# Patient Record
Sex: FEMALE | Race: WHITE | Employment: FULL TIME | URBAN - METROPOLITAN AREA
[De-identification: names, ages, dates, MRNs, and addresses within clinical notes are randomized per-mention and may not be internally consistent; named-entity substitution may affect disease eponyms.]

---

## 2021-08-03 ENCOUNTER — APPOINTMENT (OUTPATIENT)
Dept: GENERAL RADIOLOGY | Age: 66
End: 2021-08-03
Payer: COMMERCIAL

## 2021-08-03 ENCOUNTER — HOSPITAL ENCOUNTER (OUTPATIENT)
Age: 66
Setting detail: OBSERVATION
Discharge: HOME | End: 2021-08-04
Attending: INTERNAL MEDICINE | Admitting: INTERNAL MEDICINE
Payer: COMMERCIAL

## 2021-08-03 DIAGNOSIS — R07.89 ATYPICAL CHEST PAIN: Primary | ICD-10-CM

## 2021-08-03 DIAGNOSIS — R06.00 DYSPNEA, UNSPECIFIED TYPE: ICD-10-CM

## 2021-08-03 DIAGNOSIS — R94.31 ABNORMAL ECG: ICD-10-CM

## 2021-08-03 DIAGNOSIS — R06.02 SHORTNESS OF BREATH: ICD-10-CM

## 2021-08-03 LAB
ALBUMIN SERPL-MCNC: 4.6 G/DL (ref 3.5–4.6)
ALP BLD-CCNC: 88 U/L (ref 40–130)
ALT SERPL-CCNC: 9 U/L (ref 0–33)
ANION GAP SERPL CALCULATED.3IONS-SCNC: 11 MEQ/L (ref 9–15)
APTT: 25.3 SEC (ref 24.4–36.8)
AST SERPL-CCNC: 18 U/L (ref 0–35)
BASOPHILS ABSOLUTE: 0 K/UL (ref 0–0.2)
BASOPHILS RELATIVE PERCENT: 0.5 %
BILIRUB SERPL-MCNC: 0.4 MG/DL (ref 0.2–0.7)
BUN BLDV-MCNC: 12 MG/DL (ref 8–23)
CALCIUM SERPL-MCNC: 9.9 MG/DL (ref 8.5–9.9)
CHLORIDE BLD-SCNC: 98 MEQ/L (ref 95–107)
CO2: 28 MEQ/L (ref 20–31)
CREAT SERPL-MCNC: 0.96 MG/DL (ref 0.5–0.9)
D DIMER: 0.28 MG/L FEU (ref 0–0.5)
EOSINOPHILS ABSOLUTE: 0.1 K/UL (ref 0–0.7)
EOSINOPHILS RELATIVE PERCENT: 1.6 %
GFR AFRICAN AMERICAN: >60
GFR NON-AFRICAN AMERICAN: 58.2
GLOBULIN: 2.8 G/DL (ref 2.3–3.5)
GLUCOSE BLD-MCNC: 96 MG/DL (ref 70–99)
HCT VFR BLD CALC: 44.8 % (ref 37–47)
HEMOGLOBIN: 14.9 G/DL (ref 12–16)
INR BLD: 0.9
LYMPHOCYTES ABSOLUTE: 1.4 K/UL (ref 1–4.8)
LYMPHOCYTES RELATIVE PERCENT: 19.6 %
MCH RBC QN AUTO: 31.9 PG (ref 27–31.3)
MCHC RBC AUTO-ENTMCNC: 33.3 % (ref 33–37)
MCV RBC AUTO: 95.7 FL (ref 82–100)
MONOCYTES ABSOLUTE: 0.6 K/UL (ref 0.2–0.8)
MONOCYTES RELATIVE PERCENT: 8.3 %
NEUTROPHILS ABSOLUTE: 5.1 K/UL (ref 1.4–6.5)
NEUTROPHILS RELATIVE PERCENT: 70 %
PDW BLD-RTO: 14.5 % (ref 11.5–14.5)
PLATELET # BLD: 304 K/UL (ref 130–400)
POTASSIUM SERPL-SCNC: 4.8 MEQ/L (ref 3.4–4.9)
PRO-BNP: 1039 PG/ML
PROTHROMBIN TIME: 12.5 SEC (ref 12.3–14.9)
RBC # BLD: 4.68 M/UL (ref 4.2–5.4)
SARS-COV-2, NAAT: NOT DETECTED
SODIUM BLD-SCNC: 137 MEQ/L (ref 135–144)
TOTAL CK: 58 U/L (ref 0–170)
TOTAL PROTEIN: 7.4 G/DL (ref 6.3–8)
TROPONIN: <0.01 NG/ML (ref 0–0.01)
WBC # BLD: 7.2 K/UL (ref 4.8–10.8)

## 2021-08-03 PROCEDURE — 2580000003 HC RX 258: Performed by: PHYSICIAN ASSISTANT

## 2021-08-03 PROCEDURE — 84484 ASSAY OF TROPONIN QUANT: CPT

## 2021-08-03 PROCEDURE — 85025 COMPLETE CBC W/AUTO DIFF WBC: CPT

## 2021-08-03 PROCEDURE — 93005 ELECTROCARDIOGRAM TRACING: CPT | Performed by: PHYSICIAN ASSISTANT

## 2021-08-03 PROCEDURE — 36415 COLL VENOUS BLD VENIPUNCTURE: CPT

## 2021-08-03 PROCEDURE — 96372 THER/PROPH/DIAG INJ SC/IM: CPT

## 2021-08-03 PROCEDURE — 6360000002 HC RX W HCPCS: Performed by: PHYSICIAN ASSISTANT

## 2021-08-03 PROCEDURE — 82550 ASSAY OF CK (CPK): CPT

## 2021-08-03 PROCEDURE — 85379 FIBRIN DEGRADATION QUANT: CPT

## 2021-08-03 PROCEDURE — 83880 ASSAY OF NATRIURETIC PEPTIDE: CPT

## 2021-08-03 PROCEDURE — G0378 HOSPITAL OBSERVATION PER HR: HCPCS

## 2021-08-03 PROCEDURE — 71045 X-RAY EXAM CHEST 1 VIEW: CPT

## 2021-08-03 PROCEDURE — 6370000000 HC RX 637 (ALT 250 FOR IP): Performed by: PHYSICIAN ASSISTANT

## 2021-08-03 PROCEDURE — 80053 COMPREHEN METABOLIC PANEL: CPT

## 2021-08-03 PROCEDURE — 99284 EMERGENCY DEPT VISIT MOD MDM: CPT

## 2021-08-03 PROCEDURE — 85730 THROMBOPLASTIN TIME PARTIAL: CPT

## 2021-08-03 PROCEDURE — 87635 SARS-COV-2 COVID-19 AMP PRB: CPT

## 2021-08-03 PROCEDURE — 85610 PROTHROMBIN TIME: CPT

## 2021-08-03 PROCEDURE — 96374 THER/PROPH/DIAG INJ IV PUSH: CPT

## 2021-08-03 RX ORDER — ONDANSETRON 2 MG/ML
4 INJECTION INTRAMUSCULAR; INTRAVENOUS EVERY 6 HOURS PRN
Status: DISCONTINUED | OUTPATIENT
Start: 2021-08-03 | End: 2021-08-04 | Stop reason: HOSPADM

## 2021-08-03 RX ORDER — ACETAMINOPHEN 325 MG/1
650 TABLET ORAL EVERY 6 HOURS PRN
Status: DISCONTINUED | OUTPATIENT
Start: 2021-08-03 | End: 2021-08-04 | Stop reason: HOSPADM

## 2021-08-03 RX ORDER — OMEPRAZOLE 20 MG/1
20 CAPSULE, DELAYED RELEASE ORAL DAILY
COMMUNITY

## 2021-08-03 RX ORDER — AMLODIPINE BESYLATE 5 MG/1
5 TABLET ORAL DAILY
COMMUNITY
End: 2021-09-22 | Stop reason: SDUPTHER

## 2021-08-03 RX ORDER — NITROGLYCERIN 0.4 MG/1
0.4 TABLET SUBLINGUAL EVERY 5 MIN PRN
Status: DISCONTINUED | OUTPATIENT
Start: 2021-08-03 | End: 2021-08-04 | Stop reason: HOSPADM

## 2021-08-03 RX ORDER — POLYETHYLENE GLYCOL 3350 17 G/17G
17 POWDER, FOR SOLUTION ORAL DAILY PRN
Status: DISCONTINUED | OUTPATIENT
Start: 2021-08-03 | End: 2021-08-04 | Stop reason: HOSPADM

## 2021-08-03 RX ORDER — SODIUM CHLORIDE 0.9 % (FLUSH) 0.9 %
5-40 SYRINGE (ML) INJECTION EVERY 12 HOURS SCHEDULED
Status: DISCONTINUED | OUTPATIENT
Start: 2021-08-03 | End: 2021-08-04 | Stop reason: HOSPADM

## 2021-08-03 RX ORDER — SODIUM CHLORIDE 9 MG/ML
25 INJECTION, SOLUTION INTRAVENOUS PRN
Status: DISCONTINUED | OUTPATIENT
Start: 2021-08-03 | End: 2021-08-04 | Stop reason: HOSPADM

## 2021-08-03 RX ORDER — ACETAMINOPHEN 650 MG/1
650 SUPPOSITORY RECTAL EVERY 6 HOURS PRN
Status: DISCONTINUED | OUTPATIENT
Start: 2021-08-03 | End: 2021-08-04 | Stop reason: HOSPADM

## 2021-08-03 RX ORDER — METHYLPREDNISOLONE SODIUM SUCCINATE 125 MG/2ML
125 INJECTION, POWDER, LYOPHILIZED, FOR SOLUTION INTRAMUSCULAR; INTRAVENOUS ONCE
Status: COMPLETED | OUTPATIENT
Start: 2021-08-03 | End: 2021-08-03

## 2021-08-03 RX ORDER — ONDANSETRON 4 MG/1
4 TABLET, ORALLY DISINTEGRATING ORAL EVERY 8 HOURS PRN
Status: DISCONTINUED | OUTPATIENT
Start: 2021-08-03 | End: 2021-08-04 | Stop reason: HOSPADM

## 2021-08-03 RX ORDER — ATORVASTATIN CALCIUM 80 MG/1
80 TABLET, FILM COATED ORAL NIGHTLY
Status: DISCONTINUED | OUTPATIENT
Start: 2021-08-03 | End: 2021-08-04 | Stop reason: HOSPADM

## 2021-08-03 RX ORDER — ATENOLOL 50 MG/1
TABLET ORAL DAILY
COMMUNITY
End: 2021-09-22 | Stop reason: SDUPTHER

## 2021-08-03 RX ORDER — SODIUM CHLORIDE 0.9 % (FLUSH) 0.9 %
5-40 SYRINGE (ML) INJECTION PRN
Status: DISCONTINUED | OUTPATIENT
Start: 2021-08-03 | End: 2021-08-04 | Stop reason: HOSPADM

## 2021-08-03 RX ORDER — ASPIRIN 81 MG/1
81 TABLET, CHEWABLE ORAL DAILY
Status: DISCONTINUED | OUTPATIENT
Start: 2021-08-04 | End: 2021-08-04 | Stop reason: HOSPADM

## 2021-08-03 RX ADMIN — ENOXAPARIN SODIUM 40 MG: 40 INJECTION SUBCUTANEOUS at 21:23

## 2021-08-03 RX ADMIN — ATORVASTATIN CALCIUM 80 MG: 80 TABLET, FILM COATED ORAL at 21:22

## 2021-08-03 RX ADMIN — SODIUM CHLORIDE, PRESERVATIVE FREE 10 ML: 5 INJECTION INTRAVENOUS at 21:23

## 2021-08-03 RX ADMIN — METHYLPREDNISOLONE SODIUM SUCCINATE 125 MG: 125 INJECTION, POWDER, FOR SOLUTION INTRAMUSCULAR; INTRAVENOUS at 14:56

## 2021-08-03 ASSESSMENT — ENCOUNTER SYMPTOMS
COLOR CHANGE: 0
SHORTNESS OF BREATH: 1
EYE DISCHARGE: 0
NAUSEA: 0
ABDOMINAL PAIN: 0
ABDOMINAL DISTENTION: 0
CONSTIPATION: 0
COUGH: 1
SORE THROAT: 0
RHINORRHEA: 0
DIARRHEA: 0
WHEEZING: 0

## 2021-08-03 ASSESSMENT — PAIN SCALES - GENERAL
PAINLEVEL_OUTOF10: 0

## 2021-08-03 NOTE — ED TRIAGE NOTES
Pt c/o sob and productive cough with yellow production since Saturday, pt a&ox4,s kin w//dpink, pulses palp, 0 distress at this time, steady gait noted, speech clear.

## 2021-08-03 NOTE — ED PROVIDER NOTES
[address] [facility] ED  eMERGENCY dEPARTMENT eNCOUnter      Pt Name: Arianna Villa  MRN: [id number]  [place] 1955  Date of evaluation: 8/3/2021  Provider: Kiel Tan PA-C    CHIEF COMPLAINT       Chief Complaint   Patient presents with    Shortness of Breath     pt c/o sob an dproductive cough since saturday         HISTORY OF PRESENT ILLNESS   (Location/Symptom, Timing/Onset,Context/Setting, Quality, Duration, Modifying Factors, Severity)  Note limiting factors. Arianna Villa is a 72 y.o. female who presents to the emergency department with a complaint of cough, which is productive for yellow sputum, shortness of breath, which started this past Saturday, 7/31/2021. Patient denies any chest pain, she states she does have past history of chronic bronchitis, hypertension. She states she does get frequent infections. Patient this time has no pain, 0 out of 10. She states shortness of breath all the time she does not wear home O2. HPI    NursingNotes were reviewed. REVIEW OF SYSTEMS    (2-9 systems for level 4, 10 or more for level 5)     Review of Systems   Constitutional: Negative for activity change and appetite change. HENT: Negative for congestion, ear discharge, ear pain, nosebleeds, rhinorrhea and sore throat. Eyes: Negative for discharge. Respiratory: Positive for cough and shortness of breath. Negative for wheezing. Cardiovascular: Negative for chest pain, palpitations and leg swelling. Gastrointestinal: Negative for abdominal distention, abdominal pain, constipation, diarrhea and nausea. Genitourinary: Negative for difficulty urinating and dysuria. Musculoskeletal: Negative for arthralgias. Skin: Negative for color change. Neurological: Negative for dizziness, syncope, weakness, numbness and headaches. Psychiatric/Behavioral: Negative for agitation and confusion. Except as noted above the remainder of the review of systems was reviewed and negative. PAST MEDICAL HISTORY     Past Medical History:   Diagnosis Date    Bronchitis     Hypertension          SURGICALHISTORY       Past Surgical History:   Procedure Laterality Date    LUNG SURGERY      lump found 1980's    TUBAL LIGATION           CURRENT MEDICATIONS       Previous Medications    AMLODIPINE (NORVASC) 2.5 MG TABLET    Take 2.5 mg by mouth daily    ATENOLOL PO    Take by mouth       ALLERGIES     Codeine    FAMILY HISTORY     History reviewed. No pertinent family history. SOCIAL HISTORY       Social History     Socioeconomic History    Marital status:      Spouse name: None    Number of children: None    Years of education: None    Highest education level: None   Occupational History    None   Tobacco Use    Smoking status: Former Smoker    Smokeless tobacco: Never Used   Substance and Sexual Activity    Alcohol use: Yes     Comment: occas.  Drug use: Never    Sexual activity: None   Other Topics Concern    None   Social History Narrative    None     Social Determinants of Health     Financial Resource Strain:     Difficulty of Paying Living Expenses:    Food Insecurity:     Worried About Running Out of Food in the Last Year:     [address] in the Last Year:    Transportation Needs:     Lack of Transportation (Medical):      Lack of Transportation (Non-Medical):    Physical Activity:     Days of Exercise per Week:     Minutes of Exercise per Session:    Stress:     Feeling of Stress :    Social Connections:     Frequency of Communication with Friends and Family:     Frequency of Social Gatherings with Friends and Family:     Attends [employer] Services:     Active Member of Clubs or Organizations:     Attends Club or Organization Meetings:     Marital Status:    Intimate Partner Violence:     Fear of Current or Ex-Partner:     Emotionally Abused:     Physically Abused:     Sexually Abused:        SCREENINGS      @FLOW([id number])@      PHYSICAL EXAM (up to 7 for level 4, 8 or more for level 5)     ED Triage Vitals [08/03/21 1416]   BP Temp Temp Source Pulse Resp SpO2 Height Weight   (!) 150/78 97.5 °F (36.4 °C) Oral 69 18 95 % 4' 11\" (1.499 m) 95 lb 6.4 oz (43.3 kg)       Physical Exam  Vitals and nursing note reviewed. Constitutional:       General: She is not in acute distress. Appearance: She is well-developed. She is not ill-appearing, toxic-appearing or diaphoretic. HENT:      Head: Normocephalic. Nose: No congestion. Mouth/Throat:      Mouth: Mucous membranes are moist.      Pharynx: No oropharyngeal exudate or posterior oropharyngeal erythema. Eyes:      Extraocular Movements: Extraocular movements intact. Conjunctiva/sclera: Conjunctivae normal.      Pupils: Pupils are equal, round, and reactive to light. Neck:      Vascular: No JVD. Trachea: No tracheal deviation. Cardiovascular:      Rate and Rhythm: Normal rate. Pulses: Normal pulses. Heart sounds: Normal heart sounds. No murmur heard. No friction rub. No gallop. Pulmonary:      Effort: Pulmonary effort is normal. No tachypnea, accessory muscle usage, respiratory distress or retractions. Breath sounds: Normal breath sounds. No stridor. No wheezing, rhonchi or rales. Comments: Lung sounds are clear in all fields, there is no wheezes rales or rhonchi, no accessory muscle use, retractions. Saturations are 95% on room air. Chest:      Chest wall: No tenderness. Abdominal:      General: Abdomen is flat. Bowel sounds are normal. There is no distension or abdominal bruit. Palpations: There is no shifting dullness, fluid wave, hepatomegaly, splenomegaly, mass or pulsatile mass. Tenderness: There is no abdominal tenderness. There is no right CVA tenderness, left CVA tenderness, guarding or rebound. Negative signs include Hardy's sign, Rovsing's sign and McBurney's sign. Musculoskeletal:         General: No deformity.       Cervical back: Normal range of motion and neck supple. No rigidity. Right lower leg: No edema. Left lower leg: No edema. Skin:     General: Skin is warm and dry. Capillary Refill: Capillary refill takes less than 2 seconds. Coloration: Skin is not jaundiced. Neurological:      General: No focal deficit present. Mental Status: She is alert and oriented to person, place, and time. Mental status is at baseline. Cranial Nerves: No cranial nerve deficit. Sensory: No sensory deficit. Motor: No weakness. Coordination: Coordination normal.   Psychiatric:         Mood and Affect: Mood normal.         DIAGNOSTIC RESULTS     EKG: All EKG's are interpreted by the Emergency Department Physician who either signs or Co-signsthis chart in the absence of a cardiologist.    EKG shows normal sinus rhythm at 65 bpm there is T wave inversions in leads V1, V2, V3, V4 V5 and V6 no ventricular ectopy QTC is 438 ms    RADIOLOGY:   Non-plain filmimages such as CT, Ultrasound and MRI are read by the radiologist. Plain radiographic images are visualized and preliminarily interpreted by the emergency physician with the below findings:        Interpretation per the Radiologist below, if available at the time ofthis note:    XR CHEST PORTABLE   Final Result   No radiographic evidence of acute intrathoracic process.                   ED BEDSIDE ULTRASOUND:   Performed by ED Physician - none    LABS:  Labs Reviewed   CBC WITH AUTO DIFFERENTIAL - Abnormal; Notable for the following components:       Result Value    MCH 31.9 (*)     All other components within normal limits   COMPREHENSIVE METABOLIC PANEL - Abnormal; Notable for the following components:    CREATININE 0.96 (*)     GFR Non- 58.2 (*)     All other components within normal limits   COVID-19, RAPID   TROPONIN   CK   PROTIME-INR   APTT   D-DIMER, QUANTITATIVE       All other labs were within normal range or not returned as of this

## 2021-08-03 NOTE — CONSULTS
Advance Care Planning     Advance Care Planning Inpatient Note  [facility] Department    Today's Date: 8/3/2021  Unit: [facility]    Received request from a healthcare provider. Upon review of chart and communication with care team, patient's decision making abilities are not in question. Patientwas/were present in the room during visit. Goals of ACP Conversation:  Discuss advance care planning documents  Facilitate a discussion related to patient's goals of care as they align with the patient's values and beliefs. Health Care Decision Makers:      Primary Decision Maker: Jessica Estephaniehenry - Brother/Sister - [phone]    Secondary Decision Maker: Roberto Smith Child - [phone]  Click here to complete [address] including selection of the Healthcare Decision Maker Relationship (ie \"Primary\")     Today we:  Updated [address] (Patient Wishes):  Healthcare Power of /Advance Directive Appointment of Health Care Agent  Living Will/Advance Directive     Assessment:  Supportive conversation with pt and her sister. Eleven siblings in their family, now [de-identified] yet living, they are the only two girls. The pt cared for one brother until his death from cancer. She was able to articulate that she does not wish to die as he did, and thus decided to complete a Living Will, with a plan to receive less aggressive treatment near end of life. Her son's father  one year ago as well she said, keenly aware of loss. Interventions:  Assisted in the completion of documents according to patient's wishes at this time. Returned original document(s) to patient, as well as copies for distribution to appointed agents. Outcomes/Plan:  New advance directive completed.     Electronically signed by Laura Narayan, [address] [employer] on 8/3/2021 at 6:51 PM

## 2021-08-03 NOTE — ACP (ADVANCE CARE PLANNING)
Advance Care Planning     Advance Care Planning Activator (Inpatient)  Conversation Note      Date of ACP Conversation: 8/3/2021     Conversation Conducted with: Patient with Decision Making Capacity    ACP Activator: [address] Decision Maker:     Current Designated Health Care Decision Maker:     Dashawn Mcduffie: sister: [phone]      Care Preferences    Ventilation: \"If you were in your present state of health and suddenly became very ill and were unable to breathe on your own, what would your preference be about the use of a ventilator (breathing machine) if it were available to you? \"      Would the patient desire the use of ventilator (breathing machine)?: yes    \"If your health worsens and it becomes clear that your chance of recovery is unlikely, what would your preference be about the use of a ventilator (breathing machine) if it were available to you? \"     Would the patient desire the use of ventilator (breathing machine)?: Yes      Resuscitation  \"CPR works best to restart the heart when there is a sudden event, like a heart attack, in someone who is otherwise healthy. Unfortunately, CPR does not typically restart the heart for people who have serious health conditions or who are very sick. \"    \"In the event your heart stopped as a result of an underlying serious health condition, would you want attempts to be made to restart your heart (answer \"yes\" for attempt to resuscitate) or would you prefer a natural death (answer \"no\" for do not attempt to resuscitate)? \" yes       [x] Yes   [] No   Educated Patient / Nury Brandt regarding differences between Advance Directives and portable DNR orders.     Length of ACP Conversation in minutes:  10  Conversation Outcomes:  [x] ACP discussion completed  [] Existing advance directive reviewed with patient; no changes to patient's previously recorded wishes  [] New Advance Directive completed  [] Portable Do Not Rescitate prepared for Provider review and signature  [] POLST/POST/MOLST/MOST prepared for Provider review and signature      Follow-up plan:    [] Schedule follow-up conversation to continue planning  [] Referred individual to Provider for additional questions/concerns   [] Advised patient/agent/surrogate to review completed ACP document and update if needed with changes in condition, patient preferences or care setting    [x] This note routed to one or more involved healthcare providers

## 2021-08-03 NOTE — ED NOTES
Patient sitting in ED cot, no distress noted, resp even and unlabored, no concerns voiced. Will continue to monitor.      Rhonda Zamora RN  08/03/21 4457

## 2021-08-03 NOTE — CARE COORDINATION
[facility] Case Management Initial Discharge Assessment    Met with Patient to discuss discharge plan. PCP: Trevor Soulier, DO                                Date of Last Visit: weeks    If no PCP, list provided? N/A    Discharge Planning    Living Arrangements: independently at home    Who do you live with? self    Who helps you with your care:  self    If lives at home:     Do you have any barriers navigating in your home? no    Patient can perform ADL? Yes    Current Services (outpatient and in home) :  None    Dialysis: No    Is transportation available to get to your appointments? Yes    DME Equipment:  no    Respiratory equipment: None    Respiratory provider:  no     [facility]    Consult with Medication Assistance Program?  No      Patient agreeable to [facility]? Declined    Patient agreeable to SNF/Rehab? Declined    Other discharge needs identified? N/A    Does Patient Have a High-Risk for Readmission Diagnosis (CHF, PN, MI, COPD)? No       Initial Discharge Plan? (Note: please see concurrent daily documentation for any updates after initial note). Cm to assess for further d/c needs and referrals.     Readmission Risk              Risk of Unplanned Readmission:  0         Electronically signed by Della Rivers on 8/3/2021 at 4:59 PM

## 2021-08-04 VITALS
TEMPERATURE: 97.4 F | SYSTOLIC BLOOD PRESSURE: 129 MMHG | RESPIRATION RATE: 20 BRPM | BODY MASS INDEX: 19.25 KG/M2 | HEART RATE: 71 BPM | OXYGEN SATURATION: 94 % | HEIGHT: 59 IN | WEIGHT: 95.5 LBS | DIASTOLIC BLOOD PRESSURE: 81 MMHG

## 2021-08-04 LAB
CHOLESTEROL, TOTAL: 302 MG/DL (ref 0–199)
EKG ATRIAL RATE: 65 BPM
EKG ATRIAL RATE: 72 BPM
EKG P AXIS: 77 DEGREES
EKG P AXIS: 79 DEGREES
EKG P-R INTERVAL: 154 MS
EKG P-R INTERVAL: 164 MS
EKG Q-T INTERVAL: 398 MS
EKG Q-T INTERVAL: 422 MS
EKG QRS DURATION: 64 MS
EKG QRS DURATION: 68 MS
EKG QTC CALCULATION (BAZETT): 435 MS
EKG QTC CALCULATION (BAZETT): 438 MS
EKG R AXIS: 69 DEGREES
EKG R AXIS: 71 DEGREES
EKG T AXIS: 86 DEGREES
EKG T AXIS: 86 DEGREES
EKG VENTRICULAR RATE: 65 BPM
EKG VENTRICULAR RATE: 72 BPM
HCT VFR BLD CALC: 41.3 % (ref 37–47)
HDLC SERPL-MCNC: 126 MG/DL (ref 40–59)
HEMOGLOBIN: 13.9 G/DL (ref 12–16)
LDL CHOLESTEROL CALCULATED: 143 MG/DL (ref 0–129)
LV EF: 60 %
LVEF MODALITY: NORMAL
MAGNESIUM: 2 MG/DL (ref 1.7–2.4)
MCH RBC QN AUTO: 31.7 PG (ref 27–31.3)
MCHC RBC AUTO-ENTMCNC: 33.6 % (ref 33–37)
MCV RBC AUTO: 94.5 FL (ref 82–100)
PDW BLD-RTO: 14.1 % (ref 11.5–14.5)
PLATELET # BLD: 295 K/UL (ref 130–400)
RBC # BLD: 4.37 M/UL (ref 4.2–5.4)
TRIGL SERPL-MCNC: 164 MG/DL (ref 0–150)
WBC # BLD: 6.3 K/UL (ref 4.8–10.8)

## 2021-08-04 PROCEDURE — 93010 ELECTROCARDIOGRAM REPORT: CPT | Performed by: INTERNAL MEDICINE

## 2021-08-04 PROCEDURE — 93306 TTE W/DOPPLER COMPLETE: CPT

## 2021-08-04 PROCEDURE — 99220 PR INITIAL OBSERVATION CARE/DAY 70 MINUTES: CPT | Performed by: INTERNAL MEDICINE

## 2021-08-04 PROCEDURE — 85027 COMPLETE CBC AUTOMATED: CPT

## 2021-08-04 PROCEDURE — 80061 LIPID PANEL: CPT

## 2021-08-04 PROCEDURE — 2580000003 HC RX 258: Performed by: PHYSICIAN ASSISTANT

## 2021-08-04 PROCEDURE — 6370000000 HC RX 637 (ALT 250 FOR IP): Performed by: INTERNAL MEDICINE

## 2021-08-04 PROCEDURE — G0378 HOSPITAL OBSERVATION PER HR: HCPCS

## 2021-08-04 PROCEDURE — 83735 ASSAY OF MAGNESIUM: CPT

## 2021-08-04 PROCEDURE — 36415 COLL VENOUS BLD VENIPUNCTURE: CPT

## 2021-08-04 PROCEDURE — 93005 ELECTROCARDIOGRAM TRACING: CPT | Performed by: PHYSICIAN ASSISTANT

## 2021-08-04 PROCEDURE — 6370000000 HC RX 637 (ALT 250 FOR IP): Performed by: PHYSICIAN ASSISTANT

## 2021-08-04 PROCEDURE — 6360000002 HC RX W HCPCS: Performed by: INTERNAL MEDICINE

## 2021-08-04 PROCEDURE — 96375 TX/PRO/DX INJ NEW DRUG ADDON: CPT

## 2021-08-04 RX ORDER — AMLODIPINE BESYLATE 5 MG/1
5 TABLET ORAL DAILY
Status: DISCONTINUED | OUTPATIENT
Start: 2021-08-04 | End: 2021-08-04 | Stop reason: HOSPADM

## 2021-08-04 RX ORDER — ASPIRIN 81 MG/1
81 TABLET, CHEWABLE ORAL DAILY
Qty: 30 TABLET | Refills: 3 | Status: SHIPPED | OUTPATIENT
Start: 2021-08-05 | End: 2022-10-12

## 2021-08-04 RX ORDER — PANTOPRAZOLE SODIUM 40 MG/1
40 TABLET, DELAYED RELEASE ORAL
Status: DISCONTINUED | OUTPATIENT
Start: 2021-08-04 | End: 2021-08-04 | Stop reason: HOSPADM

## 2021-08-04 RX ORDER — FUROSEMIDE 10 MG/ML
20 INJECTION INTRAMUSCULAR; INTRAVENOUS ONCE
Status: COMPLETED | OUTPATIENT
Start: 2021-08-04 | End: 2021-08-04

## 2021-08-04 RX ORDER — FUROSEMIDE 20 MG/1
20 TABLET ORAL DAILY
Qty: 60 TABLET | Refills: 3 | Status: SHIPPED | OUTPATIENT
Start: 2021-08-04

## 2021-08-04 RX ORDER — ATENOLOL 50 MG/1
50 TABLET ORAL DAILY
Status: DISCONTINUED | OUTPATIENT
Start: 2021-08-04 | End: 2021-08-04 | Stop reason: HOSPADM

## 2021-08-04 RX ADMIN — PANTOPRAZOLE SODIUM 40 MG: 40 TABLET, DELAYED RELEASE ORAL at 11:36

## 2021-08-04 RX ADMIN — ASPIRIN 81 MG: 81 TABLET, CHEWABLE ORAL at 08:48

## 2021-08-04 RX ADMIN — FUROSEMIDE 20 MG: 10 INJECTION INTRAMUSCULAR; INTRAVENOUS at 11:35

## 2021-08-04 RX ADMIN — SODIUM CHLORIDE, PRESERVATIVE FREE 10 ML: 5 INJECTION INTRAVENOUS at 08:48

## 2021-08-04 RX ADMIN — AMLODIPINE BESYLATE 5 MG: 5 TABLET ORAL at 08:48

## 2021-08-04 RX ADMIN — ATENOLOL 50 MG: 50 TABLET ORAL at 08:48

## 2021-08-04 ASSESSMENT — ENCOUNTER SYMPTOMS
SHORTNESS OF BREATH: 1
EYES NEGATIVE: 1
VOMITING: 0
ALLERGIC/IMMUNOLOGIC NEGATIVE: 1
WHEEZING: 0
COUGH: 1
ABDOMINAL PAIN: 0
GASTROINTESTINAL NEGATIVE: 1
NAUSEA: 0

## 2021-08-04 ASSESSMENT — PAIN SCALES - GENERAL
PAINLEVEL_OUTOF10: 0

## 2021-08-04 NOTE — H&P
Chief Complaint   Patient presents with    Shortness of Breath     pt c/o sob an dproductive cough since saturday        Patient is a 72 y.o. female who presents with a chief complaint of SOB. Patient is followed on a regular basis by Dr. Jazmín Hidalgo DO. Patient presents with a chief complaint of productive cough with yellow sputum as well as worsening shortness of breath for the past few days. She denies any chest pain chest pressure heaviness. Patient admits to history of hypertension but denies diabetes, hyperlipidemia or tobacco use. She quit smoking 16 years ago. Denies any history of myocardial infarction, congestive heart failure or arrhythmia. No previous history of stress test or cardiac catheterization. EKG was noted to be abnormal and admitted for further evaluation. Patient states she had an abnormal EKG previously. Initial cardiac enzymes are negative and elevated BNP 1039. Otherwise laboratory evaluations unremarkable. Past Medical History:   Diagnosis Date    Bronchitis     Hypertension       Patient Active Problem List   Diagnosis    Atypical chest pain       Past Surgical History:   Procedure Laterality Date    LUNG SURGERY      lump found 1980's    TUBAL LIGATION         Social History     Socioeconomic History    Marital status:      Spouse name: None    Number of children: None    Years of education: None    Highest education level: None   Occupational History    None   Tobacco Use    Smoking status: Former Smoker    Smokeless tobacco: Never Used   Substance and Sexual Activity    Alcohol use: Yes     Comment: occas.     Drug use: Never    Sexual activity: None   Other Topics Concern    None   Social History Narrative    None     Social Determinants of Health     Financial Resource Strain:     Difficulty of Paying Living Expenses:    Food Insecurity:     Worried About Running Out of Food in the Last Year:     [address] in the Last Year: Transportation Needs:     Lack of Transportation (Medical):  Lack of Transportation (Non-Medical):    Physical Activity:     Days of Exercise per Week:     Minutes of Exercise per Session:    Stress:     Feeling of Stress :    Social Connections:     Frequency of Communication with Friends and Family:     Frequency of Social Gatherings with Friends and Family:     Attends Mosque Services:     Active Member of Clubs or Organizations:     Attends Club or Organization Meetings:     Marital Status:    Intimate Partner Violence:     Fear of Current or Ex-Partner:     Emotionally Abused:     Physically Abused:     Sexually Abused:        History reviewed. No pertinent family history.     Current Facility-Administered Medications   Medication Dose Route Frequency Provider Last Rate Last Admin    atenolol (TENORMIN) tablet 50 mg  50 mg Oral Daily Kyle J Holiday, DO   50 mg at 08/04/21 0848    amLODIPine (NORVASC) tablet 5 mg  5 mg Oral Daily Kyle J Holiday, DO   5 mg at 08/04/21 0848    pantoprazole (PROTONIX) tablet 40 mg  40 mg Oral QAM AC Kyle J Holiday, DO        sodium chloride flush 0.9 % injection 5-40 mL  5-40 mL Intravenous 2 times per day Connye Kawasaki, PA-C   10 mL at 08/04/21 0848    sodium chloride flush 0.9 % injection 5-40 mL  5-40 mL Intravenous PRN Connye Kawasaki, PA-C        0.9 % sodium chloride infusion  25 mL Intravenous PRN Connye Kawasaki, PA-C        ondansetron (ZOFRAN-ODT) disintegrating tablet 4 mg  4 mg Oral Q8H PRN Connye Kawasaki, PA-C        Or    ondansetron [facility]) injection 4 mg  4 mg Intravenous Q6H PRN Connye Kawasaki, PA-C        acetaminophen (TYLENOL) tablet 650 mg  650 mg Oral Q6H PRN Connye Kawasaki, PA-C        Or    acetaminophen (TYLENOL) suppository 650 mg  650 mg Rectal Q6H PRN Connye Kawasaki, PA-C        polyethylene glycol (GLYCOLAX) packet 17 g  17 g Oral Daily PRN Connye Kawasaki, PA-C        aspirin chewable tablet 81 mg  81 mg Oral Daily Norina Pod, PA-C   81 mg at 08/04/21 0848    enoxaparin (LOVENOX) injection 40 mg  40 mg Subcutaneous Daily Carito Verma, PA-C   40 mg at 08/03/21 2123    atorvastatin (LIPITOR) tablet 80 mg  80 mg Oral Nightly Norina Pod, PA-C   80 mg at 08/03/21 2122    nitroGLYCERIN (NITROSTAT) SL tablet 0.4 mg  0.4 mg Sublingual Q5 Min PRN Norina Pod, PA-C           ALLERGIES: Codeine    Review of Systems   Constitutional: Negative. Negative for chills and fever. HENT: Negative. Eyes: Negative. Respiratory: Positive for cough and shortness of breath. Negative for wheezing. Cardiovascular: Negative for chest pain, palpitations and leg swelling. Gastrointestinal: Negative. Negative for abdominal pain, nausea and vomiting. Endocrine: Negative. Genitourinary: Negative. Musculoskeletal: Negative. Skin: Negative. Negative for rash. Allergic/Immunologic: Negative. Neurological: Negative for dizziness, weakness and headaches. Hematological: Negative. Psychiatric/Behavioral: Negative. VITALS:  Blood pressure 135/77, pulse 73, temperature 98.5 °F (36.9 °C), temperature source Oral, resp. rate 18, height 4' 11\" (1.499 m), weight 95 lb 8 oz (43.3 kg), SpO2 94 %. Body mass index is 19.29 kg/m². Physical Exam  Constitutional:       Appearance: She is well-developed. She is not diaphoretic. HENT:      Head: Normocephalic and atraumatic. Eyes:      Pupils: Pupils are equal, round, and reactive to light. Neck:      Thyroid: No thyromegaly. Vascular: No JVD. Trachea: No tracheal deviation. Cardiovascular:      Rate and Rhythm: Normal rate and regular rhythm. Chest Wall: PMI is not displaced. Pulses: Intact distal pulses. Heart sounds: Normal heart sounds. Heart sounds not distant. No murmur heard. No friction rub. No gallop. No S3 sounds. Pulmonary:      Effort: No respiratory distress. Breath sounds:  No wheezing or rales.   Chest:      Chest wall: No tenderness. Abdominal:      General: Bowel sounds are normal. There is no distension. Palpations: Abdomen is soft. There is no mass. Tenderness: There is no abdominal tenderness. There is no guarding or rebound. Musculoskeletal:      Cervical back: Normal range of motion and neck supple. Skin:     General: Skin is warm and dry. Coloration: Skin is not pale. Findings: No erythema or rash. Neurological:      Mental Status: She is alert and oriented to person, place, and time. Cranial Nerves: No cranial nerve deficit. Psychiatric:         Behavior: Behavior normal.         Thought Content:  Thought content normal.         Judgment: Judgment normal.         LABS:  Recent Results (from the past 24 hour(s))   EKG 12 Lead - Chest Pain    Collection Time: 08/03/21  2:40 PM   Result Value Ref Range    Ventricular Rate 65 BPM    Atrial Rate 65 BPM    P-R Interval 164 ms    QRS Duration 68 ms    Q-T Interval 422 ms    QTc Calculation (Bazett) 438 ms    P Axis 77 degrees    R Axis 69 degrees    T Axis 86 degrees   CBC Auto Differential    Collection Time: 08/03/21  2:45 PM   Result Value Ref Range    WBC 7.2 4.8 - 10.8 K/uL    RBC 4.68 4.20 - 5.40 M/uL    Hemoglobin 14.9 12.0 - 16.0 g/dL    Hematocrit 44.8 37.0 - 47.0 %    MCV 95.7 82.0 - 100.0 fL    MCH 31.9 (H) 27.0 - 31.3 pg    MCHC 33.3 33.0 - 37.0 %    RDW 14.5 11.5 - 14.5 %    Platelets 879 202 - 504 K/uL    Neutrophils % 70.0 %    Lymphocytes % 19.6 %    Monocytes % 8.3 %    Eosinophils % 1.6 %    Basophils % 0.5 %    Neutrophils Absolute 5.1 1.4 - 6.5 K/uL    Lymphocytes Absolute 1.4 1.0 - 4.8 K/uL    Monocytes Absolute 0.6 0.2 - 0.8 K/uL    Eosinophils Absolute 0.1 0.0 - 0.7 K/uL    Basophils Absolute 0.0 0.0 - 0.2 K/uL   Troponin    Collection Time: 08/03/21  3:00 PM   Result Value Ref Range    Troponin <0.010 0.000 - 0.010 ng/mL   CK    Collection Time: 08/03/21  3:00 PM   Result Value Ref Range Total CK 58 0 - 170 U/L   Protime-INR    Collection Time: 08/03/21  3:00 PM   Result Value Ref Range    Protime 12.5 12.3 - 14.9 sec    INR 0.9    APTT    Collection Time: 08/03/21  3:00 PM   Result Value Ref Range    aPTT 25.3 24.4 - 36.8 sec   Comprehensive Metabolic Panel    Collection Time: 08/03/21  3:00 PM   Result Value Ref Range    Sodium 137 135 - 144 mEq/L    Potassium 4.8 3.4 - 4.9 mEq/L    Chloride 98 95 - 107 mEq/L    CO2 28 20 - 31 mEq/L    Anion Gap 11 9 - 15 mEq/L    Glucose 96 70 - 99 mg/dL    BUN 12 8 - 23 mg/dL    CREATININE 0.96 (H) 0.50 - 0.90 mg/dL    GFR Non-African American 58.2 (L) >60    GFR  >60.0 >60    Calcium 9.9 8.5 - 9.9 mg/dL    Total Protein 7.4 6.3 - 8.0 g/dL    Albumin 4.6 3.5 - 4.6 g/dL    Total Bilirubin 0.4 0.2 - 0.7 mg/dL    Alkaline Phosphatase 88 40 - 130 U/L    ALT 9 0 - 33 U/L    AST 18 0 - 35 U/L    Globulin 2.8 2.3 - 3.5 g/dL   D-Dimer, Quantitative    Collection Time: 08/03/21  3:00 PM   Result Value Ref Range    D-Dimer, Quant 0.28 0.00 - 0.50 mg/L FEU   COVID-19, Rapid    Collection Time: 08/03/21  4:21 PM    Specimen: Nasopharyngeal Swab   Result Value Ref Range    SARS-CoV-2, NAAT Not Detected Not Detected   Troponin    Collection Time: 08/03/21  6:24 PM   Result Value Ref Range    Troponin <0.010 0.000 - 0.010 ng/mL   Brain natriuretic peptide    Collection Time: 08/03/21  6:24 PM   Result Value Ref Range    Pro-BNP 1,039 pg/mL   Troponin    Collection Time: 08/03/21 10:59 PM   Result Value Ref Range    Troponin <0.010 0.000 - 0.010 ng/mL   EKG 12 lead    Collection Time: 08/04/21 12:41 AM   Result Value Ref Range    Ventricular Rate 72 BPM    Atrial Rate 72 BPM    P-R Interval 154 ms    QRS Duration 64 ms    Q-T Interval 398 ms    QTc Calculation (Bazett) 435 ms    P Axis 79 degrees    R Axis 71 degrees    T Axis 86 degrees   Magnesium    Collection Time: 08/04/21  6:35 AM   Result Value Ref Range    Magnesium 2.0 1.7 - 2.4 mg/dL   Lipid panel - fasting    Collection Time: 08/04/21  6:35 AM   Result Value Ref Range    Cholesterol, Total 302 (H) 0 - 199 mg/dL    Triglycerides 164 (H) 0 - 150 mg/dL     (H) 40 - 59 mg/dL    LDL Calculated 143 (H) 0 - 129 mg/dL   CBC    Collection Time: 08/04/21  6:35 AM   Result Value Ref Range    WBC 6.3 4.8 - 10.8 K/uL    RBC 4.37 4.20 - 5.40 M/uL    Hemoglobin 13.9 12.0 - 16.0 g/dL    Hematocrit 41.3 37.0 - 47.0 %    MCV 94.5 82.0 - 100.0 fL    MCH 31.7 (H) 27.0 - 31.3 pg    MCHC 33.6 33.0 - 37.0 %    RDW 14.1 11.5 - 14.5 %    Platelets 909 473 - 955 K/uL     Troponin:   Lab Results   Component Value Date    TROPONINI <0.010 08/03/2021       EKG: normal sinus rhythm, ST depressions in the lateral leads, T wave inversion in the anterior leads. ASSESSMENT:    Shortness of breath questionable secondary to CHF versus other. Rule out cardiomyopathy  Essential hypertension  Abnormal EKG  History of remote tobacco abuse    PLAN:   1. As always, aggressive risk factor modification is strongly recommended. We should adhere to the JNC VIII guidelines for HTN management and the NCEPATP III guidelines for LDL-C management. 2. Check 2D echocardiogram  3. Maximize cardiac medications  4. Low-dose diuretics  5. Coronary evaluation in future when feasible  6. Monitor on telemetry  7. GI/DVT prophylaxis  8.  Further recommendations to follow      Electronically signed by Sedrick Marques DO on 8/4/2021 at 10:56 AM

## 2021-08-04 NOTE — PROGRESS NOTES
Spiritual Care Services     Summary of Visit:  Initial visit (though she had been visited by Kenji Hendrickson on 8/3/21). Patient expressed that she was doing fine and had completed her [address] [facility] [address] with Kenji Hendrickson yesterday. This  offered prayer for the patient. Spiritual Assessment/Intervention/Outcomes:    Encounter Summary  Services provided to[de-identified] Patient  Referral/Consult From[de-identified] Rounding  Support System: Family members, Children  Place of Baptism: [employer] [address] [employer]  Contact Baptist: No  Continue Visiting: No  Complexity of Encounter: Low  Length of Encounter: 15 minutes  Spiritual Assessment Completed: Yes  Advance Care Planning: Yes  Routine  Type: Initial  Assessment: Calm, Approachable, Hopeful  Intervention: Explored feelings, thoughts, concerns, Nurtured hope, Prayer, Discussed illness/injury and it's impact, Discussed belief system/Taoist practices/joana  Outcome: Comfort, Expressed gratitude, Engaged in conversation, Encouraged, Receptive              Primary Decision Maker (Healthcare Proxy)  [facility] is[de-identified] Legal Next of Kin           Values / Beliefs  Do you have any ethnic, cultural, sacramental, or spiritual Taoist needs you would like us to be aware of while you are in the hospital?: No    Care Plan:    No follow up required. Spiritual Care Services   Electronically signed by Lelia Landry on 8/4/21 at 4:06 PM EDT     To reach a  for emotional and spiritual support, place an [facility] consult request.   If a  is needed immediately, dial 0 and ask to page the on-call .

## 2021-08-05 ENCOUNTER — CARE COORDINATION (OUTPATIENT)
Dept: CASE MANAGEMENT | Age: 66
End: 2021-08-05

## 2021-08-05 NOTE — CARE COORDINATION
[facility] Transitions Initial Follow Up Call    Call within 2 business days of discharge: Yes    Patient: Everette Laguerre Patient : 1955   MRN: [id number]  Reason for Admission: 8/3/2021 - 2021 Obs Atypical CP  Discharge Date: 21 RARS: No data recorded  NR  CT  Last Discharge [facility]       Complaint Diagnosis Description Type Department Provider    8/3/21 Shortness of Breath Atypical chest pain . .. ED to Hosp-Admission (Discharged) (ADMITTED) M[facility]1W Bobby Motta,         Initial CT outreach attempt. Left Hippa compliant VM and reminder to schedule appts. Needs appts Dr Gus Pate and PCP. Care Transitions 24 Hour Call    Care Transitions Interventions         Follow Up  No future appointments.     Warren Barton RN

## 2021-08-06 ENCOUNTER — CARE COORDINATION (OUTPATIENT)
Dept: CASE MANAGEMENT | Age: 66
End: 2021-08-06

## 2021-08-06 NOTE — CARE COORDINATION
[facility] Trans[facility] Initial Follow Up Call    Call within 2 business days of discharge: Yes    Patient: Alva Watts Patient : 1955   MRN: <[id number]>  Reason for Admission:Atypical chest pain  Dyspnea  abnormal EKG Discharge Date: 21 RARS: No data recorded    Last Discharge [facility]       Complaint Diagnosis Description Type Department Provider    8/3/21 Shortness of Breath Atypical chest pain . .. ED to Hosp-Admission (Discharged) (ADMITTED) MLOZ1W Kyle Pack DO           Spoke with: 24 hour initial call. 2nd and final attempt. No answer. Left HIPPA compliant message to return call to this writer.      Facility:[facility]    Non-face-to-face services provided:  Obtained and reviewed discharge summary and/or continuity of care documents    Care Transitions 24 Hour Call    Care Transitions Interventions         Follow Up  Future Appointments   Date Time Provider Monika Fields   2021  8:00 AM [place] [facility] MED INJECTION ROOM 2 [facility] NUC MED [facility] Fac RAD   2021  9:00 AM [place] NUCLEAR MEDICINE ROOM 2 [facility] NUC MED [facility] Fac RAD   2021  9:30 AM [facility] STRESS ROOM 1 [facility] STRESS [facility] Fac RAD   2021 10:30 AM [place] NUCLEAR MEDICINE ROOM 2 [facility] NUC MED [facility] Fac RAD       PK Perez LPN Care Transitions Nurse   [phone]

## 2021-08-27 ENCOUNTER — HOSPITAL ENCOUNTER (OUTPATIENT)
Dept: NUCLEAR MEDICINE | Age: 66
Discharge: HOME | End: 2021-08-29
Payer: COMMERCIAL

## 2021-08-27 ENCOUNTER — HOSPITAL ENCOUNTER (OUTPATIENT)
Dept: NON INVASIVE DIAGNOSTICS | Age: 66
Discharge: HOME | End: 2021-08-27
Payer: COMMERCIAL

## 2021-08-27 DIAGNOSIS — R06.02 SHORTNESS OF BREATH: ICD-10-CM

## 2021-08-27 LAB
LV EF: 81 %
LVEF MODALITY: NORMAL

## 2021-08-27 PROCEDURE — A9502 TC99M TETROFOSMIN: HCPCS | Performed by: INTERNAL MEDICINE

## 2021-08-27 PROCEDURE — 2580000003 HC RX 258: Performed by: INTERNAL MEDICINE

## 2021-08-27 PROCEDURE — 93017 CV STRESS TEST TRACING ONLY: CPT

## 2021-08-27 PROCEDURE — 6360000002 HC RX W HCPCS: Performed by: INTERNAL MEDICINE

## 2021-08-27 PROCEDURE — 3430000000 HC RX DIAGNOSTIC RADIOPHARMACEUTICAL: Performed by: INTERNAL MEDICINE

## 2021-08-27 PROCEDURE — 78452 HT MUSCLE IMAGE SPECT MULT: CPT

## 2021-08-27 PROCEDURE — 93018 CV STRESS TEST I&R ONLY: CPT | Performed by: INTERNAL MEDICINE

## 2021-08-27 RX ORDER — SODIUM CHLORIDE 0.9 % (FLUSH) 0.9 %
10 SYRINGE (ML) INJECTION PRN
Status: COMPLETED | OUTPATIENT
Start: 2021-08-27 | End: 2021-08-27

## 2021-08-27 RX ADMIN — Medication 10 ML: at 09:55

## 2021-08-27 RX ADMIN — Medication 10 ML: at 08:23

## 2021-08-27 RX ADMIN — TETROFOSMIN 32.4 MILLICURIE: 1.38 INJECTION, POWDER, LYOPHILIZED, FOR SOLUTION INTRAVENOUS at 09:55

## 2021-08-27 RX ADMIN — REGADENOSON 0.4 MG: 0.08 INJECTION, SOLUTION INTRAVENOUS at 09:55

## 2021-08-27 RX ADMIN — TETROFOSMIN 11.8 MILLICURIE: 1.38 INJECTION, POWDER, LYOPHILIZED, FOR SOLUTION INTRAVENOUS at 08:23

## 2021-08-27 RX ADMIN — Medication 10 ML: at 09:56

## 2021-08-27 NOTE — PROGRESS NOTES
Reviewed history, allergies, and medications. Patient held her home medications prior to testing. Consent confirmed. Lexiscan exam explained. Placed patient on monitor. @480 Nuclear Medicine tech here to inject Grace Rouse. SOB noted during recovery phase. Denied chest pain. No ectopy noted. Patient off monitor and instructed to eat, will have last part of exam in 1 hour.

## 2021-09-01 ENCOUNTER — TELEPHONE (OUTPATIENT)
Dept: CARDIOLOGY CLINIC | Age: 66
End: 2021-09-01

## 2021-09-03 NOTE — TELEPHONE ENCOUNTER
Stress test is normal. Please notify patient.      Electronically signed by Negro Shelton,  on 9/3/2021 at 9:33 AM

## 2021-09-08 ENCOUNTER — OFFICE VISIT (OUTPATIENT)
Dept: CARDIOLOGY CLINIC | Age: 66
End: 2021-09-08
Payer: COMMERCIAL

## 2021-09-08 VITALS
BODY MASS INDEX: 18.75 KG/M2 | HEIGHT: 59 IN | WEIGHT: 93 LBS | SYSTOLIC BLOOD PRESSURE: 110 MMHG | HEART RATE: 67 BPM | DIASTOLIC BLOOD PRESSURE: 70 MMHG | OXYGEN SATURATION: 94 %

## 2021-09-08 DIAGNOSIS — R06.02 SHORTNESS OF BREATH AT REST: ICD-10-CM

## 2021-09-08 DIAGNOSIS — R94.31 ABNORMAL ELECTROCARDIOGRAPHY: Primary | ICD-10-CM

## 2021-09-08 PROCEDURE — 99214 OFFICE O/P EST MOD 30 MIN: CPT | Performed by: INTERNAL MEDICINE

## 2021-09-08 PROCEDURE — 1111F DSCHRG MED/CURRENT MED MERGE: CPT | Performed by: INTERNAL MEDICINE

## 2021-09-08 RX ORDER — ALBUTEROL SULFATE 90 UG/1
2 AEROSOL, METERED RESPIRATORY (INHALATION) 4 TIMES DAILY PRN
Qty: 54 G | Refills: 1 | Status: SHIPPED | OUTPATIENT
Start: 2021-09-08

## 2021-09-08 RX ORDER — METHYLPREDNISOLONE 4 MG/1
TABLET ORAL
Qty: 1 KIT | Refills: 1 | Status: SHIPPED | OUTPATIENT
Start: 2021-09-08 | End: 2021-09-14

## 2021-09-08 ASSESSMENT — ENCOUNTER SYMPTOMS
BLOOD IN STOOL: 0
VOMITING: 0
APNEA: 0
COUGH: 1
NAUSEA: 0
SHORTNESS OF BREATH: 1
CHEST TIGHTNESS: 0

## 2021-09-08 NOTE — PROGRESS NOTES
[facility] CARDIOLOGY OFFICE FOLLOW-UP      Patient: Tha Moncada  YOB: 1955  MRN: [id number]    Chief Complaint:  Chief Complaint   Patient presents with    Follow-Up from [address] [place] [address] Results     OF ECHOCARDIOGRAM         Subjective/HPI:  9/8/21: Patient presents today for shortness of breath. Echocardiogram showed preserved LV ejection fraction. Accompanied by her older sister who looks younger than her. Patient of Dr. Amanda Telles. Stress test was negative for ischemia. Normal left regurgitant fraction. She still gets short of breath on minimal activity. She used to work at [employer], she tried to return to work but could not even do 1 day. She lives by herself. Quit smoking 16 years ago. I want her to take the Lasix in the morning and discontinue aspirin. Past Medical History:   Diagnosis Date    Bronchitis     Hypertension        Past Surgical History:   Procedure Laterality Date    LUNG SURGERY      lump found 1980's    TUBAL LIGATION         No family history on file. Social History     Socioeconomic History    Marital status:      Spouse name: Not on file    Number of children: Not on file    Years of education: Not on file    Highest education level: Not on file   Occupational History    Not on file   Tobacco Use    Smoking status: Former Smoker    Smokeless tobacco: Never Used   Substance and Sexual Activity    Alcohol use: Yes     Comment: occas.  Drug use: Never    Sexual activity: Not on file   Other Topics Concern    Not on file   Social History Narrative    Not on file     Social Determinants of Health     Financial Resource Strain:     Difficulty of Paying Living Expenses:    Food Insecurity:     Worried About Running Out of Food in the Last Year:     [address] in the Last Year:    Transportation Needs:     Lack of Transportation (Medical):      Lack of Transportation (Non-Medical):    Physical Activity:     Days of Exercise per Week:     Minutes of Exercise per Session:    Stress:     Feeling of Stress :    Social Connections:     Frequency of Communication with Friends and Family:     Frequency of Social Gatherings with Friends and Family:     Attends Sabianist Services:     Active Member of Clubs or Organizations:     Attends Club or Organization Meetings:     Marital Status:    Intimate Partner Violence:     Fear of Current or Ex-Partner:     Emotionally Abused:     Physically Abused:     Sexually Abused: Allergies   Allergen Reactions    Codeine        Current Outpatient Medications   Medication Sig Dispense Refill    albuterol sulfate HFA (VENTOLIN HFA) 108 (90 Base) MCG/ACT inhaler Inhale 2 puffs into the lungs 4 times daily as needed for Wheezing 54 g 1    methylPREDNISolone (MEDROL, SHALINI,) 4 MG tablet As directed per package. Generic equivalent acceptable, unless otherwise noted. 1 kit 1    aspirin 81 MG chewable tablet Take 1 tablet by mouth daily 30 tablet 3    furosemide (LASIX) 20 MG tablet Take 1 tablet by mouth daily 60 tablet 3    atenolol (TENORMIN) 50 MG tablet Take by mouth daily       amLODIPine (NORVASC) 5 MG tablet Take 5 mg by mouth daily       omeprazole (PRILOSEC) 20 MG delayed release capsule Take 20 mg by mouth daily       No current facility-administered medications for this visit. Review of Systems:   Review of Systems   Constitutional: Negative for appetite change, diaphoresis and fatigue. HENT: Negative for nosebleeds. Respiratory: Positive for cough and shortness of breath. Negative for apnea and chest tightness. Cardiovascular: Negative for chest pain, palpitations and leg swelling. Gastrointestinal: Negative for blood in stool, nausea and vomiting. Musculoskeletal: Negative for myalgias. Skin: Negative. Neurological: Negative for dizziness, seizures, syncope, weakness, light-headedness and headaches.    Psychiatric/Behavioral: Negative for agitation, behavioral problems, confusion and decreased concentration. The patient is not nervous/anxious and is not hyperactive. All other systems reviewed and are negative. Review of System is negative except for as mentioned above. Physical Examination:    /70 (Site: Left Upper Arm, Position: Sitting, Cuff Size: Small Adult)   Pulse 67   Ht 4' 11\" (1.499 m)   Wt 93 lb (42.2 kg)   SpO2 94%   BMI 18.78 kg/m²    Physical Exam   Constitutional: She appears healthy. HENT:   Nose: Nose normal.   Mouth/Throat: Dentition is normal. Oropharynx is clear. Eyes: Pupils are equal, round, and reactive to light. Cardiovascular: Normal rate, regular rhythm, S1 normal, S2 normal, normal heart sounds, intact distal pulses and normal pulses. No extrasystoles are present. Exam reveals no gallop. No murmur heard. Pulmonary/Chest: Effort normal and breath sounds normal. She has no wheezes. She has no rales. She exhibits no tenderness. Abdominal: Soft. Bowel sounds are normal. She exhibits no distension and no mass. There is no splenomegaly or hepatomegaly. There is no abdominal tenderness. Musculoskeletal:         General: No tenderness, deformity or edema. Normal range of motion. Cervical back: Normal range of motion. Neurological: She is alert and oriented to person, place, and time. She has normal motor skills and normal reflexes. Gait normal.   Skin: Skin is warm and dry.            Patient Active Problem List   Diagnosis    Atypical chest pain    Abnormal electrocardiography    Gastroesophageal reflux disease    Hypertension    Mixed hyperlipidemia    Shortness of breath at rest    Vitamin D deficiency           Orders Placed This Encounter   Procedures    ME DISCHARGE MEDS RECONCILED W/ CURRENT OUTPATIENT MED LIST         Orders Placed This Encounter   Medications    albuterol sulfate HFA (VENTOLIN HFA) 108 (90 Base) MCG/ACT inhaler     Sig: Inhale 2 puffs into the lungs 4 times daily as needed for Wheezing     Dispense:  54 g     Refill:  1    methylPREDNISolone (MEDROL, SHALINI,) 4 MG tablet     Sig: As directed per package. Generic equivalent acceptable, unless otherwise noted. Dispense:  1 kit     Refill:  1             Assessment/Orders:   Shortness of breath undetermined etiology  Normal left regular ejection fraction  Negative stress test  Possibly significant COPD due to smoking 16 years ago  History of GERD      Plan:  Discontinue aspirin, take Lasix in the morning and atenolol at night.  Take Norvasc in the morning and take Prilosec at night   Prescribed Medrol pack  And Inhaler  See me in a few weeks, until then not to go back to work    See me in 2 weeks        Electronically signed by: Tre Biggs MD  9/9/2021 3:00 PM

## 2021-09-09 PROBLEM — R94.31 ABNORMAL ELECTROCARDIOGRAPHY: Status: ACTIVE | Noted: 2021-09-09

## 2021-09-09 PROBLEM — E55.9 VITAMIN D DEFICIENCY: Status: ACTIVE | Noted: 2021-09-09

## 2021-09-09 PROBLEM — E78.2 MIXED HYPERLIPIDEMIA: Status: ACTIVE | Noted: 2021-09-09

## 2021-09-09 PROBLEM — R06.02 SHORTNESS OF BREATH AT REST: Status: ACTIVE | Noted: 2021-09-09

## 2021-09-09 PROBLEM — I10 HYPERTENSION: Status: ACTIVE | Noted: 2018-05-31

## 2021-09-09 PROBLEM — K21.9 GASTROESOPHAGEAL REFLUX DISEASE: Status: ACTIVE | Noted: 2021-09-09

## 2021-09-22 ENCOUNTER — OFFICE VISIT (OUTPATIENT)
Dept: CARDIOLOGY CLINIC | Age: 66
End: 2021-09-22
Payer: COMMERCIAL

## 2021-09-22 VITALS
SYSTOLIC BLOOD PRESSURE: 150 MMHG | OXYGEN SATURATION: 94 % | HEART RATE: 114 BPM | HEIGHT: 59 IN | BODY MASS INDEX: 17.94 KG/M2 | DIASTOLIC BLOOD PRESSURE: 88 MMHG | WEIGHT: 89 LBS

## 2021-09-22 DIAGNOSIS — R06.02 SHORTNESS OF BREATH AT REST: ICD-10-CM

## 2021-09-22 DIAGNOSIS — R07.9 CHEST PAIN, UNSPECIFIED TYPE: Primary | ICD-10-CM

## 2021-09-22 PROCEDURE — 99214 OFFICE O/P EST MOD 30 MIN: CPT | Performed by: INTERNAL MEDICINE

## 2021-09-22 RX ORDER — AMLODIPINE BESYLATE 5 MG/1
5 TABLET ORAL DAILY
Qty: 90 TABLET | Refills: 1 | Status: SHIPPED | OUTPATIENT
Start: 2021-09-22

## 2021-09-22 RX ORDER — ATENOLOL 50 MG/1
50 TABLET ORAL DAILY
Qty: 90 TABLET | Refills: 1 | Status: SHIPPED | OUTPATIENT
Start: 2021-09-22

## 2021-09-22 ASSESSMENT — ENCOUNTER SYMPTOMS
SHORTNESS OF BREATH: 0
CHEST TIGHTNESS: 0

## 2021-09-22 NOTE — PROGRESS NOTES
[facility] CARDIOLOGY OFFICE FOLLOW-UP      Patient: Madeline Baez  YOB: 1955  MRN: [id number]    Chief Complaint:  Chief Complaint   Patient presents with    Follow-up    Shortness of Breath    Abnormal Test Results         Subjective/HPI:  9/22/21: Patient presents today for shortness of breath. Echo showed preserved LV ejection fraction. She has lost 41 pounds. Although the LV ejection fraction was normal, she gets short of breath on minimal activity. No ankle edema. She has hypertension and possibly some LV diastolic dysfunction. She has been vaccinated against Covid. She is a former smoker. Quit in the past.  Used to work at [employer] but cannot work anymore because on minimal activity she gets dyspneic. Hopefully with weight loss that might help with some of those symptoms. Blood pressure remains controlled. 9/8/21: Patient presents today for shortness of breath. Echocardiogram showed preserved LV ejection fraction. Accompanied by her older sister who looks younger than her. Patient of Dr. Jocelyn Moss test was negative for ischemia. Normal left ventricular ejection fraction. She still gets short of breath on minimal activity. She used to work at [employer], she tried to return to work but could not even do 1 day. She lives by herself. Quit smoking 16 years ago. I want her to take the Lasix in the morning and discontinue aspirin. Past Medical History:   Diagnosis Date    Bronchitis     Hypertension        Past Surgical History:   Procedure Laterality Date    LUNG SURGERY      lump found 1980's    TUBAL LIGATION         No family history on file.     Social History     Socioeconomic History    Marital status:      Spouse name: None    Number of children: None    Years of education: None    Highest education level: None   Occupational History    None   Tobacco Use    Smoking status: Former Smoker    Smokeless tobacco: Never Used   Substance and Sexual Activity    Alcohol use: Yes     Comment: occas.  Drug use: Never    Sexual activity: None   Other Topics Concern    None   Social History Narrative    None     Social Determinants of Health     Financial Resource Strain:     Difficulty of Paying Living Expenses:    Food Insecurity:     Worried About Running Out of Food in the Last Year:     [address] in the Last Year:    Transportation Needs:     Lack of Transportation (Medical):  Lack of Transportation (Non-Medical):    Physical Activity:     Days of Exercise per Week:     Minutes of Exercise per Session:    Stress:     Feeling of Stress :    Social Connections:     Frequency of Communication with Friends and Family:     Frequency of Social Gatherings with Friends and Family:     Attends [employer] Services:     Active Member of Clubs or Organizations:     Attends Club or Organization Meetings:     Marital Status:    Intimate Partner Violence:     Fear of Current or Ex-Partner:     Emotionally Abused:     Physically Abused:     Sexually Abused: Allergies   Allergen Reactions    Codeine        Current Outpatient Medications   Medication Sig Dispense Refill    amLODIPine (NORVASC) 5 MG tablet Take 1 tablet by mouth daily 90 tablet 1    atenolol (TENORMIN) 50 MG tablet Take 1 tablet by mouth daily 90 tablet 1    albuterol sulfate HFA (VENTOLIN HFA) 108 (90 Base) MCG/ACT inhaler Inhale 2 puffs into the lungs 4 times daily as needed for Wheezing 54 g 1    aspirin 81 MG chewable tablet Take 1 tablet by mouth daily 30 tablet 3    furosemide (LASIX) 20 MG tablet Take 1 tablet by mouth daily 60 tablet 3    omeprazole (PRILOSEC) 20 MG delayed release capsule Take 20 mg by mouth daily       No current facility-administered medications for this visit. Review of Systems:   Review of Systems   Constitutional: Negative for appetite change, diaphoresis and fatigue.    Respiratory: Negative for apnea, chest tightness and shortness of breath. Rib pain   Cardiovascular: Negative for chest pain, palpitations and leg swelling. Musculoskeletal: Negative for myalgias. Skin: Negative for color change, pallor, rash and wound. Neurological: Positive for dizziness, weakness and light-headedness. Negative for syncope, numbness and headaches. Hematological: Does not bruise/bleed easily. Psychiatric/Behavioral: Negative for agitation, behavioral problems and confusion. The patient is not nervous/anxious and is not hyperactive. Review of System is negative except for as mentioned above. Physical Examination:    BP (!) 150/88 (Site: Left Upper Arm, Position: Sitting, Cuff Size: Medium Adult)   Pulse 114   Ht 4' 11\" (1.499 m)   Wt 89 lb (40.4 kg)   SpO2 94%   BMI 17.98 kg/m²    Physical Exam   Constitutional: She appears healthy. HENT:   Nose: Nose normal.   Mouth/Throat: Dentition is normal. Oropharynx is clear. Eyes: Pupils are equal, round, and reactive to light. Cardiovascular: Normal rate, regular rhythm, S1 normal, S2 normal, normal heart sounds, intact distal pulses and normal pulses. No extrasystoles are present. Exam reveals no gallop. No murmur heard. Pulmonary/Chest: Effort normal and breath sounds normal. She has no wheezes. She has no rales. She exhibits no tenderness. Abdominal: Soft. Bowel sounds are normal. She exhibits no distension and no mass. There is no splenomegaly or hepatomegaly. There is no abdominal tenderness. Musculoskeletal:         General: No tenderness, deformity or edema. Normal range of motion. Cervical back: Normal range of motion. Neurological: She is alert and oriented to person, place, and time. She has normal motor skills and normal reflexes. Gait normal.   Skin: Skin is warm and dry.            Patient Active Problem List   Diagnosis    Atypical chest pain    Abnormal electrocardiography    Gastroesophageal reflux disease    Hypertension    Mixed hyperlipidemia    Shortness of breath at rest    Vitamin D deficiency           Orders Placed This Encounter   Procedures    XR CHEST STANDARD (2 VW)     Standing Status:   Future     Number of Occurrences:   1     Standing Expiration Date:   9/22/2022     Order Specific Question:   Reason for exam:     Answer:   chest pain     Order Specific Question:   Reason for exam:     Answer:   shortness of breath         Orders Placed This Encounter   Medications    amLODIPine (NORVASC) 5 MG tablet     Sig: Take 1 tablet by mouth daily     Dispense:  90 tablet     Refill:  1    atenolol (TENORMIN) 50 MG tablet     Sig: Take 1 tablet by mouth daily     Dispense:  90 tablet     Refill:  1             Assessment/Orders:       ICD-10-CM    1. Chest pain, unspecified type  R07.9 XR CHEST STANDARD (2 VW)   2. Shortness of breath at rest  R06.02        Orders Placed This Encounter   Medications    amLODIPine (NORVASC) 5 MG tablet     Sig: Take 1 tablet by mouth daily     Dispense:  90 tablet     Refill:  1    atenolol (TENORMIN) 50 MG tablet     Sig: Take 1 tablet by mouth daily     Dispense:  90 tablet     Refill:  1       Medications Discontinued During This Encounter   Medication Reason    atenolol (TENORMIN) 50 MG tablet REORDER    amLODIPine (NORVASC) 5 MG tablet REORDER       Orders Placed This Encounter   Procedures    XR CHEST STANDARD (2 VW)     Standing Status:   Future     Number of Occurrences:   1     Standing Expiration Date:   9/22/2022     Order Specific Question:   Reason for exam:     Answer:   chest pain     Order Specific Question:   Reason for exam:     Answer:   shortness of breath         Plan:  Obtain a chest Xray  Continue with the atenolol and the Norvasc  Continue with Lasix which is probably due to pedal edema secondary to Norvasc and is known to have preserved LV ejection fraction  See me in few weeks    .           Electronically signed by: Ann Marie Lorenz MD  10/4/2021 3:56 PM

## 2021-10-04 ASSESSMENT — ENCOUNTER SYMPTOMS
COLOR CHANGE: 0
APNEA: 0

## 2022-10-12 ENCOUNTER — OFFICE VISIT (OUTPATIENT)
Dept: FAMILY MEDICINE CLINIC | Age: 67
End: 2022-10-12
Payer: MEDICARE

## 2022-10-12 VITALS
TEMPERATURE: 97.3 F | WEIGHT: 99 LBS | HEART RATE: 113 BPM | HEIGHT: 59 IN | BODY MASS INDEX: 19.96 KG/M2 | OXYGEN SATURATION: 96 % | DIASTOLIC BLOOD PRESSURE: 68 MMHG | SYSTOLIC BLOOD PRESSURE: 130 MMHG

## 2022-10-12 DIAGNOSIS — J44.0 ACUTE BRONCHITIS WITH COPD (HCC): Primary | ICD-10-CM

## 2022-10-12 DIAGNOSIS — J20.9 ACUTE BRONCHITIS WITH COPD (HCC): Primary | ICD-10-CM

## 2022-10-12 DIAGNOSIS — J06.9 UPPER RESPIRATORY INFECTION WITH COUGH AND CONGESTION: ICD-10-CM

## 2022-10-12 LAB
INFLUENZA A ANTIBODY: NORMAL
INFLUENZA B ANTIBODY: NORMAL
Lab: NORMAL
PERFORMING INSTRUMENT: NORMAL
QC PASS/FAIL: NORMAL
S PYO AG THROAT QL: NORMAL
SARS-COV-2, POC: NORMAL

## 2022-10-12 PROCEDURE — 87880 STREP A ASSAY W/OPTIC: CPT | Performed by: NURSE PRACTITIONER

## 2022-10-12 PROCEDURE — 3023F SPIROM DOC REV: CPT | Performed by: NURSE PRACTITIONER

## 2022-10-12 PROCEDURE — G8400 PT W/DXA NO RESULTS DOC: HCPCS | Performed by: NURSE PRACTITIONER

## 2022-10-12 PROCEDURE — 87804 INFLUENZA ASSAY W/OPTIC: CPT | Performed by: NURSE PRACTITIONER

## 2022-10-12 PROCEDURE — 87426 SARSCOV CORONAVIRUS AG IA: CPT | Performed by: NURSE PRACTITIONER

## 2022-10-12 PROCEDURE — 1090F PRES/ABSN URINE INCON ASSESS: CPT | Performed by: NURSE PRACTITIONER

## 2022-10-12 PROCEDURE — G8420 CALC BMI NORM PARAMETERS: HCPCS | Performed by: NURSE PRACTITIONER

## 2022-10-12 PROCEDURE — G8427 DOCREV CUR MEDS BY ELIG CLIN: HCPCS | Performed by: NURSE PRACTITIONER

## 2022-10-12 PROCEDURE — 1036F TOBACCO NON-USER: CPT | Performed by: NURSE PRACTITIONER

## 2022-10-12 PROCEDURE — 3017F COLORECTAL CA SCREEN DOC REV: CPT | Performed by: NURSE PRACTITIONER

## 2022-10-12 PROCEDURE — G8484 FLU IMMUNIZE NO ADMIN: HCPCS | Performed by: NURSE PRACTITIONER

## 2022-10-12 PROCEDURE — 99214 OFFICE O/P EST MOD 30 MIN: CPT | Performed by: NURSE PRACTITIONER

## 2022-10-12 PROCEDURE — 1123F ACP DISCUSS/DSCN MKR DOCD: CPT | Performed by: NURSE PRACTITIONER

## 2022-10-12 RX ORDER — PREDNISONE 10 MG/1
TABLET ORAL
Qty: 41 TABLET | Refills: 0 | Status: SHIPPED | OUTPATIENT
Start: 2022-10-12

## 2022-10-12 RX ORDER — BROMPHENIRAMINE MALEATE, PSEUDOEPHEDRINE HYDROCHLORIDE, AND DEXTROMETHORPHAN HYDROBROMIDE 2; 30; 10 MG/5ML; MG/5ML; MG/5ML
SYRUP ORAL
COMMUNITY
Start: 2022-07-29 | End: 2022-10-12 | Stop reason: SDUPTHER

## 2022-10-12 RX ORDER — DOXYCYCLINE HYCLATE 100 MG
100 TABLET ORAL 2 TIMES DAILY
Qty: 20 TABLET | Refills: 0 | Status: SHIPPED | OUTPATIENT
Start: 2022-10-12 | End: 2022-10-22

## 2022-10-12 RX ORDER — GUAIFENESIN 600 MG/1
1200 TABLET, EXTENDED RELEASE ORAL 2 TIMES DAILY
Qty: 40 TABLET | Refills: 0 | Status: SHIPPED | OUTPATIENT
Start: 2022-10-12 | End: 2022-10-22

## 2022-10-12 RX ORDER — BROMPHENIRAMINE MALEATE, PSEUDOEPHEDRINE HYDROCHLORIDE, AND DEXTROMETHORPHAN HYDROBROMIDE 2; 30; 10 MG/5ML; MG/5ML; MG/5ML
SYRUP ORAL
Qty: 118 ML | Refills: 0 | Status: SHIPPED | OUTPATIENT
Start: 2022-10-12

## 2022-10-12 SDOH — ECONOMIC STABILITY: FOOD INSECURITY: WITHIN THE PAST 12 MONTHS, THE FOOD YOU BOUGHT JUST DIDN'T LAST AND YOU DIDN'T HAVE MONEY TO GET MORE.: NEVER TRUE

## 2022-10-12 SDOH — ECONOMIC STABILITY: FOOD INSECURITY: WITHIN THE PAST 12 MONTHS, YOU WORRIED THAT YOUR FOOD WOULD RUN OUT BEFORE YOU GOT MONEY TO BUY MORE.: NEVER TRUE

## 2022-10-12 ASSESSMENT — PATIENT HEALTH QUESTIONNAIRE - PHQ9
1. LITTLE INTEREST OR PLEASURE IN DOING THINGS: 0
2. FEELING DOWN, DEPRESSED OR HOPELESS: 0
SUM OF ALL RESPONSES TO PHQ QUESTIONS 1-9: 0
SUM OF ALL RESPONSES TO PHQ QUESTIONS 1-9: 0
SUM OF ALL RESPONSES TO PHQ9 QUESTIONS 1 & 2: 0
SUM OF ALL RESPONSES TO PHQ QUESTIONS 1-9: 0
SUM OF ALL RESPONSES TO PHQ QUESTIONS 1-9: 0

## 2022-10-12 ASSESSMENT — ENCOUNTER SYMPTOMS
VOMITING: 0
RHINORRHEA: 1
NAUSEA: 1
WHEEZING: 1
DIARRHEA: 0
SHORTNESS OF BREATH: 1
COUGH: 1
SORE THROAT: 1

## 2022-10-12 ASSESSMENT — SOCIAL DETERMINANTS OF HEALTH (SDOH): HOW HARD IS IT FOR YOU TO PAY FOR THE VERY BASICS LIKE FOOD, HOUSING, MEDICAL CARE, AND HEATING?: NOT HARD AT ALL

## 2022-10-12 NOTE — PROGRESS NOTES
Subjective:      Patient ID: Satish Bass is a 79 y.o. female who presents today for:  Chief Complaint   Patient presents with    Fever    Cough     Started Friday night    Congestion    Pharyngitis    Generalized Body Aches       Fever   Associated symptoms include coughing, headaches, nausea, a sore throat and wheezing. Pertinent negatives include no congestion, diarrhea, rash or vomiting. Cough  Associated symptoms include a fever, headaches, myalgias, postnasal drip, rhinorrhea, a sore throat, shortness of breath and wheezing. Pertinent negatives include no rash. Pharyngitis  Associated symptoms include coughing, a fever, headaches, myalgias, nausea and a sore throat. Pertinent negatives include no congestion, rash or vomiting. Generalized Body Aches  Associated symptoms include coughing, a fever, headaches, myalgias, nausea and a sore throat. Pertinent negatives include no congestion, rash or vomiting. Started on Friday with a dry cough and uri   Now congested in the chest   This morning she had a fever of 102   She has COPD   Maybe a little more SOB compared to her norm   She's on trilogy and rescue inhaler   She does not smoke           Past Medical History:   Diagnosis Date    Bronchitis     Hypertension      Past Surgical History:   Procedure Laterality Date    LUNG SURGERY      lump found 1980's    TUBAL LIGATION       Social History     Socioeconomic History    Marital status:      Spouse name: Not on file    Number of children: Not on file    Years of education: Not on file    Highest education level: Not on file   Occupational History    Not on file   Tobacco Use    Smoking status: Former    Smokeless tobacco: Never   Substance and Sexual Activity    Alcohol use: Yes     Comment: occas.     Drug use: Never    Sexual activity: Not on file   Other Topics Concern    Not on file   Social History Narrative    Not on file     Social Determinants of Health     Financial Resource Strain: Low Risk     Difficulty of Paying Living Expenses: Not hard at all   Food Insecurity: No Food Insecurity    Worried About Running Out of Food in the Last Year: Never true    Ran Out of Food in the Last Year: Never true   Transportation Needs: Not on file   Physical Activity: Not on file   Stress: Not on file   Social Connections: Not on file   Intimate Partner Violence: Not on file   Housing Stability: Not on file     History reviewed. No pertinent family history. Allergies   Allergen Reactions    Codeine      Current Outpatient Medications   Medication Sig Dispense Refill    predniSONE (DELTASONE) 10 MG tablet Take 5 tabs daily for 3 days, then 4 tabs daily for 3 days then 3 tabs daily for 3 days then 2 tabs daily for 2 days, then 1 tab daily for one day. 41 tablet 0    doxycycline hyclate (VIBRA-TABS) 100 MG tablet Take 1 tablet by mouth 2 times daily for 10 days 20 tablet 0    brompheniramine-pseudoephedrine-DM 2-30-10 MG/5ML syrup TAKE 10 ML BY MOUTH EVERY 6 HOURS AS NEEDED FOR COUGH 118 mL 0    guaiFENesin (MUCINEX) 600 MG extended release tablet Take 2 tablets by mouth 2 times daily for 10 days 40 tablet 0    amLODIPine (NORVASC) 5 MG tablet Take 1 tablet by mouth daily 90 tablet 1    atenolol (TENORMIN) 50 MG tablet Take 1 tablet by mouth daily 90 tablet 1    albuterol sulfate HFA (VENTOLIN HFA) 108 (90 Base) MCG/ACT inhaler Inhale 2 puffs into the lungs 4 times daily as needed for Wheezing 54 g 1    omeprazole (PRILOSEC) 20 MG delayed release capsule Take 20 mg by mouth daily      furosemide (LASIX) 20 MG tablet Take 1 tablet by mouth daily (Patient not taking: Reported on 10/12/2022) 60 tablet 3     No current facility-administered medications for this visit. Review of Systems   Constitutional:  Positive for appetite change and fever. HENT:  Positive for postnasal drip, rhinorrhea and sore throat. Negative for congestion. Respiratory:  Positive for cough, shortness of breath and wheezing. Gastrointestinal:  Positive for nausea. Negative for diarrhea and vomiting. Musculoskeletal:  Positive for myalgias. Skin:  Negative for rash. Neurological:  Positive for headaches. Negative for dizziness and light-headedness. Psychiatric/Behavioral:  Negative for agitation, confusion and hallucinations. Objective:   /68   Pulse (!) 113   Temp 97.3 °F (36.3 °C)   Ht 4' 11\" (1.499 m)   Wt 99 lb (44.9 kg)   SpO2 96%   BMI 20.00 kg/m²     Physical Exam  Vitals reviewed. Constitutional:       Appearance: Normal appearance. HENT:      Head: Normocephalic and atraumatic. Right Ear: There is impacted cerumen. Left Ear: Hearing, tympanic membrane, ear canal and external ear normal.  No middle ear effusion. No foreign body. Tympanic membrane is not injected, erythematous or bulging. Nose: Nose normal. No congestion or rhinorrhea. Right Nostril: No foreign body. Left Nostril: No foreign body. Right Turbinates: Not enlarged. Left Turbinates: Not enlarged. Right Sinus: No maxillary sinus tenderness or frontal sinus tenderness. Left Sinus: No maxillary sinus tenderness or frontal sinus tenderness. Mouth/Throat:      Lips: Pink. Mouth: Mucous membranes are moist.      Pharynx: Oropharynx is clear. Uvula midline. No pharyngeal swelling, oropharyngeal exudate, posterior oropharyngeal erythema or uvula swelling. Tonsils: No tonsillar exudate or tonsillar abscesses. Eyes:      General: Lids are normal.         Right eye: No foreign body. Left eye: No foreign body. Extraocular Movements: Extraocular movements intact. Conjunctiva/sclera: Conjunctivae normal.   Cardiovascular:      Rate and Rhythm: Normal rate and regular rhythm. Heart sounds: Normal heart sounds. Pulmonary:      Effort: Pulmonary effort is normal. No tachypnea, accessory muscle usage or respiratory distress. Breath sounds: Wheezing present.  No rhonchi. Comments: Harsh frequent cough   Abdominal:      Tenderness: There is no abdominal tenderness. There is no guarding. Musculoskeletal:         General: Normal range of motion. Cervical back: Normal range of motion. Lymphadenopathy:      Cervical: No cervical adenopathy. Upper Body:      Right upper body: No supraclavicular adenopathy. Left upper body: No supraclavicular adenopathy. Skin:     General: Skin is warm and dry. Capillary Refill: Capillary refill takes less than 2 seconds. Neurological:      General: No focal deficit present. Mental Status: She is alert and oriented to person, place, and time. Gait: Gait is intact. Psychiatric:         Mood and Affect: Mood normal.         Speech: Speech normal.         Behavior: Behavior normal. Behavior is cooperative. Thought Content: Thought content normal.         Judgment: Judgment normal.       Assessment:       Diagnosis Orders   1. Acute bronchitis with COPD ([facility])  doxycycline hyclate (VIBRA-TABS) 100 MG tablet    brompheniramine-pseudoephedrine-DM 2-30-10 MG/5ML syrup    guaiFENesin (MUCINEX) 600 MG extended release tablet      2. Upper respiratory infection with cough and congestion  POCT Influenza A/B    POCT COVID-19, Antigen    POCT rapid strep A        Results for POC orders placed in visit on 10/12/22   POCT Influenza A/B   Result Value Ref Range    Influenza A Ab -     Influenza B Ab -    POCT rapid strep A   Result Value Ref Range    Strep A Ag None Detected None Detected      Plan:     Assessment & Plan   Jennifer Hartley was seen today for fever, cough, congestion, pharyngitis and generalized body aches. Diagnoses and all orders for this visit:    Acute bronchitis with COPD ([facility]  -     doxycycline hyclate (VIBRA-TABS) 100 MG tablet;  Take 1 tablet by mouth 2 times daily for 10 days  -     brompheniramine-pseudoephedrine-DM 2-30-10 MG/5ML syrup; TAKE 10 ML BY MOUTH EVERY 6 HOURS AS NEEDED FOR COUGH  - guaiFENesin (MUCINEX) 600 MG extended release tablet; Take 2 tablets by mouth 2 times daily for 10 days    Upper respiratory infection with cough and congestion  -     POCT Influenza A/B  -     POCT COVID-19, Antigen  -     POCT rapid strep A    Other orders  -     predniSONE (DELTASONE) 10 MG tablet; Take 5 tabs daily for 3 days, then 4 tabs daily for 3 days then 3 tabs daily for 3 days then 2 tabs daily for 2 days, then 1 tab daily for one day. Orders Placed This Encounter   Procedures    POCT Influenza A/B    POCT COVID-19, Antigen     Order Specific Question:   Is this test for diagnosis or screening? Answer:   Screening     Order Specific Question:   Symptomatic for COVID-19 as defined by CDC? Answer:   No     Order Specific Question:   Date of Symptom Onset     Answer:   N/A     Order Specific Question:   Hospitalized for COVID-19? Answer:   No     Order Specific Question:   Admitted to ICU for COVID-19? Answer:   No     Order Specific Question:   Employed in healthcare setting? Answer:   Unknown     Order Specific Question:   Resident in a congregate (group) care setting? Answer:   Unknown     Order Specific Question:   Pregnant? Answer:   No     Order Specific Question:   Previously tested for COVID-19? Answer:   Unknown    POCT rapid strep A     Orders Placed This Encounter   Medications    predniSONE (DELTASONE) 10 MG tablet     Sig: Take 5 tabs daily for 3 days, then 4 tabs daily for 3 days then 3 tabs daily for 3 days then 2 tabs daily for 2 days, then 1 tab daily for one day.      Dispense:  41 tablet     Refill:  0    doxycycline hyclate (VIBRA-TABS) 100 MG tablet     Sig: Take 1 tablet by mouth 2 times daily for 10 days     Dispense:  20 tablet     Refill:  0    brompheniramine-pseudoephedrine-DM 2-30-10 MG/5ML syrup     Sig: TAKE 10 ML BY MOUTH EVERY 6 HOURS AS NEEDED FOR COUGH     Dispense:  118 mL     Refill:  0    guaiFENesin (MUCINEX) 600 MG extended release tablet     Sig: Take 2 tablets by mouth 2 times daily for 10 days     Dispense:  40 tablet     Refill:  0       Medications Discontinued During This Encounter   Medication Reason    aspirin 81 MG chewable tablet LIST CLEANUP    brompheniramine-pseudoephedrine-DM 2-30-10 MG/5ML syrup REORDER     Return in about 1 week (around 10/19/2022) for Follow up with PCP. Reviewed with the patient/family: current clinical status & medications. Side effects of the medication prescribed today, as well as treatment plan/rationale and result expectations have been discussed with the patient/family who expresses understanding. Patient will be discharged home in stable condition. Follow up with PCP to evaluate treatment results or return if symptoms worsen or fail to improve. Discussed signs and symptoms which require immediate follow-up in ED/call to 911. Understanding verbalized. I have reviewed the patient's medical history in detail and updated the computerized patient record.     Wesley Ramos, LUX - CNP

## 2023-05-10 PROBLEM — J40 BRONCHITIS: Status: ACTIVE | Noted: 2023-05-10

## 2023-05-10 PROBLEM — K21.9 GERD (GASTROESOPHAGEAL REFLUX DISEASE): Status: ACTIVE | Noted: 2023-05-10

## 2023-05-10 PROBLEM — M25.512 LEFT SHOULDER PAIN: Status: ACTIVE | Noted: 2023-05-10

## 2023-05-10 PROBLEM — R92.2 DENSE BREASTS: Status: ACTIVE | Noted: 2023-05-10

## 2023-05-10 PROBLEM — R94.31 ABNORMAL EKG: Status: ACTIVE | Noted: 2023-05-10

## 2023-05-10 PROBLEM — R06.02 SHORTNESS OF BREATH AT REST: Status: ACTIVE | Noted: 2023-05-10

## 2023-05-10 PROBLEM — I10 HYPERTENSION: Status: ACTIVE | Noted: 2023-05-10

## 2023-05-10 PROBLEM — R92.30 DENSE BREASTS: Status: ACTIVE | Noted: 2023-05-10

## 2023-05-10 PROBLEM — J44.9 COPD (CHRONIC OBSTRUCTIVE PULMONARY DISEASE) (MULTI): Status: ACTIVE | Noted: 2023-05-10

## 2023-05-10 PROBLEM — E78.2 HYPERLIPIDEMIA, MIXED: Status: ACTIVE | Noted: 2023-05-10

## 2023-05-10 PROBLEM — R05.9 COUGH: Status: ACTIVE | Noted: 2023-05-10

## 2023-05-10 PROBLEM — E55.9 VITAMIN D DEFICIENCY: Status: ACTIVE | Noted: 2023-05-10

## 2023-05-10 PROBLEM — M81.0 AGE-RELATED OSTEOPOROSIS WITHOUT CURRENT PATHOLOGICAL FRACTURE: Status: ACTIVE | Noted: 2023-05-10

## 2023-05-10 PROBLEM — E28.39 OVARIAN FAILURE: Status: ACTIVE | Noted: 2023-05-10

## 2023-05-10 RX ORDER — ATENOLOL 50 MG/1
1 TABLET ORAL DAILY
COMMUNITY
End: 2023-08-16 | Stop reason: SDUPTHER

## 2023-05-10 RX ORDER — ALBUTEROL SULFATE 90 UG/1
2 AEROSOL, METERED RESPIRATORY (INHALATION) EVERY 4 HOURS PRN
COMMUNITY
Start: 2022-07-29 | End: 2023-08-16 | Stop reason: SDUPTHER

## 2023-05-10 RX ORDER — ZOLEDRONIC ACID 5 MG/100ML
5 INJECTION, SOLUTION INTRAVENOUS ONCE
COMMUNITY
Start: 2022-11-15

## 2023-05-10 RX ORDER — VIT C/E/ZN/COPPR/LUTEIN/ZEAXAN 250MG-90MG
25 CAPSULE ORAL DAILY
COMMUNITY
Start: 2021-03-15

## 2023-05-16 ENCOUNTER — OFFICE VISIT (OUTPATIENT)
Dept: PRIMARY CARE | Facility: CLINIC | Age: 68
End: 2023-05-16
Payer: MEDICARE

## 2023-05-16 VITALS
TEMPERATURE: 97.1 F | SYSTOLIC BLOOD PRESSURE: 139 MMHG | DIASTOLIC BLOOD PRESSURE: 74 MMHG | HEIGHT: 59 IN | HEART RATE: 67 BPM | WEIGHT: 92 LBS | OXYGEN SATURATION: 91 % | BODY MASS INDEX: 18.55 KG/M2

## 2023-05-16 DIAGNOSIS — E55.9 VITAMIN D DEFICIENCY: ICD-10-CM

## 2023-05-16 DIAGNOSIS — M62.838 MUSCLE SPASM: ICD-10-CM

## 2023-05-16 DIAGNOSIS — M81.0 AGE-RELATED OSTEOPOROSIS WITHOUT CURRENT PATHOLOGICAL FRACTURE: ICD-10-CM

## 2023-05-16 DIAGNOSIS — J44.9 CHRONIC OBSTRUCTIVE PULMONARY DISEASE, UNSPECIFIED COPD TYPE (MULTI): ICD-10-CM

## 2023-05-16 DIAGNOSIS — Z12.31 ENCOUNTER FOR SCREENING MAMMOGRAM FOR MALIGNANT NEOPLASM OF BREAST: ICD-10-CM

## 2023-05-16 DIAGNOSIS — I10 PRIMARY HYPERTENSION: Primary | ICD-10-CM

## 2023-05-16 DIAGNOSIS — E78.2 HYPERLIPIDEMIA, MIXED: ICD-10-CM

## 2023-05-16 DIAGNOSIS — K21.9 GASTROESOPHAGEAL REFLUX DISEASE WITHOUT ESOPHAGITIS: ICD-10-CM

## 2023-05-16 PROBLEM — R07.89 ATYPICAL CHEST PAIN: Status: ACTIVE | Noted: 2021-08-03

## 2023-05-16 PROCEDURE — 1160F RVW MEDS BY RX/DR IN RCRD: CPT | Performed by: FAMILY MEDICINE

## 2023-05-16 PROCEDURE — 3078F DIAST BP <80 MM HG: CPT | Performed by: FAMILY MEDICINE

## 2023-05-16 PROCEDURE — 1036F TOBACCO NON-USER: CPT | Performed by: FAMILY MEDICINE

## 2023-05-16 PROCEDURE — 3075F SYST BP GE 130 - 139MM HG: CPT | Performed by: FAMILY MEDICINE

## 2023-05-16 PROCEDURE — 99214 OFFICE O/P EST MOD 30 MIN: CPT | Performed by: FAMILY MEDICINE

## 2023-05-16 PROCEDURE — 1159F MED LIST DOCD IN RCRD: CPT | Performed by: FAMILY MEDICINE

## 2023-05-16 RX ORDER — AMLODIPINE BESYLATE 5 MG/1
1 TABLET ORAL DAILY
COMMUNITY
Start: 2021-09-22 | End: 2023-08-16 | Stop reason: SDUPTHER

## 2023-05-16 RX ORDER — OMEPRAZOLE 20 MG/1
20 CAPSULE, DELAYED RELEASE ORAL DAILY
Qty: 90 CAPSULE | Refills: 0 | Status: SHIPPED | OUTPATIENT
Start: 2023-05-16 | End: 2023-08-16 | Stop reason: SDUPTHER

## 2023-05-16 RX ORDER — OMEPRAZOLE 20 MG/1
20 CAPSULE, DELAYED RELEASE ORAL DAILY
COMMUNITY
End: 2023-05-16 | Stop reason: SDUPTHER

## 2023-05-16 RX ORDER — CEFUROXIME AXETIL 250 MG/1
250 TABLET ORAL EVERY 12 HOURS
COMMUNITY
Start: 2022-08-16 | End: 2023-05-16 | Stop reason: WASHOUT

## 2023-05-16 RX ORDER — LANOLIN ALCOHOL/MO/W.PET/CERES
400 CREAM (GRAM) TOPICAL DAILY
Qty: 90 TABLET | Refills: 0 | Status: SHIPPED | OUTPATIENT
Start: 2023-05-16

## 2023-05-16 RX ORDER — FUROSEMIDE 20 MG/1
1 TABLET ORAL DAILY
COMMUNITY
Start: 2021-08-04 | End: 2023-05-16 | Stop reason: WASHOUT

## 2023-05-16 ASSESSMENT — PATIENT HEALTH QUESTIONNAIRE - PHQ9
2. FEELING DOWN, DEPRESSED OR HOPELESS: NOT AT ALL
1. LITTLE INTEREST OR PLEASURE IN DOING THINGS: NOT AT ALL
SUM OF ALL RESPONSES TO PHQ9 QUESTIONS 1 AND 2: 0

## 2023-05-16 NOTE — PROGRESS NOTES
Subjective   Patient ID: Little De La Cruz is a 67 y.o. female who presents for 3 month follow up for monitoring and management of multiple medical conditions.      HPI   Pt c/o left leg cramping off and on.    She has COPD.  Pt has not seen Dr. Roque (pulmonology) since her LOV.  She has not been taking the Trelegy.  She uses Albuterol as needed.  She did not follow up because she did not have insurance.     She has hypertension.  Patient does not monitor BP at home.   Denies CP, dizziness, and LE edema.   Patient is compliant with antihypertensive therapy and denies any noted side effects.      She has hyperlipidemia.  Patient does not try to limit the amount of fatty foods and high cholesterol foods that he consumes  She is not on any lipid lowering medication at this time and has been treated with lifestyle modification and dietary changes.     She has vitamin D deficiency.  She currently is on a vitamin d supplement. Patient not able to recall the dose of the supplement.      Pt has GERD.  Her GERD is stable with omeprazole.   She denies any breakthrough reflux symptoms.      She has osteoporosis.  Pt is treated with Reclast and has tolerated the medication well.     Review of Systems  Constitutional: Patient denies any fever, chills, loss of appetite, or unexplained weight loss.  Cardiovascular: Patient denies any chest pain, shortness of breath with exertion, tachycardia, palpitations, orthopnea, or paroxysmal nocturnal dyspnea.  Respiratory: Patient denies any cough, shortness of breath, or wheezing.    Gastrointestinal: She denies any nausea, vomiting, diarrhea, melena, or hematochezia.    Musculoskeletal: Patient denies any myalgia, arthralgia, joint swelling, or joint deformity. Pt c/o  left leg pain as noted above.    Skin: Denies any rashes or skin lesions.   Neurology: Patient denies any new motor or sensory losses. Denies any numbness, tingling, weakness, and incoordination of the extremities. Patient  "also denies any tremor, seizures, or gait instability.  Endocrinology: Denies any polyuria, polydipsia, polyphagia, or heat/cold intolerance.     SEE HISTORY OF PRESENT ILLNESS ALSO   ROS IS OTHERWISE NEGATIVE    Objective   /74   Pulse 67   Temp 36.2 °C (97.1 °F)   Ht 1.499 m (4' 11\")   Wt (!) 41.7 kg (92 lb)   SpO2 91%   BMI 18.58 kg/m²     Physical Exam  General Appearance: Alert and cooperative, in no acute distress, well-developed/well-nourished female.  Neck: Supple and without adenopathy or rigidity. There is no JVD at 90° and no carotid bruits are noted. There is no thyromegaly, thyroid tenderness, or palpable thyroid nodules.  Cardiovascular: Regular rate and rhythm without murmur or ectopy.  Respiratory: Clear to auscultation bilaterally with good air exchange. Good respiratory effort no accessory muscle use.    MSK: Examination of the LLE reveals no palpable masses or skin changes. There is mild tenderness along the lateral aspect of the tibia in the left leg.    Skin: Good turgor, moist, warm and without rashes or lesions.  Neurological exam: Alert and oriented x3, no tremor, normal gait.  Extremities: No clubbing, cyanosis, or edema.  Psychiatric: Appropriate mood and affect, good insight and judgment, no delusions or thought disorders, no suicidal or homicidal ideation.     Assessment/Plan     HTN:  Blood pressure appears adequately controlled and we will continue with the current antihypertensive therapy.    Hyperlipidemia: Patient will continue with the current medication. Dietary changes, exercise, and maintenance of healthy weight were discussed at length.  We will continue to monitor labs.      GERD: Stable based on symptoms.  We will continue the omeprazole. Prescription for Omeprazole provided today.     Vitamin D deficiency: LOW ON 6/6/2022 LABS.  Patient advised to take 5,000 units of vitamin D per day.     COPD: Stable based on symptoms.  Pt denies any " exacerbation.    Osteoporosis: Her 2022 DEXA indicated osteoporosis and increased risk for hip fracture.  Pt is being treated with Reclast.  Next Reclast infusion will be due 2023    Muscle spasms LLE:  Pt will take OTC magnesium oxide at a dose of 400 mg once daily to help with her current complaints of spasms    Colon cancer screening: Cologuard previously ordered but not yet completed.  2023 Cologuard still not completed. Pt is unsure if cologuard at home is . Cologuard test to be ordered pending update from patient.  2023: Cologuard still not completed. Pt states kit is good until end of . Advised pt to complete testing.    MAMM DUE 2023 - ordered today 2023  DEXA DUE 2024  RECLAST DUE 2023    By signing my name below, I, Emi Eng Scribe, attest that this documentation has been prepared under the direction and in the presence of Dr. Damian.  All medical record entries made by the Scribe were at my direction and personally dictated by me. I have reviewed the chart and agree that the record accurately reflects my personal performance of the history, physical exam, discussion and plan. (Dr. Damian).

## 2023-05-16 NOTE — PATIENT INSTRUCTIONS
Start taking magnesium to help with your arm and leg pain.    Mammogram ordered today. You may schedule this screening on or after 6/7/2023.    Have Cologuard testing done soon.    Continue the current medications. Follow up in 3 months with labs PRIOR.    It was a pleasure to see you today. Thank you for choosing us for your health care needs.    If you have lab or other testing completed and have not been informed of results within one week, please call the office for your results.    If you haven't done so, consider signing up for [employer], the [facility] personal health record. Ask the staff how you can get started.

## 2023-07-24 ENCOUNTER — TELEPHONE (OUTPATIENT)
Dept: PRIMARY CARE | Facility: CLINIC | Age: 68
End: 2023-07-24
Payer: MEDICARE

## 2023-07-24 NOTE — TELEPHONE ENCOUNTER
Patient is asking for antibiotics sx of phlegm with coughing. Would also like her inhaler filled.

## 2023-07-24 NOTE — TELEPHONE ENCOUNTER
She needs to be evaluated either here or at a walk in urgent care so that someone can listen to her and determine the appropriate treatment for her.

## 2023-07-27 ENCOUNTER — OFFICE VISIT (OUTPATIENT)
Dept: PRIMARY CARE | Facility: CLINIC | Age: 68
End: 2023-07-27
Payer: MEDICARE

## 2023-07-27 VITALS
TEMPERATURE: 96.1 F | OXYGEN SATURATION: 93 % | BODY MASS INDEX: 18.32 KG/M2 | HEIGHT: 59 IN | WEIGHT: 90.9 LBS | HEART RATE: 71 BPM | SYSTOLIC BLOOD PRESSURE: 138 MMHG | DIASTOLIC BLOOD PRESSURE: 78 MMHG

## 2023-07-27 DIAGNOSIS — J44.1 ACUTE EXACERBATION OF CHRONIC OBSTRUCTIVE PULMONARY DISEASE (COPD) (MULTI): Primary | ICD-10-CM

## 2023-07-27 PROCEDURE — 1159F MED LIST DOCD IN RCRD: CPT | Performed by: FAMILY MEDICINE

## 2023-07-27 PROCEDURE — 1036F TOBACCO NON-USER: CPT | Performed by: FAMILY MEDICINE

## 2023-07-27 PROCEDURE — 99214 OFFICE O/P EST MOD 30 MIN: CPT | Performed by: FAMILY MEDICINE

## 2023-07-27 PROCEDURE — 3075F SYST BP GE 130 - 139MM HG: CPT | Performed by: FAMILY MEDICINE

## 2023-07-27 PROCEDURE — 1126F AMNT PAIN NOTED NONE PRSNT: CPT | Performed by: FAMILY MEDICINE

## 2023-07-27 PROCEDURE — 3078F DIAST BP <80 MM HG: CPT | Performed by: FAMILY MEDICINE

## 2023-07-27 PROCEDURE — 1160F RVW MEDS BY RX/DR IN RCRD: CPT | Performed by: FAMILY MEDICINE

## 2023-07-27 RX ORDER — BENZONATATE 200 MG/1
200 CAPSULE ORAL 3 TIMES DAILY PRN
Qty: 21 CAPSULE | Refills: 0 | Status: SHIPPED | OUTPATIENT
Start: 2023-07-27 | End: 2023-08-03

## 2023-07-27 RX ORDER — CEFUROXIME AXETIL 250 MG/1
250 TABLET ORAL 2 TIMES DAILY
Qty: 20 TABLET | Refills: 0 | Status: SHIPPED | OUTPATIENT
Start: 2023-07-27 | End: 2023-08-06

## 2023-07-27 RX ORDER — PREDNISONE 20 MG/1
TABLET ORAL
Qty: 12 TABLET | Refills: 0 | Status: SHIPPED | OUTPATIENT
Start: 2023-07-27 | End: 2023-11-16 | Stop reason: ALTCHOICE

## 2023-07-27 ASSESSMENT — ENCOUNTER SYMPTOMS
FATIGUE: 1
SINUS PRESSURE: 0
SORE THROAT: 0
FEVER: 0
WHEEZING: 1
NAUSEA: 0
COUGH: 1
VOMITING: 0
SHORTNESS OF BREATH: 1

## 2023-07-27 ASSESSMENT — PATIENT HEALTH QUESTIONNAIRE - PHQ9
1. LITTLE INTEREST OR PLEASURE IN DOING THINGS: NOT AT ALL
2. FEELING DOWN, DEPRESSED OR HOPELESS: NOT AT ALL
SUM OF ALL RESPONSES TO PHQ9 QUESTIONS 1 AND 2: 0

## 2023-07-27 NOTE — PROGRESS NOTES
"Subjective   Patient ID: Little De La Cruz is a 67 y.o. female who presents for URI.    HPI       Complains of having a bad cough with phlegm and thinks she may be having a COPD flare up.  Otc cough med has not helped.    Started as \"allergy symptoms\" a few weeks ago.  Has been using her rescue inhaler everyday.      Review of Systems   Constitutional:  Positive for fatigue. Negative for fever.   HENT:  Negative for congestion, ear pain, sinus pressure and sore throat.    Respiratory:  Positive for cough, shortness of breath and wheezing.    Gastrointestinal:  Negative for nausea and vomiting.   No orthopnea or paroxysmal nocturnal dyspnea.  Ext:  No LE edema.        Objective   /78   Pulse 71   Temp 35.6 °C (96.1 °F)   Ht 1.499 m (4' 11\")   Wt (!) 41.2 kg (90 lb 14.4 oz)   SpO2 93%   BMI 18.36 kg/m²     Physical Exam  Constitutional:       General: She is not in acute distress.     Appearance: She is not ill-appearing or diaphoretic.   HENT:      Head: Normocephalic and atraumatic.      Right Ear: Tympanic membrane and ear canal normal.      Left Ear: Tympanic membrane and ear canal normal.      Nose: Nose normal.   Pulmonary:      Breath sounds: Examination of the right-lower field reveals wheezing. Examination of the left-lower field reveals wheezing. Decreased breath sounds and wheezing present. No rhonchi or rales.         Assessment/Plan   1. Acute exacerbation of chronic obstructive pulmonary disease (COPD) (CMS/[facility])  We will start her on Ceftin, Tessalon as needed for the cough, and a tapering course of prednisone.  Patient to follow-up in 4 to 5 days if not markedly improved or sooner should the symptoms worsen.         Signed Prescriptions Disp Refills    cefuroxime (Ceftin) 250 mg tablet 20 tablet 0     Sig: Take 1 tablet (250 mg) by mouth 2 times a day for 10 days.    benzonatate (Tessalon) 200 mg capsule 21 capsule 0     Sig: Take 1 capsule (200 mg) by mouth 3 times a day as needed for cough for " up to 7 days. Do not crush or chew.    predniSONE (Deltasone) 20 mg tablet 12 tablet 0     Sig: Take 3 tablets for 2 days, then 2 tablets for 2 days, then 1 tablet for 2 days

## 2023-07-27 NOTE — PATIENT INSTRUCTIONS
Follow up in 4-5 days if not improved or sooner if worse.    It was a pleasure to see you today. Thank you for choosing us for your health care needs.    If you have lab or other testing completed and have not been informed of results within one week, please call the office for your results.    If you haven't done so, consider signing up for [facility] [employer], the [facility] personal health record. Ask the staff how you can get started.

## 2023-08-16 ENCOUNTER — OFFICE VISIT (OUTPATIENT)
Dept: PRIMARY CARE | Facility: CLINIC | Age: 68
End: 2023-08-16
Payer: MEDICARE

## 2023-08-16 VITALS
BODY MASS INDEX: 18.43 KG/M2 | TEMPERATURE: 97.2 F | OXYGEN SATURATION: 94 % | WEIGHT: 91.4 LBS | DIASTOLIC BLOOD PRESSURE: 72 MMHG | SYSTOLIC BLOOD PRESSURE: 138 MMHG | HEIGHT: 59 IN | HEART RATE: 63 BPM

## 2023-08-16 DIAGNOSIS — J44.9 CHRONIC OBSTRUCTIVE PULMONARY DISEASE, UNSPECIFIED COPD TYPE (MULTI): ICD-10-CM

## 2023-08-16 DIAGNOSIS — K21.9 GASTROESOPHAGEAL REFLUX DISEASE WITHOUT ESOPHAGITIS: ICD-10-CM

## 2023-08-16 DIAGNOSIS — I10 PRIMARY HYPERTENSION: Primary | ICD-10-CM

## 2023-08-16 DIAGNOSIS — M81.0 AGE-RELATED OSTEOPOROSIS WITHOUT CURRENT PATHOLOGICAL FRACTURE: ICD-10-CM

## 2023-08-16 DIAGNOSIS — E55.9 VITAMIN D DEFICIENCY: ICD-10-CM

## 2023-08-16 DIAGNOSIS — E78.2 HYPERLIPIDEMIA, MIXED: ICD-10-CM

## 2023-08-16 PROCEDURE — 3075F SYST BP GE 130 - 139MM HG: CPT | Performed by: FAMILY MEDICINE

## 2023-08-16 PROCEDURE — 3078F DIAST BP <80 MM HG: CPT | Performed by: FAMILY MEDICINE

## 2023-08-16 PROCEDURE — 99214 OFFICE O/P EST MOD 30 MIN: CPT | Performed by: FAMILY MEDICINE

## 2023-08-16 PROCEDURE — 1036F TOBACCO NON-USER: CPT | Performed by: FAMILY MEDICINE

## 2023-08-16 PROCEDURE — 1159F MED LIST DOCD IN RCRD: CPT | Performed by: FAMILY MEDICINE

## 2023-08-16 PROCEDURE — 1160F RVW MEDS BY RX/DR IN RCRD: CPT | Performed by: FAMILY MEDICINE

## 2023-08-16 PROCEDURE — 1126F AMNT PAIN NOTED NONE PRSNT: CPT | Performed by: FAMILY MEDICINE

## 2023-08-16 RX ORDER — AMLODIPINE BESYLATE 5 MG/1
5 TABLET ORAL DAILY
Qty: 90 TABLET | Refills: 0 | Status: SHIPPED | OUTPATIENT
Start: 2023-08-16 | End: 2024-02-20 | Stop reason: SDUPTHER

## 2023-08-16 RX ORDER — OMEPRAZOLE 20 MG/1
20 CAPSULE, DELAYED RELEASE ORAL DAILY
Qty: 90 CAPSULE | Refills: 0 | Status: SHIPPED | OUTPATIENT
Start: 2023-08-16 | End: 2024-02-20 | Stop reason: SDUPTHER

## 2023-08-16 RX ORDER — ALBUTEROL SULFATE 90 UG/1
2 AEROSOL, METERED RESPIRATORY (INHALATION) EVERY 4 HOURS PRN
Qty: 18 G | Refills: 1 | Status: SHIPPED | OUTPATIENT
Start: 2023-08-16 | End: 2024-02-20 | Stop reason: SDUPTHER

## 2023-08-16 RX ORDER — ATENOLOL 50 MG/1
50 TABLET ORAL DAILY
Qty: 90 TABLET | Refills: 0 | Status: SHIPPED | OUTPATIENT
Start: 2023-08-16 | End: 2024-02-20 | Stop reason: SDUPTHER

## 2023-08-16 NOTE — PROGRESS NOTES
Subjective   Patient ID: Little De La Cruz is a 67 y.o. female who presents for Follow-up.    HPI     No new concerns      COLOGUARD TESTING NOT COMPLETED AS OF 2/14/2023  Mammogram: 6/2022  Dexa: 6/2022   Annual labs: 6/2022     She has COPD.  Pt has not seen Dr. Roque (pulmonology) since her LOV.  She has not been taking the Trelegy.  She uses Albuterol as needed.  She did not follow up because she did not have insurance.        She has hypertension.  Patient does not monitor BP at home.   Denies CP, dizziness, and LE edema.   Patient is compliant with antihypertensive therapy and denies any noted side effects.      She has hyperlipidemia.  Patient does not try to limit the amount of fatty foods and high cholesterol foods that he consumes  She is not on any lipid lowering medication at this time and has been treated with lifestyle modification and dietary changes.     She has vitamin D deficiency.  She currently is on a vitamin d supplement. Patient not able to recall the dose of the supplement.      Pt has GERD.  Her GERD is stable with omeprazole.   She denies any breakthrough reflux symptoms.      She has osteoporosis.  2/14/2023: Pt is treated with Reclast and is handling it well.     Review of Systems  Constitutional: Patient denies any fever, chills, loss of appetite, or unexplained weight loss.  Cardiovascular: Patient denies any chest pain, shortness of breath with exertion, tachycardia, palpitations, orthopnea, or paroxysmal nocturnal dyspnea.  Respiratory: Patient denies any cough, shortness breath, or wheezing.  Gastrointestinal: Patient denies any nausea, vomiting, diarrhea, constipation, melena, hematochezia, or reflux symptoms.  Skin: Denies any rashes or skin lesions.   Neurology: Patient denies any new motor or sensory losses.  Denies any numbness, tingling, weakness, and incoordination of the extremities.  Patient also denies any tremor, seizures, or gait instability.  Endocrinology: Denies any  "polyuria, polydipsia, polyphagia, or heat/cold intolerance.    Objective   /72   Pulse 63   Temp 36.2 °C (97.2 °F)   Ht 1.499 m (4' 11\")   Wt (!) 41.5 kg (91 lb 6.4 oz)   SpO2 94%   BMI 18.46 kg/m²     Physical Exam  General Appearance: Alert and cooperative, in no acute distress, well-developed/well-nourished female.  Neck: Supple and without adenopathy or rigidity.  There is no JVD at 90° and no carotid bruits are noted.  There is no thyromegaly, thyroid tenderness, or palpable thyroid nodules.  Heart: Regular rate and rhythm without murmur or ectopy.  Respiratory: Lungs are clear to auscultation bilaterally with good air exchange.  Good respiratory effort and no accessory muscle use.  Skin: Good turgor, moist, warm and without rashes or lesions.  Neurological exam: Alert and oriented ×3, no tremor, normal gait.  Extremities: No clubbing, cyanosis, or edema      Assessment/Plan     HTN:  Blood pressure appears adequately controlled and we will continue with the current antihypertensive therapy.     Hyperlipidemia: Patient will continue with the current medication. Dietary changes, exercise, and maintenance of healthy weight were discussed at length.  We will continue to monitor labs.       GERD: Stable based on symptoms.  We will continue the omeprazole.      Vitamin D deficiency: LOW ON 2022 LABS.  Patient advised to take 5,000 units of vitamin D per day.      COPD: Stable based on symptoms.  Pt denies any exacerbation.     Osteoporosis: Her 2022 DEXA indicated osteoporosis and increased risk for hip fracture.  Pt is being treated with Reclast.  Next Reclast infusion will be due 2023         Colon cancer screening: Cologuard previously ordered but not yet completed.  2023 Cologuard still not completed. Pt is unsure if cologuard at home is . Cologuard test to be ordered pending update from patient.  2023: Cologuard still not completed. Pt states kit is good until end of " 2023. Advised pt to complete testing.  8/16/2023:  Pt has not completed the test yet.       MAMM DUE 6/7/2023 - ordered today 5/16/2023  DEXA DUE 6/7/2024  RECLAST DUE 12/7/2023        Requested Prescriptions     Signed Prescriptions Disp Refills    omeprazole (PriLOSEC) 20 mg DR capsule 90 capsule 0     Sig: Take 1 capsule (20 mg) by mouth once daily.    albuterol 90 mcg/actuation inhaler 18 g 1     Sig: Inhale 2 puffs every 4 hours if needed for wheezing or shortness of breath.    amLODIPine (Norvasc) 5 mg tablet 90 tablet 0     Sig: Take 1 tablet (5 mg) by mouth once daily.    atenolol (Tenormin) 50 mg tablet 90 tablet 0     Sig: Take 1 tablet (50 mg) by mouth once daily.

## 2023-08-16 NOTE — PATIENT INSTRUCTIONS
Please have labs and the mammogram as discussed.  PLEASE COMPLETE THE COLOGUARD KIT YOU HAVE AT HOME.    Follow up in 3 months.    It was a pleasure to see you today. Thank you for choosing us for your health care needs.    If you have lab or other testing completed and have not been informed of results within one week, please call the office for your results.    If you haven't done so, consider signing up for [facility] [employer], the [facility] personal health record. Ask the staff how you can get started.

## 2023-11-15 ENCOUNTER — LAB (OUTPATIENT)
Dept: LAB | Facility: LAB | Age: 68
End: 2023-11-15
Payer: MEDICARE

## 2023-11-15 DIAGNOSIS — E55.9 VITAMIN D DEFICIENCY: ICD-10-CM

## 2023-11-15 DIAGNOSIS — I10 PRIMARY HYPERTENSION: ICD-10-CM

## 2023-11-15 DIAGNOSIS — E78.2 HYPERLIPIDEMIA, MIXED: ICD-10-CM

## 2023-11-15 LAB
25(OH)D3 SERPL-MCNC: 19 NG/ML (ref 30–100)
ALBUMIN SERPL BCP-MCNC: 4.2 G/DL (ref 3.4–5)
ALP SERPL-CCNC: 71 U/L (ref 33–136)
ALT SERPL W P-5'-P-CCNC: 10 U/L (ref 7–45)
ANION GAP SERPL CALC-SCNC: 13 MMOL/L (ref 10–20)
AST SERPL W P-5'-P-CCNC: 21 U/L (ref 9–39)
BILIRUB SERPL-MCNC: 0.3 MG/DL (ref 0–1.2)
BUN SERPL-MCNC: 10 MG/DL (ref 6–23)
CALCIUM SERPL-MCNC: 9 MG/DL (ref 8.6–10.3)
CHLORIDE SERPL-SCNC: 95 MMOL/L (ref 98–107)
CHOLEST SERPL-MCNC: 320 MG/DL (ref 0–199)
CHOLESTEROL/HDL RATIO: 3.8
CO2 SERPL-SCNC: 29 MMOL/L (ref 21–32)
CREAT SERPL-MCNC: 1.01 MG/DL (ref 0.5–1.05)
GFR SERPL CREATININE-BSD FRML MDRD: 61 ML/MIN/1.73M*2
GLUCOSE SERPL-MCNC: 99 MG/DL (ref 74–99)
HDLC SERPL-MCNC: 84.2 MG/DL
LDLC SERPL CALC-MCNC: ABNORMAL MG/DL
NON HDL CHOLESTEROL: 236 MG/DL (ref 0–149)
POTASSIUM SERPL-SCNC: 4.3 MMOL/L (ref 3.5–5.3)
PROT SERPL-MCNC: 7.2 G/DL (ref 6.4–8.2)
SODIUM SERPL-SCNC: 133 MMOL/L (ref 136–145)
TRIGL SERPL-MCNC: 403 MG/DL (ref 0–149)
VLDL: ABNORMAL

## 2023-11-15 PROCEDURE — 82306 VITAMIN D 25 HYDROXY: CPT

## 2023-11-15 PROCEDURE — 36415 COLL VENOUS BLD VENIPUNCTURE: CPT

## 2023-11-15 PROCEDURE — 80053 COMPREHEN METABOLIC PANEL: CPT

## 2023-11-15 PROCEDURE — 80061 LIPID PANEL: CPT

## 2023-11-16 ENCOUNTER — OFFICE VISIT (OUTPATIENT)
Dept: PRIMARY CARE | Facility: CLINIC | Age: 68
End: 2023-11-16
Payer: MEDICARE

## 2023-11-16 ENCOUNTER — APPOINTMENT (OUTPATIENT)
Dept: PRIMARY CARE | Facility: CLINIC | Age: 68
End: 2023-11-16
Payer: MEDICARE

## 2023-11-16 VITALS
OXYGEN SATURATION: 90 % | WEIGHT: 90.7 LBS | SYSTOLIC BLOOD PRESSURE: 134 MMHG | TEMPERATURE: 97.3 F | HEART RATE: 67 BPM | DIASTOLIC BLOOD PRESSURE: 75 MMHG | BODY MASS INDEX: 18.28 KG/M2 | HEIGHT: 59 IN

## 2023-11-16 DIAGNOSIS — E78.2 HYPERLIPIDEMIA, MIXED: ICD-10-CM

## 2023-11-16 DIAGNOSIS — M81.0 AGE-RELATED OSTEOPOROSIS WITHOUT CURRENT PATHOLOGICAL FRACTURE: ICD-10-CM

## 2023-11-16 DIAGNOSIS — I10 PRIMARY HYPERTENSION: Primary | ICD-10-CM

## 2023-11-16 DIAGNOSIS — J44.9 CHRONIC OBSTRUCTIVE PULMONARY DISEASE, UNSPECIFIED COPD TYPE (MULTI): ICD-10-CM

## 2023-11-16 DIAGNOSIS — E55.9 VITAMIN D DEFICIENCY: ICD-10-CM

## 2023-11-16 DIAGNOSIS — M62.838 MUSCLE SPASM: ICD-10-CM

## 2023-11-16 DIAGNOSIS — J01.90 ACUTE NON-RECURRENT SINUSITIS, UNSPECIFIED LOCATION: ICD-10-CM

## 2023-11-16 DIAGNOSIS — K21.9 GASTROESOPHAGEAL REFLUX DISEASE WITHOUT ESOPHAGITIS: ICD-10-CM

## 2023-11-16 PROCEDURE — 99213 OFFICE O/P EST LOW 20 MIN: CPT | Performed by: FAMILY MEDICINE

## 2023-11-16 PROCEDURE — 1159F MED LIST DOCD IN RCRD: CPT | Performed by: FAMILY MEDICINE

## 2023-11-16 PROCEDURE — 1160F RVW MEDS BY RX/DR IN RCRD: CPT | Performed by: FAMILY MEDICINE

## 2023-11-16 PROCEDURE — 1126F AMNT PAIN NOTED NONE PRSNT: CPT | Performed by: FAMILY MEDICINE

## 2023-11-16 PROCEDURE — 3075F SYST BP GE 130 - 139MM HG: CPT | Performed by: FAMILY MEDICINE

## 2023-11-16 PROCEDURE — 1036F TOBACCO NON-USER: CPT | Performed by: FAMILY MEDICINE

## 2023-11-16 PROCEDURE — 3078F DIAST BP <80 MM HG: CPT | Performed by: FAMILY MEDICINE

## 2023-11-16 RX ORDER — CEFUROXIME AXETIL 250 MG/1
250 TABLET ORAL 2 TIMES DAILY
Qty: 20 TABLET | Refills: 0 | Status: SHIPPED | OUTPATIENT
Start: 2023-11-16 | End: 2023-11-26

## 2023-11-16 ASSESSMENT — PATIENT HEALTH QUESTIONNAIRE - PHQ9
SUM OF ALL RESPONSES TO PHQ9 QUESTIONS 1 AND 2: 0
1. LITTLE INTEREST OR PLEASURE IN DOING THINGS: NOT AT ALL
2. FEELING DOWN, DEPRESSED OR HOPELESS: NOT AT ALL

## 2023-11-16 NOTE — PATIENT INSTRUCTIONS
Please do the Cologuard collection at home.      Follow up in 3 months.    It was a pleasure to see you today. Thank you for choosing us for your health care needs.    If you have lab or other testing completed and have not been informed of results within one week, please call the office for your results.    If you haven't done so, consider signing up for [facility] [employer], the [facility] personal health record. Ask the staff how you can get started.

## 2023-11-16 NOTE — PROGRESS NOTES
Subjective   Patient ID: Little De La Cruz is a 68 y.o. female who presents for Follow-up.    HPI       Concerns of having sinus congestion with a cough.  No fever, chills, or SOB above her baseline.      Lab: 11/16/2023      COLOGUARD TESTING NOT COMPLETED AS OF 11/16/2023  Mammogram: 6/2022  Dexa: 6/2022   Annual labs: 6/2022     She has COPD.  Pt has not seen Dr. Roque (pulmonology) since her LOV.  She has not been taking the Trelegy.  She uses Albuterol as needed.  She did not follow up because she did not have insurance.      She has hypertension.  Patient does not monitor BP at home.   Denies CP, dizziness, and LE edema.   Patient is compliant with antihypertensive therapy and denies any noted side effects.      She has hyperlipidemia.  Patient does not try to limit the amount of fatty foods and high cholesterol foods that he consumes  She is not on any lipid lowering medication at this time and has been treated with lifestyle modification and dietary changes.     She has vitamin D deficiency.  She currently is on a vitamin d supplement. Patient not able to recall the dose of the supplement.      Pt has GERD.  Her GERD is stable with omeprazole.   She denies any breakthrough reflux symptoms.      She has osteoporosis.  Pt is treated with Reclast and has tolerated it well.           Review of Systems  Constitutional: Patient denies any fever, chills, loss of appetite, or unexplained weight loss.  Cardiovascular: Patient denies any chest pain, shortness of breath with exertion, tachycardia, palpitations, orthopnea, or paroxysmal nocturnal dyspnea.  Respiratory: Patient denies any cough, shortness breath, or wheezing.  Gastrointestinal: Patient denies any nausea, vomiting, diarrhea, constipation, melena, hematochezia, or reflux symptoms.  Skin: Denies any rashes or skin lesions.   Neurology: Patient denies any new motor or sensory losses.  Denies any numbness, tingling, weakness, and incoordination of the  "extremities.  Patient also denies any tremor, seizures, or gait instability.  Endocrinology: Denies any polyuria, polydipsia, polyphagia, or heat/cold intolerance.    ENT:  She his having sinus congestion and pressure.  Denies any sore throat or ear pain.    Objective   /75   Pulse 67   Temp 36.3 °C (97.3 °F)   Ht 1.499 m (4' 11\")   Wt (!) 41.1 kg (90 lb 11.2 oz)   SpO2 90%   BMI 18.32 kg/m²     Physical Exam  General Appearance: Alert and cooperative, in no acute distress, well-developed/well-nourished.  Neck: Supple and without adenopathy or rigidity.  There is no JVD at 90° and no carotid bruits are noted.  There is no thyromegaly, thyroid tenderness, or palpable thyroid nodules.  Heart: Regular rate and rhythm without murmur or ectopy.  Respiratory: Lungs are clear to auscultation bilaterally with good air exchange.  Good respiratory effort and no accessory muscle use.  Skin: Good turgor, moist, warm and without rashes or lesions.  Neurological exam: Alert and oriented ×3, no tremor, normal gait.  Extremities: No clubbing, cyanosis, or edema    EENT:  Mucous membranes are moist, external auditory canals and tympanic membranes are within normal limits bilaterally, pharynx is unremarkable.  There is thick yellow nasal drainage noted bilaterally.      Assessment/Plan     1. Primary hypertension  Blood pressure appears adequately controlled and we will continue with the current antihypertensive therapy.    2. Hyperlipidemia, mixed  Hyperlipidemia: Patient will continue with the current conservative care.    Dietary changes, exercise, and maintenance of healthy weight were discussed at length.    3. Gastroesophageal reflux disease without esophagitis  Stable with the current treatment plan.  No breakthrough reflux symptoms.    4. Muscle spasm  Stable with over-the-counter magnesium oxide.  Continue the current treatment plan.    5. Vitamin D deficiency  Stable based on labs.  We will continue the current " vitamin D supplementation.    6. Chronic obstructive pulmonary disease, unspecified COPD type (CMS/HCC)  Stable based on symptoms.  Continue current treatment.    7. Age-related osteoporosis without current pathological fracture  Has tolerated Reclast well.  Reclast infusion will be due and was ordered 11/16/2023.  - zoledronic acid (Reclast) 5 mg/100 mL piggyback; Infuse 100 mL (5 mg) at 400 mL/hr over 15 minutes into a venous catheter 1 time for 1 dose.  Dispense: 100 mL; Refill: 0    8. Acute non-recurrent sinusitis, unspecified location  We will start her on a 10-day course of Ceftin and have her follow-up if not improving.  - cefuroxime (Ceftin) 250 mg tablet; Take 1 tablet (250 mg) by mouth 2 times a day for 10 days.  Dispense: 20 tablet; Refill: 0        MCAW DUE 1/2024  MAMM OVERDUE  DEXA DUE 6/7/2024  COLOGUARD / COLONOSCOPY OVERDUE      Requested Prescriptions     Signed Prescriptions Disp Refills    cefuroxime (Ceftin) 250 mg tablet 20 tablet 0     Sig: Take 1 tablet (250 mg) by mouth 2 times a day for 10 days.    zoledronic acid (Reclast) 5 mg/100 mL piggyback 100 mL 0     Sig: Infuse 100 mL (5 mg) at 400 mL/hr over 15 minutes into a venous catheter 1 time for 1 dose.

## 2023-12-11 RX ORDER — ZOLEDRONIC ACID 5 MG/100ML
5 INJECTION, SOLUTION INTRAVENOUS ONCE
Qty: 100 ML | Refills: 0 | OUTPATIENT
Start: 2023-12-11 | End: 2023-12-11

## 2023-12-18 ENCOUNTER — TELEPHONE (OUTPATIENT)
Dept: PRIMARY CARE | Facility: CLINIC | Age: 68
End: 2023-12-18
Payer: MEDICARE

## 2023-12-18 NOTE — TELEPHONE ENCOUNTER
Pt states she went to the  and was diagnosed with pneumonia, she is calling requesting a follow up. Pt says her cough is worse today than it had been. Pt also reports they told her her oxygen level was low. Due to availability please advised.

## 2023-12-19 NOTE — TELEPHONE ENCOUNTER
Advise pt that we have an opening on Thursday at 4:30 that we can schedule her to be seen.  Schedule is unfortunately booked given the upcoming holiday.

## 2023-12-21 ENCOUNTER — OFFICE VISIT (OUTPATIENT)
Dept: PRIMARY CARE | Facility: CLINIC | Age: 68
End: 2023-12-21
Payer: MEDICARE

## 2023-12-21 VITALS
HEART RATE: 70 BPM | BODY MASS INDEX: 18.75 KG/M2 | WEIGHT: 93 LBS | HEIGHT: 59 IN | TEMPERATURE: 97.8 F | DIASTOLIC BLOOD PRESSURE: 77 MMHG | SYSTOLIC BLOOD PRESSURE: 125 MMHG | OXYGEN SATURATION: 91 %

## 2023-12-21 DIAGNOSIS — J18.9 PNEUMONIA OF RIGHT LOWER LOBE DUE TO INFECTIOUS ORGANISM: Primary | ICD-10-CM

## 2023-12-21 DIAGNOSIS — G89.29 CHRONIC PAIN OF RIGHT KNEE: ICD-10-CM

## 2023-12-21 DIAGNOSIS — M25.561 CHRONIC PAIN OF RIGHT KNEE: ICD-10-CM

## 2023-12-21 DIAGNOSIS — J44.9 CHRONIC OBSTRUCTIVE PULMONARY DISEASE, UNSPECIFIED COPD TYPE (MULTI): ICD-10-CM

## 2023-12-21 PROCEDURE — 1126F AMNT PAIN NOTED NONE PRSNT: CPT | Performed by: FAMILY MEDICINE

## 2023-12-21 PROCEDURE — 99213 OFFICE O/P EST LOW 20 MIN: CPT | Performed by: FAMILY MEDICINE

## 2023-12-21 PROCEDURE — 1160F RVW MEDS BY RX/DR IN RCRD: CPT | Performed by: FAMILY MEDICINE

## 2023-12-21 PROCEDURE — 1159F MED LIST DOCD IN RCRD: CPT | Performed by: FAMILY MEDICINE

## 2023-12-21 PROCEDURE — 3078F DIAST BP <80 MM HG: CPT | Performed by: FAMILY MEDICINE

## 2023-12-21 PROCEDURE — 1036F TOBACCO NON-USER: CPT | Performed by: FAMILY MEDICINE

## 2023-12-21 PROCEDURE — 3074F SYST BP LT 130 MM HG: CPT | Performed by: FAMILY MEDICINE

## 2023-12-21 RX ORDER — ALBUTEROL SULFATE 0.83 MG/ML
2.5 SOLUTION RESPIRATORY (INHALATION) 4 TIMES DAILY PRN
Qty: 150 ML | Refills: 1 | Status: SHIPPED | OUTPATIENT
Start: 2023-12-21 | End: 2024-12-20

## 2023-12-21 RX ORDER — BROMPHENIRAMINE MALEATE, PSEUDOEPHEDRINE HYDROCHLORIDE, AND DEXTROMETHORPHAN HYDROBROMIDE 2; 30; 10 MG/5ML; MG/5ML; MG/5ML
SYRUP ORAL
COMMUNITY
Start: 2023-12-16

## 2023-12-21 RX ORDER — NEBULIZER ACCESSORIES
1 KIT MISCELLANEOUS EVERY 6 HOURS PRN
Qty: 1 KIT | Refills: 1 | Status: SHIPPED | OUTPATIENT
Start: 2023-12-21

## 2023-12-21 RX ORDER — NEBULIZER AND COMPRESSOR
1 EACH MISCELLANEOUS EVERY 6 HOURS PRN
Qty: 1 EACH | Refills: 0 | Status: SHIPPED | OUTPATIENT
Start: 2023-12-21

## 2023-12-21 RX ORDER — AMOXICILLIN AND CLAVULANATE POTASSIUM 875; 125 MG/1; MG/1
1 TABLET, FILM COATED ORAL 2 TIMES DAILY
COMMUNITY
Start: 2023-12-16 | End: 2023-12-23

## 2023-12-21 NOTE — PROGRESS NOTES
"Subjective   Patient ID: Little De La Cruz is a 68 y.o. female who presents for Pneumonia.    HPI       Patient went to the [facility] for SOB and coughing.   Was diagnosed with pneumonia on 12/16/2023 was prescribed Doxycycline, Augmentin and an oral steroid.   She is feeling better but not fully recovered yet.  Still with some cough.  SOB is improved.  No fever or chills.    Steroid does make her fatigued due to lack of sleep.    She has had some ongoing right knee pain.  Feels as if it has \"popped out\" on several occasions when she flexed and externally rotated the hip with the knee flexed.  No deformity is noted.  She has to stand up and can eventually straighten the knee until it pops and then she can move normally again.    Review of Systems  Constitutional: Patient denies any fever, chills, loss of appetite, or unexplained weight loss.  Cardiovascular: Patient denies any chest pain, shortness of breath with exertion, tachycardia, palpitations, orthopnea, or paroxysmal nocturnal dyspnea.    Respiratory: Has some cough as noted.  SOB is improving.    Gastrointestinal: Patient denies any nausea, vomiting, diarrhea, constipation, melena, hematochezia, or reflux symptoms.  Skin: Denies any rashes or skin lesions.   Neurology: Patient denies any new motor or sensory losses.  Denies any numbness, tingling, weakness, and incoordination of the extremities.  Patient also denies any tremor, seizures, or gait instability.  Endocrinology: Denies any polyuria, polydipsia, polyphagia, or heat/cold intolerance.      Objective   /77   Pulse 70   Temp 36.6 °C (97.8 °F)   Ht 1.499 m (4' 11\")   Wt (!) 42.2 kg (93 lb)   SpO2 91%   BMI 18.78 kg/m²     Physical Exam  General Appearance: Alert and cooperative, in no acute distress, thin female.    Neck: Supple and without adenopathy or rigidity.  There is no JVD at 90° and no carotid bruits are noted.  There is no thyromegaly, thyroid tenderness, or palpable " thyroid nodules.  Heart: Regular rate and rhythm without murmur or ectopy.    Respiratory: Occasional cough noted.  Lungs are clear to auscultation bilaterally with moderate air exchange.  Good respiratory effort and no accessory muscle use.    Skin: Good turgor, moist, warm and without rashes or lesions.  Neurological exam: Alert and oriented ×3, no tremor, normal gait.  Extremities: No clubbing, cyanosis, or edema    MS: Examination of the right knee reveals no effusion.  There is mild joint line tenderness.  No noted ligamentous laxity.      Assessment/Plan   1. Pneumonia of right lower lobe due to infectious organism  She will continue on the current medication.  Chest x-ray ordered for reevaluation.  - XR chest 2 views; Future    2. Chronic obstructive pulmonary disease, unspecified COPD type (CMS/HCC)  We will order her a nebulizer, nebulizer tubing kit, and albuterol solution to be used as needed.  - nebulizer accessories (Reusable Nebulizer Kit) kit; 1 kit every 6 hours if needed (sob or wheezine).  Dispense: 1 kit; Refill: 1  - nebulizer and compressor device; 1 Device every 6 hours if needed (sob or wheezint).  Dispense: 1 each; Refill: 0  - albuterol 2.5 mg /3 mL (0.083 %) nebulizer solution; Take 3 mL (2.5 mg) by nebulization 4 times a day as needed for wheezing or shortness of breath.  Dispense: 150 mL; Refill: 1    3. Chronic pain of right knee  We will refer her to orthopedics for further evaluation of her recurrent symptoms.  - Referral to Orthopaedic Surgery; Future

## 2023-12-21 NOTE — PATIENT INSTRUCTIONS
Have chest x-ray on Saturday.    Finish the medications from the Urgent Care.    Referred to orthopedics for the knee.    Get the nebulizer and medication.  You can use every 4-6 hours as needed.    It was a pleasure to see you today. Thank you for choosing us for your health care needs.    If you have lab or other testing completed and have not been informed of results within one week, please call the office for your results.    If you haven't done so, consider signing up for [facility] [employer], the [facility] personal health record. Ask the staff how you can get started.

## 2023-12-23 ENCOUNTER — ANCILLARY PROCEDURE (OUTPATIENT)
Dept: RADIOLOGY | Facility: CLINIC | Age: 68
End: 2023-12-23
Payer: MEDICARE

## 2023-12-23 DIAGNOSIS — J18.9 PNEUMONIA OF RIGHT LOWER LOBE DUE TO INFECTIOUS ORGANISM: ICD-10-CM

## 2023-12-23 PROCEDURE — 71046 X-RAY EXAM CHEST 2 VIEWS: CPT

## 2023-12-23 PROCEDURE — 71046 X-RAY EXAM CHEST 2 VIEWS: CPT | Performed by: RADIOLOGY

## 2023-12-27 ENCOUNTER — APPOINTMENT (OUTPATIENT)
Dept: ORTHOPEDIC SURGERY | Facility: CLINIC | Age: 68
End: 2023-12-27
Payer: MEDICARE

## 2024-01-04 ENCOUNTER — TELEPHONE (OUTPATIENT)
Dept: PRIMARY CARE | Facility: CLINIC | Age: 69
End: 2024-01-04
Payer: MEDICARE

## 2024-01-04 NOTE — TELEPHONE ENCOUNTER
----- Message from Alber Damian DO sent at 1/4/2024  3:30 PM EST -----  Just wanted to make sure she knew her chest x-ray was normal.

## 2024-02-20 ENCOUNTER — OFFICE VISIT (OUTPATIENT)
Dept: PRIMARY CARE | Facility: CLINIC | Age: 69
End: 2024-02-20
Payer: MEDICARE

## 2024-02-20 VITALS
TEMPERATURE: 97.8 F | WEIGHT: 92.8 LBS | HEIGHT: 59 IN | SYSTOLIC BLOOD PRESSURE: 138 MMHG | OXYGEN SATURATION: 92 % | BODY MASS INDEX: 18.71 KG/M2 | HEART RATE: 91 BPM | DIASTOLIC BLOOD PRESSURE: 86 MMHG

## 2024-02-20 DIAGNOSIS — K21.9 GASTROESOPHAGEAL REFLUX DISEASE WITHOUT ESOPHAGITIS: ICD-10-CM

## 2024-02-20 DIAGNOSIS — E55.9 VITAMIN D DEFICIENCY: ICD-10-CM

## 2024-02-20 DIAGNOSIS — Z00.00 WELL ADULT EXAM: ICD-10-CM

## 2024-02-20 DIAGNOSIS — J44.9 CHRONIC OBSTRUCTIVE PULMONARY DISEASE, UNSPECIFIED COPD TYPE (MULTI): ICD-10-CM

## 2024-02-20 DIAGNOSIS — E78.2 HYPERLIPIDEMIA, MIXED: ICD-10-CM

## 2024-02-20 DIAGNOSIS — I10 PRIMARY HYPERTENSION: ICD-10-CM

## 2024-02-20 DIAGNOSIS — Z12.11 ENCOUNTER FOR SCREENING FOR MALIGNANT NEOPLASM OF COLON: ICD-10-CM

## 2024-02-20 DIAGNOSIS — M81.0 AGE-RELATED OSTEOPOROSIS WITHOUT CURRENT PATHOLOGICAL FRACTURE: ICD-10-CM

## 2024-02-20 DIAGNOSIS — M62.838 MUSCLE SPASM: ICD-10-CM

## 2024-02-20 DIAGNOSIS — Z00.00 MEDICARE ANNUAL WELLNESS VISIT, SUBSEQUENT: Primary | ICD-10-CM

## 2024-02-20 DIAGNOSIS — Z12.31 ENCOUNTER FOR SCREENING MAMMOGRAM FOR MALIGNANT NEOPLASM OF BREAST: ICD-10-CM

## 2024-02-20 PROCEDURE — 99397 PER PM REEVAL EST PAT 65+ YR: CPT | Performed by: FAMILY MEDICINE

## 2024-02-20 PROCEDURE — 1170F FXNL STATUS ASSESSED: CPT | Performed by: FAMILY MEDICINE

## 2024-02-20 PROCEDURE — 1160F RVW MEDS BY RX/DR IN RCRD: CPT | Performed by: FAMILY MEDICINE

## 2024-02-20 PROCEDURE — 1036F TOBACCO NON-USER: CPT | Performed by: FAMILY MEDICINE

## 2024-02-20 PROCEDURE — 3075F SYST BP GE 130 - 139MM HG: CPT | Performed by: FAMILY MEDICINE

## 2024-02-20 PROCEDURE — 1159F MED LIST DOCD IN RCRD: CPT | Performed by: FAMILY MEDICINE

## 2024-02-20 PROCEDURE — 3079F DIAST BP 80-89 MM HG: CPT | Performed by: FAMILY MEDICINE

## 2024-02-20 PROCEDURE — G0439 PPPS, SUBSEQ VISIT: HCPCS | Performed by: FAMILY MEDICINE

## 2024-02-20 PROCEDURE — G0446 INTENS BEHAVE THER CARDIO DX: HCPCS | Performed by: FAMILY MEDICINE

## 2024-02-20 PROCEDURE — 99214 OFFICE O/P EST MOD 30 MIN: CPT | Performed by: FAMILY MEDICINE

## 2024-02-20 RX ORDER — EPINEPHRINE 0.3 MG/.3ML
0.3 INJECTION SUBCUTANEOUS EVERY 5 MIN PRN
Status: CANCELLED | OUTPATIENT
Start: 2024-02-20

## 2024-02-20 RX ORDER — DIPHENHYDRAMINE HYDROCHLORIDE 50 MG/ML
50 INJECTION INTRAMUSCULAR; INTRAVENOUS AS NEEDED
Status: CANCELLED | OUTPATIENT
Start: 2024-02-20

## 2024-02-20 RX ORDER — ZOLEDRONIC ACID 5 MG/100ML
5 INJECTION, SOLUTION INTRAVENOUS ONCE
Status: CANCELLED | OUTPATIENT
Start: 2024-02-20

## 2024-02-20 RX ORDER — ATENOLOL 50 MG/1
50 TABLET ORAL DAILY
Qty: 90 TABLET | Refills: 0 | Status: SHIPPED | OUTPATIENT
Start: 2024-02-20

## 2024-02-20 RX ORDER — FAMOTIDINE 10 MG/ML
20 INJECTION INTRAVENOUS ONCE AS NEEDED
Status: CANCELLED | OUTPATIENT
Start: 2024-02-20

## 2024-02-20 RX ORDER — OMEPRAZOLE 20 MG/1
20 CAPSULE, DELAYED RELEASE ORAL DAILY
Qty: 90 CAPSULE | Refills: 0 | Status: SHIPPED | OUTPATIENT
Start: 2024-02-20

## 2024-02-20 RX ORDER — ALBUTEROL SULFATE 90 UG/1
2 AEROSOL, METERED RESPIRATORY (INHALATION) EVERY 4 HOURS PRN
Qty: 18 G | Refills: 1 | Status: SHIPPED | OUTPATIENT
Start: 2024-02-20

## 2024-02-20 RX ORDER — ALBUTEROL SULFATE 0.83 MG/ML
3 SOLUTION RESPIRATORY (INHALATION) AS NEEDED
Status: CANCELLED | OUTPATIENT
Start: 2024-02-20

## 2024-02-20 RX ORDER — AMLODIPINE BESYLATE 5 MG/1
5 TABLET ORAL DAILY
Qty: 90 TABLET | Refills: 0 | Status: SHIPPED | OUTPATIENT
Start: 2024-02-20

## 2024-02-20 ASSESSMENT — ACTIVITIES OF DAILY LIVING (ADL)
MANAGING_FINANCES: INDEPENDENT
DOING_HOUSEWORK: INDEPENDENT
GROCERY_SHOPPING: INDEPENDENT
BATHING: INDEPENDENT
TAKING_MEDICATION: INDEPENDENT
DRESSING: INDEPENDENT

## 2024-02-20 ASSESSMENT — PATIENT HEALTH QUESTIONNAIRE - PHQ9
2. FEELING DOWN, DEPRESSED OR HOPELESS: NOT AT ALL
SUM OF ALL RESPONSES TO PHQ9 QUESTIONS 1 AND 2: 0
1. LITTLE INTEREST OR PLEASURE IN DOING THINGS: NOT AT ALL

## 2024-02-20 NOTE — PROGRESS NOTES
Subjective   Reason for Visit: Little De La Cruz is an 68 y.o. female here for a  Medicare Wellness visit, annual physical, and 3 month follow up monitoring and management of multiple medical conditions.      Past Medical, Surgical, and Family History reviewed and updated in chart.         HPI      No new concerns   No recent blood work       COLOGUARD TESTING NOT COMPLETED AS OF 2/20/2024.  Mammogram: 5/2023  Dexa: 6/2022        She has COPD.  She has not been taking the Trelegy.  She uses Albuterol as needed.  Denies any exacerbations.  Has followed with Dr. Roque ([facility]) as well.      She has hypertension.  Patient does not monitor BP at home.   Denies CP, dizziness, and LE edema.   Patient is compliant with antihypertensive therapy and denies any noted side effects.      She has hyperlipidemia.  Patient does not try to limit the amount of fatty foods and high cholesterol foods that he consumes  She is not on any lipid lowering medication at this time and has been treated with lifestyle modification and dietary changes.     She has vitamin D deficiency.  She currently is on a vitamin d supplement. Patient not able to recall the dose of the supplement.      Pt has GERD.  Her GERD is stable with omeprazole.   She denies any breakthrough reflux symptoms.      She has osteoporosis.  Pt is treated with Reclast and has tolerated it well.              Patient Self Assessment of Health Status  Patient Self Assessment: Fair    Nutrition and Exercise  Current Diet: Unhealthy Diet  Adequate Fluid Intake: Yes  Caffeine: Yes  Exercise Frequency: No Exercise    Functional Ability/Level of Safety  Cognitive Impairment Observed: No cognitive impairment observed    Home Safety Risk Factors: None    Patient Care Team:  Alber Damian DO as PCP - General  Alber Damian DO as PCP - [employer] PCP     Review of Systems  Constitutional: Patient denies any fever, chills, loss of appetite, or unexplained weight  "loss.  Cardiovascular: Patient denies any chest pain, shortness of breath with exertion, tachycardia, palpitations, orthopnea, or paroxysmal nocturnal dyspnea.  Respiratory: Patient denies any cough, shortness breath above her baseline, or wheezing.  Gastrointestinal: Patient denies any nausea, vomiting, diarrhea, constipation, melena, hematochezia, or reflux symptoms.  Skin: Denies any rashes or skin lesions.   Neurology: Patient denies any new motor or sensory losses.  Denies any numbness, tingling, weakness, and incoordination of the extremities.  Patient also denies any tremor, seizures, or gait instability.  Endocrinology: Denies any polyuria, polydipsia, polyphagia, or heat/cold intolerance.    ROS is negative unless otherwise noted.    Objective   Vitals:  /86 Comment: Forgot medications today  Pulse 91   Temp 36.6 °C (97.8 °F)   Ht 1.499 m (4' 11\")   Wt (!) 42.1 kg (92 lb 12.8 oz)   SpO2 92%   BMI 18.74 kg/m²       Physical Exam  General Appearance: Alert and cooperative, in no acute distress, well-developed/well-nourished.  Neck: Supple and without adenopathy or rigidity.  There is no JVD at 90° and no carotid bruits are noted.  There is no thyromegaly, thyroid tenderness, or palpable thyroid nodules.  Heart: Regular rate and rhythm without murmur or ectopy.    Respiratory: Lungs are clear to auscultation bilaterally with moderate air exchange.  Good respiratory effort and no accessory muscle use.    Skin: Good turgor, moist, warm and without rashes or lesions.  Neurological exam: Alert and oriented ×3, no tremor, normal gait.  Extremities: No clubbing, cyanosis, or edema  Head: Normocephalic and atraumatic  Eyes: Conjunctiva and sclera appear normal bilaterally.  Anterior chambers appear clear.  Extraocular muscles are intact.  ENT: Mucous membranes are moist, external auditory canals and tympanic membranes are within normal limits bilaterally.  Pharynx is without erythema or exudate.  There is " no noted rhinorrhea.  Lymph:  No noted cervical, clavicular, axillary, or inguinal adenopathy.  Abdomen: Soft, nontender/nondistended.  No masses, guarding, rebound, or rigidity.  Bowel sounds are normal.  No abdominal bruits.  No CVA tenderness.  There is no widening of the aortic pulsation.  Musculoskeletal: No noted joint effusions or gross bony deformity.      Assessment/Plan     MEDICARE ANNUAL WELLNESS EXAM / ANNUAL PHYSICAL: Appropriate screenings for the patient's current age were discussed at length.  I encouraged a low-fat/low-cholesterol diet and routine exercise.        Primary hypertension  Blood pressure appears adequately controlled and we will continue with the current antihypertensive therapy.     Hyperlipidemia, mixed  Hyperlipidemia: Patient will continue with the current conservative care.    Dietary changes, exercise, and maintenance of healthy weight were discussed at length.    CARDIOVASCULAR INTENSIVE BEHAVIORAL THERAPY:  I discussed face-to-face with this individual discussing the cardiovascular risk and behavioral therapies of nutritional choices, exercise, and elimination of habits contributing to risk.  We agreed on a plan of how they may be able to reduce the current cardiovascular risk.  For patients with risk calculation greater than 10%, aspirin was discussed and encouraged unless known allergy or increased risk of bleeding contraindicates use.    Patient's 10 year CV risk estimate calculates:    The 10-year ASCVD risk score (Quinn ROLON, et al., 2019) is: 12.5%    Values used to calculate the score:      Age: 68 years      Sex: Female      Is Non- : No      Diabetic: No      Tobacco smoker: No      Systolic Blood Pressure: 138 mmHg      Is BP treated: Yes      HDL Cholesterol: 84.2 mg/dL      Total Cholesterol: 320 mg/dL  STATIN THERAPY DECLINED         Gastroesophageal reflux disease without esophagitis  Stable with the current treatment plan.  No breakthrough  reflux symptoms.    Chronic obstructive pulmonary disease, unspecified COPD type  Stable based on symptoms.  Continue current treatment.     Muscle spasm  Stable with over-the-counter magnesium oxide.  Continue the current treatment plan.     Vitamin D deficiency  Stable based on labs.  We will continue the current vitamin D supplementation.     Age-related osteoporosis without current pathological fracture  Has tolerated Reclast well.  Reclast infusion will be due and was ordered.      Screening for colon cancer:  Cologuard was ordered    Screening for breast cancer:  Bilateral screening mammogram was ordered     MCAW DUE 2/2025  DEXA DUE 6/7/2024  MAMM OVERDUE (ordered 2/20/2024)  COLOGUARD / COLONOSCOPY OVERDUE (cologuard ordered 2/20/2024)         Orders Placed This Encounter   Procedures    BI mammo bilateral screening tomosynthesis    Comprehensive Metabolic Panel    Lipid Panel    Vitamin D 25-Hydroxy,Total (for eval of Vitamin D levels)    Cologuard® colon cancer screening     Requested Prescriptions     Signed Prescriptions Disp Refills    albuterol 90 mcg/actuation inhaler 18 g 1     Sig: Inhale 2 puffs every 4 hours if needed for wheezing or shortness of breath.    omeprazole (PriLOSEC) 20 mg DR capsule 90 capsule 0     Sig: Take 1 capsule (20 mg) by mouth once daily.    amLODIPine (Norvasc) 5 mg tablet 90 tablet 0     Sig: Take 1 tablet (5 mg) by mouth once daily.    atenolol (Tenormin) 50 mg tablet 90 tablet 0     Sig: Take 1 tablet (50 mg) by mouth once daily.

## 2024-02-27 ENCOUNTER — TELEPHONE (OUTPATIENT)
Dept: PRIMARY CARE | Facility: CLINIC | Age: 69
End: 2024-02-27
Payer: MEDICARE

## 2024-02-27 NOTE — TELEPHONE ENCOUNTER
I spoke to Little today, 2/27/24. She was unaware that she needed labs prior to scheduling Reclast. She plans to have labs done on 3/5/24.

## 2024-03-05 ENCOUNTER — LAB (OUTPATIENT)
Dept: LAB | Facility: LAB | Age: 69
End: 2024-03-05
Payer: MEDICARE

## 2024-03-05 ENCOUNTER — HOSPITAL ENCOUNTER (OUTPATIENT)
Dept: RADIOLOGY | Facility: CLINIC | Age: 69
Discharge: HOME | End: 2024-03-05
Payer: MEDICARE

## 2024-03-05 DIAGNOSIS — Z12.31 ENCOUNTER FOR SCREENING MAMMOGRAM FOR MALIGNANT NEOPLASM OF BREAST: ICD-10-CM

## 2024-03-05 DIAGNOSIS — M81.0 AGE-RELATED OSTEOPOROSIS WITHOUT CURRENT PATHOLOGICAL FRACTURE: ICD-10-CM

## 2024-03-05 LAB
ALBUMIN SERPL BCP-MCNC: 4.2 G/DL (ref 3.4–5)
ALP SERPL-CCNC: 77 U/L (ref 33–136)
ALT SERPL W P-5'-P-CCNC: 9 U/L (ref 7–45)
ANION GAP SERPL CALC-SCNC: 12 MMOL/L (ref 10–20)
AST SERPL W P-5'-P-CCNC: 15 U/L (ref 9–39)
BILIRUB SERPL-MCNC: 0.4 MG/DL (ref 0–1.2)
BUN SERPL-MCNC: 10 MG/DL (ref 6–23)
CALCIUM SERPL-MCNC: 9.2 MG/DL (ref 8.6–10.3)
CHLORIDE SERPL-SCNC: 101 MMOL/L (ref 98–107)
CO2 SERPL-SCNC: 31 MMOL/L (ref 21–32)
CREAT SERPL-MCNC: 0.87 MG/DL (ref 0.5–1.05)
EGFRCR SERPLBLD CKD-EPI 2021: 73 ML/MIN/1.73M*2
GLUCOSE SERPL-MCNC: 110 MG/DL (ref 74–99)
POTASSIUM SERPL-SCNC: 4.4 MMOL/L (ref 3.5–5.3)
PROT SERPL-MCNC: 7 G/DL (ref 6.4–8.2)
SODIUM SERPL-SCNC: 140 MMOL/L (ref 136–145)

## 2024-03-05 PROCEDURE — 80053 COMPREHEN METABOLIC PANEL: CPT

## 2024-03-05 PROCEDURE — 36415 COLL VENOUS BLD VENIPUNCTURE: CPT

## 2024-03-05 PROCEDURE — 77067 SCR MAMMO BI INCL CAD: CPT | Performed by: RADIOLOGY

## 2024-03-05 PROCEDURE — 77067 SCR MAMMO BI INCL CAD: CPT

## 2024-03-05 PROCEDURE — 77063 BREAST TOMOSYNTHESIS BI: CPT | Performed by: RADIOLOGY

## 2024-03-13 ENCOUNTER — TELEPHONE (OUTPATIENT)
Dept: PRIMARY CARE | Facility: CLINIC | Age: 69
End: 2024-03-13
Payer: MEDICARE

## 2024-03-13 NOTE — TELEPHONE ENCOUNTER
I have left a few messages for Little to schedule Reclast. She has not returned calls.    4/12/24 update - Patient has not returned calls to schedule Prolia. Messages left on vm.

## 2024-03-29 ENCOUNTER — CLINICAL SUPPORT (OUTPATIENT)
Dept: ORTHOPEDIC SURGERY | Facility: CLINIC | Age: 69
End: 2024-03-29
Payer: MEDICARE

## 2024-03-29 ENCOUNTER — OFFICE VISIT (OUTPATIENT)
Dept: ORTHOPEDIC SURGERY | Facility: CLINIC | Age: 69
End: 2024-03-29
Payer: MEDICARE

## 2024-03-29 DIAGNOSIS — G89.29 CHRONIC PAIN OF RIGHT KNEE: ICD-10-CM

## 2024-03-29 DIAGNOSIS — M25.511 RIGHT SHOULDER PAIN, UNSPECIFIED CHRONICITY: ICD-10-CM

## 2024-03-29 DIAGNOSIS — M25.561 CHRONIC PAIN OF RIGHT KNEE: ICD-10-CM

## 2024-03-29 PROCEDURE — 99204 OFFICE O/P NEW MOD 45 MIN: CPT | Performed by: STUDENT IN AN ORGANIZED HEALTH CARE EDUCATION/TRAINING PROGRAM

## 2024-03-29 PROCEDURE — 73564 X-RAY EXAM KNEE 4 OR MORE: CPT | Mod: RIGHT SIDE | Performed by: STUDENT IN AN ORGANIZED HEALTH CARE EDUCATION/TRAINING PROGRAM

## 2024-03-29 PROCEDURE — 1036F TOBACCO NON-USER: CPT | Performed by: STUDENT IN AN ORGANIZED HEALTH CARE EDUCATION/TRAINING PROGRAM

## 2024-03-29 PROCEDURE — 20610 DRAIN/INJ JOINT/BURSA W/O US: CPT | Performed by: STUDENT IN AN ORGANIZED HEALTH CARE EDUCATION/TRAINING PROGRAM

## 2024-03-29 PROCEDURE — 1160F RVW MEDS BY RX/DR IN RCRD: CPT | Performed by: STUDENT IN AN ORGANIZED HEALTH CARE EDUCATION/TRAINING PROGRAM

## 2024-03-29 PROCEDURE — 1159F MED LIST DOCD IN RCRD: CPT | Performed by: STUDENT IN AN ORGANIZED HEALTH CARE EDUCATION/TRAINING PROGRAM

## 2024-03-29 PROCEDURE — 73030 X-RAY EXAM OF SHOULDER: CPT | Mod: RIGHT SIDE | Performed by: STUDENT IN AN ORGANIZED HEALTH CARE EDUCATION/TRAINING PROGRAM

## 2024-03-29 RX ORDER — TRIAMCINOLONE ACETONIDE 40 MG/ML
40 INJECTION, SUSPENSION INTRA-ARTICULAR; INTRAMUSCULAR
Status: COMPLETED | OUTPATIENT
Start: 2024-03-29 | End: 2024-03-29

## 2024-03-29 RX ORDER — LIDOCAINE HYDROCHLORIDE 10 MG/ML
4 INJECTION INFILTRATION; PERINEURAL
Status: COMPLETED | OUTPATIENT
Start: 2024-03-29 | End: 2024-03-29

## 2024-03-29 RX ADMIN — TRIAMCINOLONE ACETONIDE 40 MG: 40 INJECTION, SUSPENSION INTRA-ARTICULAR; INTRAMUSCULAR at 11:31

## 2024-03-29 RX ADMIN — LIDOCAINE HYDROCHLORIDE 4 ML: 10 INJECTION INFILTRATION; PERINEURAL at 11:31

## 2024-03-29 NOTE — PROGRESS NOTES
Chief Complaint   Patient presents with    Right Knee - Pain     Xrays Today    Right Shoulder - Pain     Xrays Today       HPI  68-year-old female presents today for evaluation of her right knee as well as her right shoulder.  Regarding the knee she has been having cable occasional episodes of clicking catching popping as well as episodes where he feels like she cannot bend or straighten her knee due to sharp pains.  This has been present for quite some time he has not attempted any cortisone shots yet in her knee.    Regarding the right shoulder has been having some shoulder pain and discomfort particular with reaching type activities.  Also pain with activities above the level of her head.  Did have prior cortisone shot in the left shoulder many years ago which did provide her some relief hoping to get another shot today    Past Medical History:   Diagnosis Date    Acute upper respiratory infection, unspecified 09/05/2018    URI, acute    Chronic obstructive pulmonary disease, unspecified (CMS/HCC) 11/15/2022    COPD (chronic obstructive pulmonary disease)    Essential (primary) hypertension 11/15/2022    Hypertension    Gastro-esophageal reflux disease without esophagitis 11/15/2022    GERD (gastroesophageal reflux disease)       Past Surgical History:   Procedure Laterality Date    INCISIONAL BREAST BIOPSY  10/03/2015    Incisional Breast Biopsy        Allergies   Allergen Reactions    Codeine Nausea Only        Physical exam    General: Alert and oriented to place, person, and time.  No acute distress and breathing comfortably; pleasant and cooperative with the examination.  HEENT: Head is normocephalic and atraumatic.  Neck: Supple, no visible swelling.  Cardiovascular: Good perfusion to the affected extremity.  Lungs: No audible wheezing or labored breathing.  Abdomen: Nondistended  HEME/Lymph : No visible abnormalities bilateral lower extremity    Extremity:  Right shoulder:     Skin healthy to gross  inspection  No ecchymosis, no swelling, no gross atrophy     No tenderness to palpation over acromioclavicular joint  No tenderness to palpation over biceps tendon  No tenderness to palpation over the cervical spine      ROM:  No significant decrease in forward flexion, internal or external rotation      Strength:  4/5 Resisted elevation  5/5 Resisted external rotation  Negative lift off test   Negative Spurling´s test  Positive Neer and Hawking´s test  Mild pain with Speed's test  Neurovascular exam normal distally    Left knee:  Skin healthy and intact  No gross swelling or ecchymosis  No significant varus or valgus malalignment  Effusion: Mild     ROM:  Full flexion   Full extension  No pain with internal rotation of the hip  Tenderness to palpation: Medial and lateral joint line     No laxity to valgus stress  No laxity to varus stress  Negative Lachman´s test  Negative anterior drawer test  Negative posterior drawer test  Positive Cony´s test     Neurovascular exam normal distally  Diagnostics:  XR knee right 4+ views    Result Date: 3/29/2024  Interpreted By:  Michael Pryor III, STUDY: XR KNEE RIGHT 4+ VIEWS; ;  3/29/2024 10:32 am   INDICATION: Signs/Symptoms:Pain.   COMPARISON: None.   ACCESSION NUMBER(S): [id number]   ORDERING CLINICIAN: MICHAEL PRYOR   FINDINGS: 4 weight-bearing views right knee: No acute osseous abnormality no fracture or dislocation appreciated moderate suprapatellar effusion. Mild joint space narrowing about the 3 compartments of the knee.       No acute osseous abnormality     MACRO: None   Signed by: Michael Pryor III 3/29/2024 10:56 AM Dictation workstation:   [id number]    XR shoulder right 2+ views    Result Date: 3/29/2024  Interpreted By:  Michael Pryor III, STUDY: XR SHOULDER RIGHT 2+ VIEWS; ;  3/29/2024 10:26 am   INDICATION: Signs/Symptoms:Pain.   COMPARISON: None.   ACCESSION NUMBER(S): [id number]   ORDERING CLINICIAN: MICHAEL PRYOR   FINDINGS: Four views right  shoulder: No acute osseous abnormality no fracture or dislocation appreciated mild-to-moderate joint space narrowing about the glenohumeral articulation with osteophytosis formation about the inferior aspect of the glenoid as well as the inferior aspect of the humeral head consistent with mild to moderate arthritis       Mild right shoulder arthritis     MACRO: None   Signed by: Henok Conteh III 3/29/2024 10:29 AM Dictation workstation:   [id number]    BI mammo bilateral screening tomosynthesis    Result Date: 3/5/2024  Interpreted By:  Chino Barber, STUDY: BI MAMMO BILATERAL SCREENING TOMOSYNTHESIS;  3/5/2024 11:37 am   ACCESSION NUMBER(S): [id number]   ORDERING CLINICIAN: JAYA WRIGHT   INDICATION: Screening.   COMPARISON: 06/06/2022   FINDINGS: 2D and tomosynthesis images were reviewed at 1 mm slice thickness.   Density:  The breast tissue is heterogeneously dense, which may obscure small masses.   No suspicious masses or calcifications are identified. There are stable areas of asymmetry bilaterally.   This study was interpreted with CAD.       No mammographic evidence of malignancy.   BI-RADS CATEGORY:   BI-RADS Category:  1 Negative. Recommendation:  Annual Screening. Recommended Date:  1 Year. Laterality:  Bilateral.   For any future breast imaging appointments, please call [phone] ([phone]).     MACRO: None   Signed by: Chino Barber 3/5/2024 11:39 AM Dictation workstation:   [id number]       Procedure:  L Inj/Asp: R knee on 3/29/2024 11:31 AM  Indications: pain  Details: 22 G needle, anterolateral approach  Medications: 40 mg triamcinolone acetonide 40 mg/mL; 4 mL lidocaine 10 mg/mL (1 %)  Consent was given by the patient. Immediately prior to procedure a time out was called to verify the correct patient, procedure, equipment, support staff and site/side marked as required. Patient was prepped and draped in the usual sterile fashion.       L Inj/Asp: R subacromial bursa on 3/29/2024 11:31  AM  Indications: pain  Details: 22 G needle, posterior approach  Medications: 40 mg triamcinolone acetonide 40 mg/mL; 4 mL lidocaine 10 mg/mL (1 %)  Consent was given by the patient. Immediately prior to procedure a time out was called to verify the correct patient, procedure, equipment, support staff and site/side marked as required. Patient was prepped and draped in the usual sterile fashion.           Assessment:  68-year-old female with concerns for meniscus tear right knee, moderate right shoulder arthritis    Treatment plan:  The natural history of the condition and its associated treatment alternatives including surgical and nonsurgical options were discussed with the patient at length.  Will proceed with a subacromial injection of the right shoulder as well as an injection of the knee today.  Discussed activities to avoid as well as importance of using pain as a guide  If patient fails to improve may benefit from an MRI  I discussed risks and benefits of corticosteroid injection and the patient would like to proceed.  Discussed risks of skin depigmentation and the rare but possible intravascular injection of local anesthetic which can cause palpitations or arrhythmias. I discussed the possibility of a transient increase in blood sugar. I explained that the local anesthetic tends to work immediately, it may take 2-3 days for the full effect of the corticosteroid compound. Consent was obtained and side confirmed by the patient.    Mechanical symptoms about the knee do sound to be consistent with meniscus tear however we will continue with conservative treatment.  All of the patient's questions were answered.

## 2024-04-29 ENCOUNTER — INFUSION (OUTPATIENT)
Dept: HEMATOLOGY/ONCOLOGY | Facility: CLINIC | Age: 69
End: 2024-04-29
Payer: MEDICARE

## 2024-04-29 VITALS
DIASTOLIC BLOOD PRESSURE: 71 MMHG | HEIGHT: 59 IN | SYSTOLIC BLOOD PRESSURE: 127 MMHG | HEART RATE: 73 BPM | BODY MASS INDEX: 18.71 KG/M2 | RESPIRATION RATE: 20 BRPM | OXYGEN SATURATION: 93 % | WEIGHT: 92.81 LBS | TEMPERATURE: 98.8 F

## 2024-04-29 DIAGNOSIS — M81.0 AGE-RELATED OSTEOPOROSIS WITHOUT CURRENT PATHOLOGICAL FRACTURE: ICD-10-CM

## 2024-04-29 PROCEDURE — 2500000004 HC RX 250 GENERAL PHARMACY W/ HCPCS (ALT 636 FOR OP/ED): Performed by: FAMILY MEDICINE

## 2024-04-29 PROCEDURE — 96374 THER/PROPH/DIAG INJ IV PUSH: CPT | Mod: INF

## 2024-04-29 RX ORDER — ALBUTEROL SULFATE 0.83 MG/ML
3 SOLUTION RESPIRATORY (INHALATION) AS NEEDED
OUTPATIENT
Start: 2024-04-29

## 2024-04-29 RX ORDER — DIPHENHYDRAMINE HYDROCHLORIDE 50 MG/ML
50 INJECTION INTRAMUSCULAR; INTRAVENOUS AS NEEDED
OUTPATIENT
Start: 2024-04-29

## 2024-04-29 RX ORDER — EPINEPHRINE 0.3 MG/.3ML
0.3 INJECTION SUBCUTANEOUS EVERY 5 MIN PRN
OUTPATIENT
Start: 2024-04-29

## 2024-04-29 RX ORDER — ZOLEDRONIC ACID 5 MG/100ML
5 INJECTION, SOLUTION INTRAVENOUS ONCE
Qty: 100 ML | Refills: 0 | Status: COMPLETED | OUTPATIENT
Start: 2024-04-29 | End: 2024-04-29

## 2024-04-29 RX ORDER — FAMOTIDINE 10 MG/ML
20 INJECTION INTRAVENOUS ONCE AS NEEDED
OUTPATIENT
Start: 2024-04-29

## 2024-04-29 RX ORDER — ZOLEDRONIC ACID 5 MG/100ML
5 INJECTION, SOLUTION INTRAVENOUS ONCE
Status: CANCELLED | OUTPATIENT
Start: 2024-04-29

## 2024-04-29 RX ADMIN — ZOLEDRONIC ACID 5 MG: 0.05 INJECTION, SOLUTION INTRAVENOUS at 09:00

## 2024-04-29 ASSESSMENT — PAIN SCALES - GENERAL: PAINLEVEL: 0-NO PAIN

## 2024-04-29 NOTE — PROGRESS NOTES
Reclast Lexicomp patient information given and reviewed with patient.  Patient tolerated Reclast infusion without sign of adverse reaction.  Patient [id number] minutes post infusion.  Patient encouraged to drink water today.  Discharged in stable condition.

## 2024-05-28 ENCOUNTER — APPOINTMENT (OUTPATIENT)
Dept: PRIMARY CARE | Facility: CLINIC | Age: 69
End: 2024-05-28
Payer: MEDICARE

## 2024-06-17 ENCOUNTER — TELEPHONE (OUTPATIENT)
Dept: PRIMARY CARE | Facility: CLINIC | Age: 69
End: 2024-06-17
Payer: MEDICARE

## 2024-06-17 NOTE — TELEPHONE ENCOUNTER
----- Message from Rosy Varela RN sent at 6/17/2024 12:26 PM EDT -----  Regarding: Cologuard  Contact pt- Cologuard not completed

## 2024-06-29 LAB — NONINV COLON CA DNA+OCC BLD SCRN STL QL: NORMAL

## 2024-07-02 ENCOUNTER — APPOINTMENT (OUTPATIENT)
Dept: PRIMARY CARE | Facility: CLINIC | Age: 69
End: 2024-07-02
Payer: MEDICARE

## 2024-07-02 VITALS
TEMPERATURE: 97.2 F | DIASTOLIC BLOOD PRESSURE: 76 MMHG | HEIGHT: 59 IN | HEART RATE: 83 BPM | BODY MASS INDEX: 20.28 KG/M2 | WEIGHT: 100.6 LBS | OXYGEN SATURATION: 92 % | SYSTOLIC BLOOD PRESSURE: 109 MMHG

## 2024-07-02 DIAGNOSIS — E55.9 VITAMIN D DEFICIENCY: ICD-10-CM

## 2024-07-02 DIAGNOSIS — E78.2 HYPERLIPIDEMIA, MIXED: ICD-10-CM

## 2024-07-02 DIAGNOSIS — J44.9 CHRONIC OBSTRUCTIVE PULMONARY DISEASE, UNSPECIFIED COPD TYPE (MULTI): ICD-10-CM

## 2024-07-02 DIAGNOSIS — K21.9 GASTROESOPHAGEAL REFLUX DISEASE WITHOUT ESOPHAGITIS: ICD-10-CM

## 2024-07-02 DIAGNOSIS — I10 PRIMARY HYPERTENSION: Primary | ICD-10-CM

## 2024-07-02 DIAGNOSIS — Z12.11 ENCOUNTER FOR SCREENING FOR MALIGNANT NEOPLASM OF COLON: ICD-10-CM

## 2024-07-02 DIAGNOSIS — M81.0 AGE-RELATED OSTEOPOROSIS WITHOUT CURRENT PATHOLOGICAL FRACTURE: ICD-10-CM

## 2024-07-02 DIAGNOSIS — R05.1 ACUTE COUGH: ICD-10-CM

## 2024-07-02 PROCEDURE — 99214 OFFICE O/P EST MOD 30 MIN: CPT | Performed by: FAMILY MEDICINE

## 2024-07-02 PROCEDURE — 3078F DIAST BP <80 MM HG: CPT | Performed by: FAMILY MEDICINE

## 2024-07-02 PROCEDURE — 3008F BODY MASS INDEX DOCD: CPT | Performed by: FAMILY MEDICINE

## 2024-07-02 PROCEDURE — 1123F ACP DISCUSS/DSCN MKR DOCD: CPT | Performed by: FAMILY MEDICINE

## 2024-07-02 PROCEDURE — 1158F ADVNC CARE PLAN TLK DOCD: CPT | Performed by: FAMILY MEDICINE

## 2024-07-02 PROCEDURE — 1159F MED LIST DOCD IN RCRD: CPT | Performed by: FAMILY MEDICINE

## 2024-07-02 PROCEDURE — 1160F RVW MEDS BY RX/DR IN RCRD: CPT | Performed by: FAMILY MEDICINE

## 2024-07-02 PROCEDURE — 3074F SYST BP LT 130 MM HG: CPT | Performed by: FAMILY MEDICINE

## 2024-07-02 RX ORDER — LANOLIN ALCOHOL/MO/W.PET/CERES
400 CREAM (GRAM) TOPICAL DAILY
Qty: 90 TABLET | Refills: 0 | Status: CANCELLED | OUTPATIENT
Start: 2024-07-02

## 2024-07-02 RX ORDER — ALBUTEROL SULFATE 0.83 MG/ML
2.5 SOLUTION RESPIRATORY (INHALATION) 4 TIMES DAILY PRN
Qty: 150 ML | Refills: 1 | Status: SHIPPED | OUTPATIENT
Start: 2024-07-02 | End: 2025-07-02

## 2024-07-02 RX ORDER — ATENOLOL 50 MG/1
50 TABLET ORAL DAILY
Qty: 90 TABLET | Refills: 0 | Status: SHIPPED | OUTPATIENT
Start: 2024-07-02

## 2024-07-02 RX ORDER — ALBUTEROL SULFATE 90 UG/1
2 AEROSOL, METERED RESPIRATORY (INHALATION) EVERY 4 HOURS PRN
Qty: 18 G | Refills: 1 | Status: SHIPPED | OUTPATIENT
Start: 2024-07-02

## 2024-07-02 RX ORDER — NEBULIZER ACCESSORIES
KIT MISCELLANEOUS
Qty: 1 KIT | Refills: 1 | Status: SHIPPED | OUTPATIENT
Start: 2024-07-02

## 2024-07-02 RX ORDER — OMEPRAZOLE 20 MG/1
20 CAPSULE, DELAYED RELEASE ORAL DAILY
Qty: 90 CAPSULE | Refills: 0 | Status: SHIPPED | OUTPATIENT
Start: 2024-07-02

## 2024-07-02 RX ORDER — AMLODIPINE BESYLATE 5 MG/1
5 TABLET ORAL DAILY
Qty: 90 TABLET | Refills: 0 | Status: SHIPPED | OUTPATIENT
Start: 2024-07-02

## 2024-07-02 RX ORDER — CEFUROXIME AXETIL 250 MG/1
250 TABLET ORAL 2 TIMES DAILY
Qty: 20 TABLET | Refills: 0 | Status: SHIPPED | OUTPATIENT
Start: 2024-07-02 | End: 2024-07-12

## 2024-07-02 RX ORDER — NEBULIZER AND COMPRESSOR
1 EACH MISCELLANEOUS EVERY 6 HOURS PRN
Qty: 1 EACH | Refills: 0 | Status: SHIPPED | OUTPATIENT
Start: 2024-07-02

## 2024-07-02 NOTE — PROGRESS NOTES
Subjective   Patient ID: Little De La Cruz is a 68 y.o. female who presents for Follow-up.    HPI       C/o having too much phlegm in the throat x 1 week   She has a cough.    Had Reclast infusion done 4/29/2024    Cologuard: 06/28/2024 has to be redone   Mammo: 03/29/2024          She has COPD.  She has not been taking the Trelegy.  She uses Albuterol as needed.  Denies any exacerbations.  Has followed with Dr. Roque (pulm) as well.      She has hypertension.  Patient does not monitor BP at home.   Denies CP, dizziness, and LE edema.   Patient is compliant with antihypertensive therapy and denies any noted side effects.      She has hyperlipidemia.  Patient does not try to limit the amount of fatty foods and high cholesterol foods that he consumes  She is not on any lipid lowering medication at this time and has been treated with lifestyle modification and dietary changes.     She has vitamin D deficiency.  She currently is on a vitamin d supplement. Patient not able to recall the dose of the supplement.      Pt has GERD.  Her GERD is stable with omeprazole.   She denies any breakthrough reflux symptoms.      She has osteoporosis.  Pt is treated with Reclast and has tolerated it well.  Next Reclast infusion is due 4/29/2025.         Review of Systems  Constitutional: Patient denies any fever, chills, loss of appetite, or unexplained weight loss.  Cardiovascular: Patient denies any chest pain, shortness of breath with exertion, tachycardia, palpitations, orthopnea, or paroxysmal nocturnal dyspnea.    Respiratory: Patient denies any shortness breath, or wheezing.  Positive for cough. Positive for phlegm.     Gastrointestinal: Patient denies any nausea, vomiting, diarrhea, constipation, melena, hematochezia, or reflux symptoms  Skin: Denies any rashes or skin lesions  Neurology: Patient denies any new motor or sensory losses. Denies any numbness, tingling, weakness, and incoordination of the extremities. Patient also  "denies any tremor, seizures, or gait instability.  Endocrinology: Denies any polyuria, polydipsia, polyphagia, or heat/cold intolerance.      Objective   /76   Pulse 83   Temp 36.2 °C (97.2 °F)   Ht 1.499 m (4' 11\")   Wt 45.6 kg (100 lb 9.6 oz)   SpO2 92%   BMI 20.32 kg/m²     Physical Exam  General Appearance: Alert and cooperative, in no acute distress, well-developed/well-nourished.  Neck: Supple and without adenopathy or rigidity. There is no JVD at 90° and no carotid bruits are noted. There is no thyromegaly, thyroid tenderness, or palpable thyroid nodules.  Heart: Regular rate and rhythm without murmur or ectopy.  Lungs: Clear to auscultation bilaterally with good air exchange.  Skin: Good turgor, moist, warm and without rashes or lesions.  Neurological exam: Alert and oriented ×3, no tremor, normal gait.  Extremities: No clubbing, cyanosis, or edema      Assessment/Plan     Primary hypertension  Blood pressure appears adequately controlled and we will continue with the current antihypertensive therapy.     Hyperlipidemia, mixed  Stable based on labs.  Patient will continue with the current conservative care.    Dietary changes, exercise, and maintenance of healthy weight were discussed at length.     Gastroesophageal reflux disease without esophagitis  Stable with the current treatment plan.  No breakthrough reflux symptoms.     Chronic obstructive pulmonary disease, unspecified COPD type  Stable based on symptoms.  Continue current treatment.      Vitamin D deficiency  Stable based on labs.  We will continue the current vitamin D supplementation.     Age-related osteoporosis without current pathological fracture  Has tolerated Reclast well.  Reclast infusion will be due and was ordered.     Screening for colon cancer:  6/28/2024 sample not collected properly and testing not completed.  Cologuard was re-ordered     Acute cough:  Recommended she get OTC Mucinex plain or DM  If the symptoms do not " subside in 5 days will order an antibiotic.      MCAW DUE 2025  DEXA DUE 2024 (ordered 2024)  MAMM DUE 3/6/2025  COLOGUARD (24 Sample not collected properly)  RECLAST DUE 2025    Follow up in 3 months.       Scribe Attestation  By signing my name below, I, Angela Eid   attest that this documentation has been prepared under the direction and in the presence of Alber Damian DO.    Orders Placed This Encounter   Procedures    XR DEXA bone density    Cologuard® colon cancer screening    Vitamin D 25-Hydroxy,Total (for eval of Vitamin D levels)    Basic Metabolic Panel    Lipid Panel     Requested Prescriptions     Signed Prescriptions Disp Refills    nebulizer accessories (Reusable Nebulizer Kit) kit 1 kit 1     Sig: Use nebulizer every 6 hours as needed.    nebulizer and compressor device 1 each 0     Si Device every 6 hours if needed (sob or wheezint).    albuterol 90 mcg/actuation inhaler 18 g 1     Sig: Inhale 2 puffs every 4 hours if needed for wheezing or shortness of breath.    albuterol 2.5 mg /3 mL (0.083 %) nebulizer solution 150 mL 1     Sig: Take 3 mL (2.5 mg) by nebulization 4 times a day as needed for wheezing or shortness of breath.    omeprazole (PriLOSEC) 20 mg DR capsule 90 capsule 0     Sig: Take 1 capsule (20 mg) by mouth once daily.    amLODIPine (Norvasc) 5 mg tablet 90 tablet 0     Sig: Take 1 tablet (5 mg) by mouth once daily.    atenolol (Tenormin) 50 mg tablet 90 tablet 0     Sig: Take 1 tablet (50 mg) by mouth once daily.    cefuroxime (Ceftin) 250 mg tablet 20 tablet 0     Sig: Take 1 tablet (250 mg) by mouth 2 times a day for 10 days.

## 2024-07-02 NOTE — PATIENT INSTRUCTIONS
Follow up in 3 months with labs to be done PRIOR.    It was a pleasure to see you today. Thank you for choosing us for your health care needs.    If you have lab or other testing completed and have not been informed of results within one week, please call the office for your results.    If you haven't done so, consider signing up for [facility], the [facility] personal health record. Ask the staff how you can get started.

## 2024-08-05 DIAGNOSIS — J44.9 CHRONIC OBSTRUCTIVE PULMONARY DISEASE, UNSPECIFIED COPD TYPE (MULTI): ICD-10-CM

## 2024-08-05 RX ORDER — NEBULIZER ACCESSORIES
KIT MISCELLANEOUS
Qty: 1 KIT | Refills: 0 | Status: SHIPPED | OUTPATIENT
Start: 2024-08-05

## 2024-08-05 RX ORDER — NEBULIZER AND COMPRESSOR
1 EACH MISCELLANEOUS EVERY 6 HOURS PRN
Qty: 1 EACH | Refills: 0 | Status: SHIPPED | OUTPATIENT
Start: 2024-08-05

## 2024-08-05 RX ORDER — ALBUTEROL SULFATE 0.83 MG/ML
2.5 SOLUTION RESPIRATORY (INHALATION) 4 TIMES DAILY PRN
Qty: 150 ML | Refills: 2 | Status: SHIPPED | OUTPATIENT
Start: 2024-08-05 | End: 2024-08-05 | Stop reason: SDUPTHER

## 2024-08-05 RX ORDER — ALBUTEROL SULFATE 0.83 MG/ML
2.5 SOLUTION RESPIRATORY (INHALATION) 4 TIMES DAILY PRN
Qty: 150 ML | Refills: 2 | Status: SHIPPED | OUTPATIENT
Start: 2024-08-05 | End: 2025-02-01

## 2024-08-12 ENCOUNTER — HOSPITAL ENCOUNTER (OUTPATIENT)
Dept: RADIOLOGY | Facility: CLINIC | Age: 69
Discharge: HOME | End: 2024-08-12
Payer: MEDICARE

## 2024-08-12 DIAGNOSIS — M81.0 AGE-RELATED OSTEOPOROSIS WITHOUT CURRENT PATHOLOGICAL FRACTURE: ICD-10-CM

## 2024-08-12 PROCEDURE — 77080 DXA BONE DENSITY AXIAL: CPT

## 2024-08-12 PROCEDURE — 77080 DXA BONE DENSITY AXIAL: CPT | Performed by: RADIOLOGY

## 2024-08-12 ASSESSMENT — LIFESTYLE VARIABLES
CURRENT_SMOKER: N
3_OR_MORE_DRINKS_PER_DAY: N

## 2024-10-01 DIAGNOSIS — I10 PRIMARY HYPERTENSION: ICD-10-CM

## 2024-10-01 RX ORDER — AMLODIPINE BESYLATE 5 MG/1
5 TABLET ORAL DAILY
Qty: 90 TABLET | Refills: 0 | Status: SHIPPED | OUTPATIENT
Start: 2024-10-01

## 2024-10-01 RX ORDER — ATENOLOL 50 MG/1
50 TABLET ORAL DAILY
Qty: 90 TABLET | Refills: 0 | Status: SHIPPED | OUTPATIENT
Start: 2024-10-01

## 2024-10-02 ENCOUNTER — APPOINTMENT (OUTPATIENT)
Dept: PRIMARY CARE | Facility: CLINIC | Age: 69
End: 2024-10-02
Payer: MEDICARE

## 2024-10-23 PROBLEM — J41.0 SIMPLE CHRONIC BRONCHITIS (MULTI): Status: ACTIVE | Noted: 2024-10-23

## 2024-10-23 PROBLEM — J34.89 NASAL DISCHARGE: Status: ACTIVE | Noted: 2024-10-23

## 2024-10-23 PROBLEM — U07.1 DISEASE DUE TO SEVERE ACUTE RESPIRATORY SYNDROME CORONAVIRUS 2 (SARS-COV-2): Status: ACTIVE | Noted: 2024-10-23

## 2024-10-24 ENCOUNTER — APPOINTMENT (OUTPATIENT)
Dept: PRIMARY CARE | Facility: CLINIC | Age: 69
End: 2024-10-24
Payer: MEDICARE

## 2024-10-24 ENCOUNTER — PATIENT MESSAGE (OUTPATIENT)
Dept: PRIMARY CARE | Facility: CLINIC | Age: 69
End: 2024-10-24

## 2024-10-24 VITALS
BODY MASS INDEX: 17.72 KG/M2 | HEART RATE: 78 BPM | HEIGHT: 59 IN | WEIGHT: 87.9 LBS | OXYGEN SATURATION: 96 % | SYSTOLIC BLOOD PRESSURE: 113 MMHG | DIASTOLIC BLOOD PRESSURE: 76 MMHG | TEMPERATURE: 98.1 F

## 2024-10-24 DIAGNOSIS — E55.9 VITAMIN D DEFICIENCY: ICD-10-CM

## 2024-10-24 DIAGNOSIS — R06.02 SOB (SHORTNESS OF BREATH): ICD-10-CM

## 2024-10-24 DIAGNOSIS — R05.3 CHRONIC COUGH: ICD-10-CM

## 2024-10-24 DIAGNOSIS — R63.4 WEIGHT LOSS: ICD-10-CM

## 2024-10-24 DIAGNOSIS — J44.9 CHRONIC OBSTRUCTIVE PULMONARY DISEASE, UNSPECIFIED COPD TYPE (MULTI): Primary | ICD-10-CM

## 2024-10-24 DIAGNOSIS — M81.0 AGE-RELATED OSTEOPOROSIS WITHOUT CURRENT PATHOLOGICAL FRACTURE: ICD-10-CM

## 2024-10-24 DIAGNOSIS — I10 PRIMARY HYPERTENSION: Primary | ICD-10-CM

## 2024-10-24 DIAGNOSIS — E78.2 HYPERLIPIDEMIA, MIXED: ICD-10-CM

## 2024-10-24 DIAGNOSIS — J44.9 CHRONIC OBSTRUCTIVE PULMONARY DISEASE, UNSPECIFIED COPD TYPE (MULTI): ICD-10-CM

## 2024-10-24 DIAGNOSIS — M62.838 MUSCLE SPASM: ICD-10-CM

## 2024-10-24 DIAGNOSIS — J44.1 ACUTE EXACERBATION OF CHRONIC OBSTRUCTIVE PULMONARY DISEASE (COPD) (MULTI): ICD-10-CM

## 2024-10-24 DIAGNOSIS — K21.9 GASTROESOPHAGEAL REFLUX DISEASE WITHOUT ESOPHAGITIS: ICD-10-CM

## 2024-10-24 PROCEDURE — 99214 OFFICE O/P EST MOD 30 MIN: CPT | Performed by: FAMILY MEDICINE

## 2024-10-24 PROCEDURE — 1159F MED LIST DOCD IN RCRD: CPT | Performed by: FAMILY MEDICINE

## 2024-10-24 PROCEDURE — 3074F SYST BP LT 130 MM HG: CPT | Performed by: FAMILY MEDICINE

## 2024-10-24 PROCEDURE — 3008F BODY MASS INDEX DOCD: CPT | Performed by: FAMILY MEDICINE

## 2024-10-24 PROCEDURE — 1160F RVW MEDS BY RX/DR IN RCRD: CPT | Performed by: FAMILY MEDICINE

## 2024-10-24 PROCEDURE — 3078F DIAST BP <80 MM HG: CPT | Performed by: FAMILY MEDICINE

## 2024-10-24 PROCEDURE — 1123F ACP DISCUSS/DSCN MKR DOCD: CPT | Performed by: FAMILY MEDICINE

## 2024-10-24 RX ORDER — PREDNISONE 20 MG/1
TABLET ORAL
Qty: 12 TABLET | Refills: 0 | Status: SHIPPED | OUTPATIENT
Start: 2024-10-24

## 2024-10-24 RX ORDER — BROMPHENIRAMINE MALEATE, PSEUDOEPHEDRINE HYDROCHLORIDE, AND DEXTROMETHORPHAN HYDROBROMIDE 2; 30; 10 MG/5ML; MG/5ML; MG/5ML
5 SYRUP ORAL 4 TIMES DAILY PRN
Qty: 120 ML | Refills: 0 | Status: SHIPPED | OUTPATIENT
Start: 2024-10-24 | End: 2024-11-03

## 2024-10-24 RX ORDER — CEFUROXIME AXETIL 250 MG/1
250 TABLET ORAL 2 TIMES DAILY
Qty: 20 TABLET | Refills: 0 | Status: SHIPPED | OUTPATIENT
Start: 2024-10-24 | End: 2024-11-03

## 2024-10-24 RX ORDER — LANOLIN ALCOHOL/MO/W.PET/CERES
400 CREAM (GRAM) TOPICAL DAILY
Qty: 90 TABLET | Refills: 0 | Status: SHIPPED | OUTPATIENT
Start: 2024-10-24

## 2024-10-24 RX ORDER — ALBUTEROL SULFATE 90 UG/1
2 INHALANT RESPIRATORY (INHALATION) EVERY 4 HOURS PRN
Qty: 18 G | Refills: 1 | Status: SHIPPED | OUTPATIENT
Start: 2024-10-24

## 2024-10-24 RX ORDER — OMEPRAZOLE 20 MG/1
20 CAPSULE, DELAYED RELEASE ORAL DAILY
Qty: 90 CAPSULE | Refills: 0 | Status: SHIPPED | OUTPATIENT
Start: 2024-10-24

## 2024-10-24 RX ORDER — AMLODIPINE BESYLATE 5 MG/1
5 TABLET ORAL DAILY
Qty: 90 TABLET | Refills: 0 | Status: SHIPPED | OUTPATIENT
Start: 2024-10-24

## 2024-10-24 RX ORDER — ATENOLOL 50 MG/1
50 TABLET ORAL DAILY
Qty: 90 TABLET | Refills: 0 | Status: SHIPPED | OUTPATIENT
Start: 2024-10-24

## 2024-10-24 ASSESSMENT — PATIENT HEALTH QUESTIONNAIRE - PHQ9
1. LITTLE INTEREST OR PLEASURE IN DOING THINGS: NOT AT ALL
SUM OF ALL RESPONSES TO PHQ9 QUESTIONS 1 AND 2: 0
2. FEELING DOWN, DEPRESSED OR HOPELESS: NOT AT ALL

## 2024-10-24 NOTE — PROGRESS NOTES
Subjective   Patient ID: Little De La Cruz is a 69 y.o. female who presents for Follow-up.    HPI     Patient c/o constant coughing.  It is productive at times.  She states she coughed all night.   She had used a cough syrup in the past that helped and wondered if she could get a Rx for it.    Patient has lost some weight since last OV.   She has lost 13 pounds since last visit.   She states she is eating the same and reports no changes in her diet or activity.      Last Reclast was 04/29/2024    No recent BW  Dexa: 08/12/2024  Mamm: 03/2024  ColoRect:  sample was not collect right   AWV: 02/20/2024      She has COPD.  10/24/2024:  Pt reports that she has not been taking the Trelegy.  She uses Albuterol as needed.  Denies any exacerbations.  Follows with Dr. Roque (pulm) as well.      She has hypertension.  Patient does not monitor BP at home.   Denies CP, dizziness, and LE edema.   Patient is compliant with antihypertensive therapy and denies any noted side effects.      She has hyperlipidemia.  Patient does not try to limit the amount of fatty foods and high cholesterol foods that he consumes  She is not on any lipid lowering medication at this time and has been treated with lifestyle modification and dietary changes.     She has vitamin D deficiency.  She currently is on a vitamin d supplement. Patient not able to recall the dose of the supplement.      Pt has GERD.  Her GERD is stable with omeprazole.   She denies any breakthrough reflux symptoms.      She has osteoporosis.  Pt is treated with Reclast and has tolerated it well.  Next Reclast infusion is due 4/29/2025.    She quit smoking in 2005.    Review of Systems  Constitutional: Patient denies any fever, chills, loss of appetite, or unexplained weight loss.  Cardiovascular: Patient denies any chest pain, shortness of breath with exertion, tachycardia, palpitations, orthopnea, or paroxysmal nocturnal dyspnea.    Respiratory: Patient denies any  "wheezing.  Positive for chronic cough and SOB.  SOB is a baseline.    Gastrointestinal: Patient denies any nausea, vomiting, diarrhea, constipation, melena, hematochezia, or reflux symptoms  Skin: Denies any rashes or skin lesions  Neurology: Patient denies any new motor or sensory losses. Denies any numbness, tingling, weakness, and incoordination of the extremities. Patient also denies any tremor, seizures, or gait instability.  Endocrinology: Denies any polyuria, polydipsia, polyphagia, or heat/cold intolerance.      Objective   /76   Pulse 78   Temp 36.7 °C (98.1 °F) (Temporal)   Ht 1.499 m (4' 11\")   Wt (!) 39.9 kg (87 lb 14.4 oz)   SpO2 96%   BMI 17.75 kg/m²     Physical Exam  General Appearance: Alert and cooperative, in no acute distress, well-developed/well-nourished, underweight female.     Neck: Supple and without adenopathy or rigidity. There is no JVD at 90° and no carotid bruits are noted. There is no thyromegaly, thyroid tenderness, or palpable thyroid nodules.  Heart: Regular rate and rhythm without murmur or ectopy.    Lungs: Clear to auscultation bilaterally with decreased air exchange.    Skin: Good turgor, moist, warm and without rashes or lesions.  Neurological exam: Alert and oriented ×3, no tremor, normal gait.  Extremities: No clubbing, cyanosis, or edema      Assessment/Plan     Primary hypertension  Stable on in office readings.  Blood pressure appears adequately controlled and we will continue with the current antihypertensive therapy.     Hyperlipidemia, mixed  Stable based on labs.  Patient will continue with the current conservative care.    Dietary changes, exercise, and maintenance of healthy weight were discussed at length.     Gastroesophageal reflux disease without esophagitis  Stable with the current treatment plan.  No breakthrough reflux symptoms.     Chronic obstructive pulmonary disease, unspecified COPD type  Stable based on symptoms.  Continue current " treatment.      Vitamin D deficiency  Stable based on labs.  We will continue the current vitamin D supplementation.     Age-related osteoporosis without current pathological fracture  Has tolerated Reclast well.  Last DEXA was 8/12/2024.  10-year Fracture Risk:  Major Osteoporotic Fracture  23.2  Hip Fracture                        7.2  Next Reclast due 4/2025.     Screening for colon cancer / Weight loss:  6/28/2024 sample not collected properly and testing not completed.  Cologuard was re-ordered last visit.   10/24/2024:  Pt has had some noted unintentional weight loss.  We discussed the need for colon cancer screening again at length.  She agreed to proceed with a colonoscopy and that was ordered today.  CT scan of chest and TSH were ordered for further evaluation.     Chronic cough / SOB / Exacerbation of COPD:  Ordered a chest CT due to the constant cough and weight loss.   Will start her on Ceftin, prednisone, and brompheniramine-psudoeph-DM.  -cefuroxime (Ceftin) 250 mg tablet; Take 1 tablet (250 mg) by outh 2 times a day for 10 days. Dispense: 20 tablet; Refill: 0  - predniSONE (Deltasone) 20 mg tablet; Take 3 tablets for 2 days, then 2 tablets for 2 days, then 1 tablet for 2 days. Dispense: 12 tablet; Refill: 0  - brompheniramine-pseudoeph-DM 2-30-10 mg/5 mL syrup; Take 5 mL by mouth 4 times a day as needed for cough or congestion for up to 10 days. Dispense: 120 mL; Refill: 0         MCAW DUE 2/2025  DEXA DUE 8/13/2026  MAMM DUE 3/6/2025  COLOGUARD (6/28/24 Sample not collected properly)  COLONOSCOPY (ordered today)  RECLAST DUE 4/29/2025    Follow up in 3 months.      Scribe Attestation  By signing my name below, I, Angela Eid   attest that this documentation has been prepared under the direction and in the presence of Alber Damian DO.    Orders Placed This Encounter   Procedures    CT chest wo IV contrast    TSH with reflex to Free T4 if abnormal     Requested Prescriptions     Signed  Prescriptions Disp Refills    omeprazole (PriLOSEC) 20 mg DR capsule 90 capsule 0     Sig: Take 1 capsule (20 mg) by mouth once daily.    magnesium oxide (Mag-Ox) 400 mg (241.3 mg magnesium) tablet 90 tablet 0     Sig: Take 1 tablet (400 mg) by mouth once daily.    amLODIPine (Norvasc) 5 mg tablet 90 tablet 0     Sig: Take 1 tablet (5 mg) by mouth once daily.    atenolol (Tenormin) 50 mg tablet 90 tablet 0     Sig: Take 1 tablet (50 mg) by mouth once daily.    albuterol 90 mcg/actuation inhaler 18 g 1     Sig: Inhale 2 puffs every 4 hours if needed for wheezing or shortness of breath.    cefuroxime (Ceftin) 250 mg tablet 20 tablet 0     Sig: Take 1 tablet (250 mg) by mouth 2 times a day for 10 days.    predniSONE (Deltasone) 20 mg tablet 12 tablet 0     Sig: Take 3 tablets for 2 days, then 2 tablets for 2 days, then 1 tablet for 2 days    brompheniramine-pseudoeph-DM 2-30-10 mg/5 mL syrup 120 mL 0     Sig: Take 5 mL by mouth 4 times a day as needed for cough or congestion for up to 10 days.

## 2024-10-24 NOTE — PATIENT INSTRUCTIONS
Take the medications as prescribed.    Schedule the colonoscopy and CT scan to evaluate the weight loss.  Have labs done soon.    Follow up after testing completed.    It was a pleasure to see you today. Thank you for choosing us for your health care needs.    If you have lab or other testing completed and have not been informed of results within one week, please call the office for your results.    If you haven't done so, consider signing up for [facility] [employer] the [facility] personal health record. Ask the staff how you can get started.

## 2024-11-07 ENCOUNTER — ANCILLARY PROCEDURE (OUTPATIENT)
Facility: HOSPITAL | Age: 69
End: 2024-11-07
Payer: MEDICARE

## 2024-11-07 ENCOUNTER — LAB (OUTPATIENT)
Dept: LAB | Facility: LAB | Age: 69
End: 2024-11-07
Payer: MEDICARE

## 2024-11-07 DIAGNOSIS — R63.4 WEIGHT LOSS: ICD-10-CM

## 2024-11-07 DIAGNOSIS — I10 PRIMARY HYPERTENSION: ICD-10-CM

## 2024-11-07 DIAGNOSIS — E78.2 HYPERLIPIDEMIA, MIXED: ICD-10-CM

## 2024-11-07 DIAGNOSIS — R05.3 CHRONIC COUGH: ICD-10-CM

## 2024-11-07 DIAGNOSIS — R06.02 SOB (SHORTNESS OF BREATH): ICD-10-CM

## 2024-11-07 DIAGNOSIS — E55.9 VITAMIN D DEFICIENCY: ICD-10-CM

## 2024-11-07 LAB
25(OH)D3 SERPL-MCNC: 13 NG/ML (ref 30–100)
ALBUMIN SERPL BCP-MCNC: 3.8 G/DL (ref 3.4–5)
ALP SERPL-CCNC: 73 U/L (ref 33–136)
ALT SERPL W P-5'-P-CCNC: 7 U/L (ref 7–45)
ANION GAP SERPL CALC-SCNC: 14 MMOL/L (ref 10–20)
AST SERPL W P-5'-P-CCNC: 13 U/L (ref 9–39)
BILIRUB SERPL-MCNC: 0.3 MG/DL (ref 0–1.2)
BUN SERPL-MCNC: 12 MG/DL (ref 6–23)
CALCIUM SERPL-MCNC: 8.8 MG/DL (ref 8.6–10.3)
CHLORIDE SERPL-SCNC: 98 MMOL/L (ref 98–107)
CHOLEST SERPL-MCNC: 270 MG/DL (ref 0–199)
CHOLESTEROL/HDL RATIO: 3.5
CO2 SERPL-SCNC: 31 MMOL/L (ref 21–32)
CREAT SERPL-MCNC: 1.01 MG/DL (ref 0.5–1.05)
EGFRCR SERPLBLD CKD-EPI 2021: 60 ML/MIN/1.73M*2
GLUCOSE SERPL-MCNC: 101 MG/DL (ref 74–99)
HDLC SERPL-MCNC: 76.9 MG/DL
LDLC SERPL CALC-MCNC: 165 MG/DL
NON HDL CHOLESTEROL: 193 MG/DL (ref 0–149)
POTASSIUM SERPL-SCNC: 4.9 MMOL/L (ref 3.5–5.3)
PROT SERPL-MCNC: 6.7 G/DL (ref 6.4–8.2)
SODIUM SERPL-SCNC: 138 MMOL/L (ref 136–145)
TRIGL SERPL-MCNC: 140 MG/DL (ref 0–149)
TSH SERPL-ACNC: 2.93 MIU/L (ref 0.44–3.98)
VLDL: 28 MG/DL (ref 0–40)

## 2024-11-07 PROCEDURE — 84443 ASSAY THYROID STIM HORMONE: CPT

## 2024-11-07 PROCEDURE — 36415 COLL VENOUS BLD VENIPUNCTURE: CPT

## 2024-11-07 PROCEDURE — 71250 CT THORAX DX C-: CPT

## 2024-11-07 PROCEDURE — 71250 CT THORAX DX C-: CPT | Performed by: RADIOLOGY

## 2024-11-07 PROCEDURE — 80053 COMPREHEN METABOLIC PANEL: CPT

## 2024-11-07 PROCEDURE — 80061 LIPID PANEL: CPT

## 2024-11-07 PROCEDURE — 82306 VITAMIN D 25 HYDROXY: CPT

## 2024-12-02 ENCOUNTER — E-VISIT (OUTPATIENT)
Dept: PRIMARY CARE | Facility: CLINIC | Age: 69
End: 2024-12-02
Payer: MEDICARE

## 2024-12-02 DIAGNOSIS — J44.9 CHRONIC OBSTRUCTIVE PULMONARY DISEASE, UNSPECIFIED COPD TYPE (MULTI): Primary | ICD-10-CM

## 2024-12-05 ENCOUNTER — TELEPHONE (OUTPATIENT)
Dept: PRIMARY CARE | Facility: CLINIC | Age: 69
End: 2024-12-05
Payer: MEDICARE

## 2025-01-03 DIAGNOSIS — Z12.11 COLON CANCER SCREENING: ICD-10-CM

## 2025-01-06 RX ORDER — SODIUM, POTASSIUM,MAG SULFATES 17.5-3.13G
SOLUTION, RECONSTITUTED, ORAL ORAL
Qty: 1 EACH | Refills: 0 | Status: SHIPPED | OUTPATIENT
Start: 2025-01-06

## 2025-01-08 ENCOUNTER — APPOINTMENT (OUTPATIENT)
Dept: GASTROENTEROLOGY | Facility: HOSPITAL | Age: 70
End: 2025-01-08
Payer: MEDICARE

## 2025-01-14 ENCOUNTER — OFFICE VISIT (OUTPATIENT)
Dept: PRIMARY CARE | Facility: CLINIC | Age: 70
End: 2025-01-14
Payer: MEDICARE

## 2025-01-14 VITALS
DIASTOLIC BLOOD PRESSURE: 72 MMHG | OXYGEN SATURATION: 93 % | HEART RATE: 105 BPM | BODY MASS INDEX: 17.38 KG/M2 | WEIGHT: 86.2 LBS | HEIGHT: 59 IN | TEMPERATURE: 97.7 F | SYSTOLIC BLOOD PRESSURE: 108 MMHG

## 2025-01-14 DIAGNOSIS — R19.7 DIARRHEA OF PRESUMED INFECTIOUS ORIGIN: Primary | ICD-10-CM

## 2025-01-14 DIAGNOSIS — R11.0 NAUSEA: ICD-10-CM

## 2025-01-14 PROCEDURE — 3078F DIAST BP <80 MM HG: CPT | Performed by: FAMILY MEDICINE

## 2025-01-14 PROCEDURE — 3074F SYST BP LT 130 MM HG: CPT | Performed by: FAMILY MEDICINE

## 2025-01-14 PROCEDURE — 1159F MED LIST DOCD IN RCRD: CPT | Performed by: FAMILY MEDICINE

## 2025-01-14 PROCEDURE — 1160F RVW MEDS BY RX/DR IN RCRD: CPT | Performed by: FAMILY MEDICINE

## 2025-01-14 PROCEDURE — 1123F ACP DISCUSS/DSCN MKR DOCD: CPT | Performed by: FAMILY MEDICINE

## 2025-01-14 PROCEDURE — 3008F BODY MASS INDEX DOCD: CPT | Performed by: FAMILY MEDICINE

## 2025-01-14 PROCEDURE — G2211 COMPLEX E/M VISIT ADD ON: HCPCS | Performed by: FAMILY MEDICINE

## 2025-01-14 PROCEDURE — 99214 OFFICE O/P EST MOD 30 MIN: CPT | Performed by: FAMILY MEDICINE

## 2025-01-14 RX ORDER — DIPHENOXYLATE HYDROCHLORIDE AND ATROPINE SULFATE 2.5; .025 MG/1; MG/1
1 TABLET ORAL 4 TIMES DAILY PRN
Qty: 30 TABLET | Refills: 0 | Status: ON HOLD | OUTPATIENT
Start: 2025-01-14 | End: 2025-01-24

## 2025-01-14 RX ORDER — ONDANSETRON 4 MG/1
4-8 TABLET, FILM COATED ORAL EVERY 8 HOURS PRN
Qty: 30 TABLET | Refills: 0 | Status: ON HOLD | OUTPATIENT
Start: 2025-01-14 | End: 2025-01-21

## 2025-01-14 NOTE — PROGRESS NOTES
Subjective   Patient ID: Little De La Cruz is a 69 y.o. female who presents for Abdominal Pain.    HPI       Pt c/o experiencing nausea, vomiting, diarrhea for 1- 2 weeks. Pt states she is having diarrhea multiple times per day. She reports her stool has no form and is clear in color. She is also vomiting clear bile. She has been drinking Gatorade and body armour to try and replenish electrolytes. Pepto bismol has not worked for her.     Denies being around anyone else who was sick or vomiting.   She states her family member had vomiting and diarrhea for a couple days after a family gathering.    Pt also c/o having upper abdominal/ epigastric  pain due to the vomiting.     Pt reports seeing urgent care to be evaluated; neg on Covid and flu test.     Pt did try the kaopectate that was recommended to take by Dr. Damian but she vomited up the medication.     She denies taking any antibiotics recently.   Denies eating anything out of the ordinary.     Pt reports that she never did get a call from medical supply company for the oximetry test.         Review of Systems  Constitutional: Patient denies any fever, chills, loss of appetite, or unexplained weight loss.  Cardiovascular: Patient denies any chest pain, shortness of breath with exertion, tachycardia, palpitations, orthopnea, or paroxysmal nocturnal dyspnea.  Respiratory: Patient denies any cough, shortness of breath, or wheezing.    Gastrointestinal: Patient denies any constipation, melena, hematochezia, or reflux symptoms  Positive for nausea, vomiting, diarrhea.     Skin: Denies any rashes or skin lesions  Neurology: Patient denies any new motor or sensory losses. Denies any numbness, tingling, weakness, and incoordination of the extremities. Patient also denies any tremor, seizures, or gait instability.  Endocrinology: Denies any polyuria, polydipsia, polyphagia, or heat/cold intolerance.    Abdomen: Positive for epigastric / upper abdominal pain due to vomiting.  "    SEE HPI ALSO     Objective   /72   Pulse 105   Temp 36.5 °C (97.7 °F) (Temporal)   Ht 1.499 m (4' 11\")   Wt (!) 39.1 kg (86 lb 3.2 oz)   SpO2 93%   BMI 17.41 kg/m²     Physical Exam  General Appearance: Alert and cooperative, in no acute distress, well-developed/well-nourished.  Neck: Supple and without adenopathy or rigidity. There is no JVD at 90° and no carotid bruits are noted. There is no thyromegaly, thyroid tenderness, or palpable thyroid nodules.  Heart: Regular rate and rhythm without murmur or ectopy.  Lungs: Clear to auscultation bilaterally with good air exchange.  Skin: Good turgor, moist, warm and without rashes or lesions.  Neurological exam: Alert and oriented ×3, no tremor, normal gait.  Extremities: No clubbing, cyanosis, or edema    Abdomen: Soft, nontender/nondistended.  No masses, guarding, rebound, or rigidity.  No hepatosplenomegaly, abdominal bruits, or CVA tenderness.  Bowel sounds are normal.  There is no widening of the aortic pulsation.      Assessment/Plan     1. Diarrhea of presumed infectious origin (Primary) / 2. Nausea  Will prescribe Lomotil for the diarrhea.    Will prescribe Zofran to be used as needed for the nausea.   Ordered labs for further evaluation.   Recommended she mix the Gatorade she has at home half-and-half with water as sugar can make the diarrhea worse.   Recommended G2 instead of regular Gatorade as it has less sugar.   Also recommended she follow the BRAT diet (bananas, rice, apple cause and toast) for a few days due to these foods being easier to digest.   Patient will have diagnostic colonoscopy done that was ordered at her 10/24/2024 visit for unintentional weight loss.   - Stool Pathogen Panel, PCR  - C. difficile, PCR  - Calprotectin Stool; Future  - CBC and Auto Differential; Future  - Comprehensive Metabolic Panel; Future  - Sedimentation Rate; Future  - H. pylori antigen, stool; Future  - diphenoxylate-atropine (Lomotil) 2.5-0.025 mg " tablet; Take 1 tablet by mouth 4 times a day as needed for diarrhea for up to 10 days.  Dispense: 30 tablet; Refill: 0  - ondansetron (Zofran) 4 mg tablet; Take 1-2 tablets (4-8 mg) by mouth every 8 hours if needed for nausea or vomiting for up to 7 days.  Dispense: 30 tablet; Refill: 0      Scribe Attestation  By signing my name below, IJhonatan Scribe   attest that this documentation has been prepared under the direction and in the presence of Alber Damian DO.    Orders Placed This Encounter   Procedures    Stool Pathogen Panel, PCR    C. difficile, PCR    Calprotectin Stool    CBC and Auto Differential    Comprehensive Metabolic Panel    Sedimentation Rate    H. pylori antigen, stool     Requested Prescriptions     Signed Prescriptions Disp Refills    diphenoxylate-atropine (Lomotil) 2.5-0.025 mg tablet 30 tablet 0     Sig: Take 1 tablet by mouth 4 times a day as needed for diarrhea for up to 10 days.    ondansetron (Zofran) 4 mg tablet 30 tablet 0     Sig: Take 1-2 tablets (4-8 mg) by mouth every 8 hours if needed for nausea or vomiting for up to 7 days.

## 2025-01-14 NOTE — PATIENT INSTRUCTIONS
Have testing as ordered.    BRAT diet recommended.    Use the medications as needed.    It was a pleasure to see you today. Thank you for choosing us for your health care needs.    If you have lab or other testing completed and have not been informed of results within one week, please call the office for your results.    If you haven't done so, consider signing up for [facility] [employer], the [facility] personal health record. Ask the staff how you can get started.

## 2025-01-17 ENCOUNTER — CLINICAL SUPPORT (OUTPATIENT)
Dept: PREADMISSION TESTING | Facility: HOSPITAL | Age: 70
End: 2025-01-17
Payer: MEDICARE

## 2025-01-17 VITALS — WEIGHT: 85 LBS | BODY MASS INDEX: 17.14 KG/M2 | HEIGHT: 59 IN

## 2025-01-17 NOTE — PREPROCEDURE INSTRUCTIONS

## 2025-01-18 ENCOUNTER — APPOINTMENT (OUTPATIENT)
Dept: CARDIOLOGY | Facility: HOSPITAL | Age: 70
End: 2025-01-18
Payer: MEDICARE

## 2025-01-18 ENCOUNTER — TELEPHONE (OUTPATIENT)
Dept: PRIMARY CARE | Facility: CLINIC | Age: 70
End: 2025-01-18

## 2025-01-18 ENCOUNTER — HOSPITAL ENCOUNTER (INPATIENT)
Facility: HOSPITAL | Age: 70
End: 2025-01-18
Attending: EMERGENCY MEDICINE | Admitting: STUDENT IN AN ORGANIZED HEALTH CARE EDUCATION/TRAINING PROGRAM
Payer: MEDICARE

## 2025-01-18 ENCOUNTER — APPOINTMENT (OUTPATIENT)
Dept: RADIOLOGY | Facility: HOSPITAL | Age: 70
End: 2025-01-18
Payer: MEDICARE

## 2025-01-18 ENCOUNTER — LAB (OUTPATIENT)
Dept: LAB | Facility: LAB | Age: 70
End: 2025-01-18
Payer: MEDICARE

## 2025-01-18 DIAGNOSIS — E86.0 DEHYDRATION: ICD-10-CM

## 2025-01-18 DIAGNOSIS — R19.7 DIARRHEA OF PRESUMED INFECTIOUS ORIGIN: ICD-10-CM

## 2025-01-18 DIAGNOSIS — R06.02 SHORTNESS OF BREATH AT REST: ICD-10-CM

## 2025-01-18 DIAGNOSIS — J44.9 CHRONIC OBSTRUCTIVE PULMONARY DISEASE, UNSPECIFIED COPD TYPE (MULTI): ICD-10-CM

## 2025-01-18 DIAGNOSIS — N17.9 AKI (ACUTE KIDNEY INJURY): Primary | ICD-10-CM

## 2025-01-18 DIAGNOSIS — E87.6 HYPOKALEMIA: ICD-10-CM

## 2025-01-18 DIAGNOSIS — A04.72 CLOSTRIDIUM DIFFICILE COLITIS: ICD-10-CM

## 2025-01-18 PROBLEM — E87.3 METABOLIC ALKALOSIS: Status: ACTIVE | Noted: 2025-01-18

## 2025-01-18 PROBLEM — R11.2 NAUSEA VOMITING AND DIARRHEA: Status: ACTIVE | Noted: 2025-01-18

## 2025-01-18 LAB
ALBUMIN SERPL BCP-MCNC: 3.9 G/DL (ref 3.4–5)
ALBUMIN SERPL BCP-MCNC: 4.1 G/DL (ref 3.4–5)
ALP SERPL-CCNC: 64 U/L (ref 33–136)
ALP SERPL-CCNC: 69 U/L (ref 33–136)
ALT SERPL W P-5'-P-CCNC: 11 U/L (ref 7–45)
ALT SERPL W P-5'-P-CCNC: 9 U/L (ref 7–45)
ANION GAP SERPL CALC-SCNC: 12 MMOL/L (ref 10–20)
ANION GAP SERPL CALC-SCNC: 13 MMOL/L (ref 10–20)
AST SERPL W P-5'-P-CCNC: 17 U/L (ref 9–39)
AST SERPL W P-5'-P-CCNC: 20 U/L (ref 9–39)
ATRIAL RATE: 98 BPM
BASOPHILS # BLD AUTO: 0.05 X10*3/UL (ref 0–0.1)
BASOPHILS # BLD AUTO: 0.06 X10*3/UL (ref 0–0.1)
BASOPHILS NFR BLD AUTO: 0.5 %
BASOPHILS NFR BLD AUTO: 0.6 %
BILIRUB SERPL-MCNC: 0.2 MG/DL (ref 0–1.2)
BILIRUB SERPL-MCNC: 0.4 MG/DL (ref 0–1.2)
BUN SERPL-MCNC: 20 MG/DL (ref 6–23)
BUN SERPL-MCNC: 22 MG/DL (ref 6–23)
CALCIUM SERPL-MCNC: 8.2 MG/DL (ref 8.6–10.3)
CALCIUM SERPL-MCNC: 8.5 MG/DL (ref 8.6–10.3)
CARDIAC TROPONIN I PNL SERPL HS: 13 NG/L (ref 0–13)
CHLORIDE SERPL-SCNC: 90 MMOL/L (ref 98–107)
CHLORIDE SERPL-SCNC: 90 MMOL/L (ref 98–107)
CO2 SERPL-SCNC: 34 MMOL/L (ref 21–32)
CO2 SERPL-SCNC: 40 MMOL/L (ref 21–32)
CREAT SERPL-MCNC: 1.3 MG/DL (ref 0.5–1.05)
CREAT SERPL-MCNC: 1.58 MG/DL (ref 0.5–1.05)
EGFRCR SERPLBLD CKD-EPI 2021: 35 ML/MIN/1.73M*2
EGFRCR SERPLBLD CKD-EPI 2021: 45 ML/MIN/1.73M*2
EOSINOPHIL # BLD AUTO: 0.1 X10*3/UL (ref 0–0.7)
EOSINOPHIL # BLD AUTO: 0.14 X10*3/UL (ref 0–0.7)
EOSINOPHIL NFR BLD AUTO: 1.1 %
EOSINOPHIL NFR BLD AUTO: 1.3 %
ERYTHROCYTE [DISTWIDTH] IN BLOOD BY AUTOMATED COUNT: 12.3 % (ref 11.5–14.5)
ERYTHROCYTE [DISTWIDTH] IN BLOOD BY AUTOMATED COUNT: 12.5 % (ref 11.5–14.5)
ERYTHROCYTE [SEDIMENTATION RATE] IN BLOOD BY WESTERGREN METHOD: 27 MM/H (ref 0–30)
GLUCOSE SERPL-MCNC: 92 MG/DL (ref 74–99)
GLUCOSE SERPL-MCNC: 96 MG/DL (ref 74–99)
HCT VFR BLD AUTO: 38.4 % (ref 36–46)
HCT VFR BLD AUTO: 40.2 % (ref 36–46)
HGB BLD-MCNC: 12.8 G/DL (ref 12–16)
HGB BLD-MCNC: 13.2 G/DL (ref 12–16)
HOLD SPECIMEN: NORMAL
HOLD SPECIMEN: NORMAL
IMM GRANULOCYTES # BLD AUTO: 0.15 X10*3/UL (ref 0–0.7)
IMM GRANULOCYTES # BLD AUTO: 0.2 X10*3/UL (ref 0–0.7)
IMM GRANULOCYTES NFR BLD AUTO: 1.6 % (ref 0–0.9)
IMM GRANULOCYTES NFR BLD AUTO: 1.9 % (ref 0–0.9)
LACTATE SERPL-SCNC: 2.4 MMOL/L (ref 0.4–2)
LIPASE SERPL-CCNC: 126 U/L (ref 9–82)
LYMPHOCYTES # BLD AUTO: 1.72 X10*3/UL (ref 1.2–4.8)
LYMPHOCYTES # BLD AUTO: 2.14 X10*3/UL (ref 1.2–4.8)
LYMPHOCYTES NFR BLD AUTO: 18.1 %
LYMPHOCYTES NFR BLD AUTO: 20.4 %
MAGNESIUM SERPL-MCNC: 1.72 MG/DL (ref 1.6–2.4)
MCH RBC QN AUTO: 29.6 PG (ref 26–34)
MCH RBC QN AUTO: 29.7 PG (ref 26–34)
MCHC RBC AUTO-ENTMCNC: 32.8 G/DL (ref 32–36)
MCHC RBC AUTO-ENTMCNC: 33.3 G/DL (ref 32–36)
MCV RBC AUTO: 89 FL (ref 80–100)
MCV RBC AUTO: 91 FL (ref 80–100)
MONOCYTES # BLD AUTO: 0.67 X10*3/UL (ref 0.1–1)
MONOCYTES # BLD AUTO: 0.73 X10*3/UL (ref 0.1–1)
MONOCYTES NFR BLD AUTO: 6.4 %
MONOCYTES NFR BLD AUTO: 7.7 %
NEUTROPHILS # BLD AUTO: 6.77 X10*3/UL (ref 1.2–7.7)
NEUTROPHILS # BLD AUTO: 7.26 X10*3/UL (ref 1.2–7.7)
NEUTROPHILS NFR BLD AUTO: 69.4 %
NEUTROPHILS NFR BLD AUTO: 71 %
NRBC BLD-RTO: 0 /100 WBCS (ref 0–0)
NRBC BLD-RTO: 0 /100 WBCS (ref 0–0)
P AXIS: 77 DEGREES
P OFFSET: 181 MS
P ONSET: 118 MS
PLATELET # BLD AUTO: 350 X10*3/UL (ref 150–450)
PLATELET # BLD AUTO: 373 X10*3/UL (ref 150–450)
POTASSIUM SERPL-SCNC: 2.3 MMOL/L (ref 3.5–5.3)
POTASSIUM SERPL-SCNC: 2.8 MMOL/L (ref 3.5–5.3)
PR INTERVAL: 204 MS
PROT SERPL-MCNC: 6.5 G/DL (ref 6.4–8.2)
PROT SERPL-MCNC: 6.6 G/DL (ref 6.4–8.2)
Q ONSET: 220 MS
QRS COUNT: 16 BEATS
QRS DURATION: 72 MS
QT INTERVAL: 390 MS
QTC CALCULATION(BAZETT): 497 MS
QTC FREDERICIA: 459 MS
R AXIS: 76 DEGREES
RBC # BLD AUTO: 4.32 X10*6/UL (ref 4–5.2)
RBC # BLD AUTO: 4.44 X10*6/UL (ref 4–5.2)
SODIUM SERPL-SCNC: 134 MMOL/L (ref 136–145)
SODIUM SERPL-SCNC: 140 MMOL/L (ref 136–145)
T AXIS: 88 DEGREES
T OFFSET: 415 MS
VENTRICULAR RATE: 98 BPM
WBC # BLD AUTO: 10.5 X10*3/UL (ref 4.4–11.3)
WBC # BLD AUTO: 9.5 X10*3/UL (ref 4.4–11.3)

## 2025-01-18 PROCEDURE — 93005 ELECTROCARDIOGRAM TRACING: CPT

## 2025-01-18 PROCEDURE — 2550000001 HC RX 255 CONTRASTS: Performed by: EMERGENCY MEDICINE

## 2025-01-18 PROCEDURE — 85025 COMPLETE CBC W/AUTO DIFF WBC: CPT

## 2025-01-18 PROCEDURE — 74177 CT ABD & PELVIS W/CONTRAST: CPT | Performed by: RADIOLOGY

## 2025-01-18 PROCEDURE — 96365 THER/PROPH/DIAG IV INF INIT: CPT

## 2025-01-18 PROCEDURE — 84484 ASSAY OF TROPONIN QUANT: CPT

## 2025-01-18 PROCEDURE — 83735 ASSAY OF MAGNESIUM: CPT

## 2025-01-18 PROCEDURE — 83993 ASSAY FOR CALPROTECTIN FECAL: CPT

## 2025-01-18 PROCEDURE — 76705 ECHO EXAM OF ABDOMEN: CPT

## 2025-01-18 PROCEDURE — 87449 NOS EACH ORGANISM AG IA: CPT

## 2025-01-18 PROCEDURE — 71045 X-RAY EXAM CHEST 1 VIEW: CPT

## 2025-01-18 PROCEDURE — 76705 ECHO EXAM OF ABDOMEN: CPT | Performed by: STUDENT IN AN ORGANIZED HEALTH CARE EDUCATION/TRAINING PROGRAM

## 2025-01-18 PROCEDURE — 99285 EMERGENCY DEPT VISIT HI MDM: CPT | Mod: 25 | Performed by: EMERGENCY MEDICINE

## 2025-01-18 PROCEDURE — 96361 HYDRATE IV INFUSION ADD-ON: CPT

## 2025-01-18 PROCEDURE — 85652 RBC SED RATE AUTOMATED: CPT

## 2025-01-18 PROCEDURE — 71045 X-RAY EXAM CHEST 1 VIEW: CPT | Performed by: RADIOLOGY

## 2025-01-18 PROCEDURE — 74177 CT ABD & PELVIS W/CONTRAST: CPT

## 2025-01-18 PROCEDURE — 87506 IADNA-DNA/RNA PROBE TQ 6-11: CPT

## 2025-01-18 PROCEDURE — 83605 ASSAY OF LACTIC ACID: CPT

## 2025-01-18 PROCEDURE — 36415 COLL VENOUS BLD VENIPUNCTURE: CPT

## 2025-01-18 PROCEDURE — 87493 C DIFF AMPLIFIED PROBE: CPT

## 2025-01-18 PROCEDURE — 2500000004 HC RX 250 GENERAL PHARMACY W/ HCPCS (ALT 636 FOR OP/ED)

## 2025-01-18 PROCEDURE — 96366 THER/PROPH/DIAG IV INF ADDON: CPT

## 2025-01-18 PROCEDURE — 83690 ASSAY OF LIPASE: CPT

## 2025-01-18 PROCEDURE — 99223 1ST HOSP IP/OBS HIGH 75: CPT | Performed by: STUDENT IN AN ORGANIZED HEALTH CARE EDUCATION/TRAINING PROGRAM

## 2025-01-18 PROCEDURE — 80053 COMPREHEN METABOLIC PANEL: CPT

## 2025-01-18 PROCEDURE — 2500000001 HC RX 250 WO HCPCS SELF ADMINISTERED DRUGS (ALT 637 FOR MEDICARE OP): Performed by: STUDENT IN AN ORGANIZED HEALTH CARE EDUCATION/TRAINING PROGRAM

## 2025-01-18 RX ORDER — CALCIUM CARBONATE 200(500)MG
1000 TABLET,CHEWABLE ORAL ONCE
Status: COMPLETED | OUTPATIENT
Start: 2025-01-18 | End: 2025-01-18

## 2025-01-18 RX ORDER — POTASSIUM CHLORIDE 14.9 MG/ML
20 INJECTION INTRAVENOUS
Status: COMPLETED | OUTPATIENT
Start: 2025-01-18 | End: 2025-01-19

## 2025-01-18 RX ADMIN — POTASSIUM CHLORIDE 20 MEQ: 14.9 INJECTION, SOLUTION INTRAVENOUS at 22:51

## 2025-01-18 RX ADMIN — SODIUM CHLORIDE 1000 ML: 9 INJECTION, SOLUTION INTRAVENOUS at 18:50

## 2025-01-18 RX ADMIN — POTASSIUM CHLORIDE 20 MEQ: 14.9 INJECTION, SOLUTION INTRAVENOUS at 19:39

## 2025-01-18 RX ADMIN — ANTACID TABLETS 1000 MG: 500 TABLET, CHEWABLE ORAL at 23:46

## 2025-01-18 RX ADMIN — SODIUM CHLORIDE 1000 ML: 9 INJECTION, SOLUTION INTRAVENOUS at 22:13

## 2025-01-18 RX ADMIN — IOHEXOL 57 ML: 350 INJECTION, SOLUTION INTRAVENOUS at 19:14

## 2025-01-18 ASSESSMENT — PAIN SCALES - GENERAL
PAINLEVEL_OUTOF10: 0 - NO PAIN

## 2025-01-18 ASSESSMENT — COLUMBIA-SUICIDE SEVERITY RATING SCALE - C-SSRS
2. HAVE YOU ACTUALLY HAD ANY THOUGHTS OF KILLING YOURSELF?: NO
1. IN THE PAST MONTH, HAVE YOU WISHED YOU WERE DEAD OR WISHED YOU COULD GO TO SLEEP AND NOT WAKE UP?: NO
6. HAVE YOU EVER DONE ANYTHING, STARTED TO DO ANYTHING, OR PREPARED TO DO ANYTHING TO END YOUR LIFE?: NO

## 2025-01-18 ASSESSMENT — LIFESTYLE VARIABLES
HAVE PEOPLE ANNOYED YOU BY CRITICIZING YOUR DRINKING: NO
EVER FELT BAD OR GUILTY ABOUT YOUR DRINKING: NO
HAVE YOU EVER FELT YOU SHOULD CUT DOWN ON YOUR DRINKING: NO

## 2025-01-18 ASSESSMENT — PAIN DESCRIPTION - PROGRESSION: CLINICAL_PROGRESSION: NOT CHANGED

## 2025-01-18 ASSESSMENT — PAIN - FUNCTIONAL ASSESSMENT: PAIN_FUNCTIONAL_ASSESSMENT: 0-10

## 2025-01-18 NOTE — TELEPHONE ENCOUNTER
Lab had called regarding the critical results of the blood drawn today with potassium of 2.8.  Upon review of her previous result her potassium was 4.92 months ago.  She confirmed that she has been having diarrhea.  She was advised to go to the emergency room for potassium replacement and to recheck the lab as appropriate.  She agrees with the recommendation.  Initially she had indicated that she would like to wait another day but I advised that she should go to the as the potassium may drop further which will put her at great risk of complications of hypokalemia.  She agrees to go to the emergency room.

## 2025-01-18 NOTE — ED PROVIDER NOTES
HPI   Chief Complaint   Patient presents with    abnormal  labs     Pt was told to come to er for low potassium states diarrhea for 2 weeks       History provided by: Patient    Limitations to history: None    CC: Hypokalemia    HPI: 69-year-old female with a history of hypertension, hyperlipidemia, GERD, osteoporosis, COPD presents the emergency department to be evaluated for hyperkalemia.  Patient states for the last 2 weeks has been having intermittent abdominal cramping, nausea vomiting, and watery nonbloody diarrhea.  Denies history of C. difficile.  Denies recent traveling, recent antibiotic use, recent changes in water sources.  She reports any blood in her vomit.  Denies fever and chills.  Denies any other flulike symptoms.  Denies chest pain or shortness of breath.  She does have a history of COPD and takes breathing treatments as needed, states that her breathing has been doing well.  Denies history of CAD, CHF, PE or DVT.  Denies weakness and fatigue.  Denies lightheadedness or dizziness.  Denies blood in the urine or stool.  Denies any urinary complaints.  Patient followed up with her primary care provider today and had blood work performed as well as a stool study.  Patient was referred to the emergency department due to hypokalemia 2.8.  Patient denies any medications that any new medications of attribute to this.  Patient does not take any diuretics.  Denies all other systemic symptoms.  Patient was given medications for her diarrhea and nausea earlier today and states that she is already having improvement in her symptoms.    ROS: Negative unless mentioned in HPI    Medical Hx: Allergy to codeine.  Immunizations are up-to-date.    Physical exam:    Constitutional: Patient is well-nourished and well-developed.  Sitting comfortably in the room and in no distress.  Oriented to person, place, time, and situation.    HEENT: Head is normocephalic, atraumatic. Patient's airway is patent.  Tympanic membranes  are clear bilaterally.  Nasal mucosa clear.  Mouth with normal mucosa.  Throat is not erythematous and there are no oropharyngeal exudates, uvula is midline.  No obvious facial deformities.    Eyes: Clear bilaterally.  Pupils are equal round and reactive to light and accommodation.  Extraocular movements intact.      Cardiac: Tachycardia, regular rhythm.  Heart sounds S1, S2.  No murmurs, rubs, or gallops.  PMI nondisplaced.  No JVD.    Respiratory: Regular respiratory rate and effort.  Breath sounds are clear and equal bilaterally, no adventitious lung sounds.  Patient is speaking in full sentences and is in no apparent respiratory distress. No use of accessory muscles.      Gastrointestinal: Abdomen is soft, nondistended, and nontender.  There are no obvious deformities.  No rebound tenderness or guarding.  Bowel sounds are normal active.    Genitourinary: No CVA or flank tenderness.    Musculoskeletal: No reproducible tenderness.  No obvious skin or bony deformities.  Patient has equal range of motion in all extremities and no strength deficiencies.  No muscle or joint tenderness. No back or neck tenderness.  Capillary refill less than 3 seconds.  Strong peripheral pulses.  No sensory deficits.    Neurological: Patient is alert and oriented.  No focal deficits.  5/5 strength in all extremities.  Cranial nerves II through XII intact. GCS15.     Skin: Skin is normal color for race and is warm, dry, and intact.  No evidence of trauma.  No lesions, rashes, bruising, jaundice, or masses.    Psych: Appropriate mood and affect.  No apparent risk to self or others.    Heme/lymph: No adenopathy, lymphadenopathy, or splenomegaly    Physical exam is otherwise negative unless stated above or in history of present illness.              Patient History   Past Medical History:   Diagnosis Date    Acute upper respiratory infection, unspecified 09/05/2018    URI, acute    Arthritis     Cataract     Chronic obstructive pulmonary  disease, unspecified 11/15/2022    COPD (chronic obstructive pulmonary disease)    Essential (primary) hypertension 11/15/2022    Hypertension    Gastro-esophageal reflux disease without esophagitis 11/15/2022    GERD (gastroesophageal reflux disease)    Osteoporosis     Shortness of breath     states being evaluated for oxygen use    Wears glasses      Past Surgical History:   Procedure Laterality Date    APPENDECTOMY      INCISIONAL BREAST BIOPSY  10/03/2015    Incisional Breast Biopsy-right breast    TONSILLECTOMY      TUBAL LIGATION       Family History   Problem Relation Name Age of Onset    Breast cancer Sister      Breast cancer Father's Sister       Social History     Tobacco Use    Smoking status: Former     Current packs/day: 1.00     Average packs/day: 1 pack/day for 38.0 years (38.0 ttl pk-yrs)     Types: Cigarettes     Start date: 1987    Smokeless tobacco: Never   Vaping Use    Vaping status: Never Used   Substance Use Topics    Alcohol use: Yes     Comment: not recently    Drug use: Never       Physical Exam   ED Triage Vitals [01/18/25 1834]   Temperature Heart Rate Respirations BP   36.5 °C (97.7 °F) (!) 115 16 121/74      Pulse Ox Temp src Heart Rate Source Patient Position   (!) 93 % -- -- --      BP Location FiO2 (%)     -- --       Physical Exam      ED Course & MDM   Diagnoses as of 01/19/25 0007   Dehydration   Hypokalemia   YADIRA (acute kidney injury) (CMS-HCC)     Patient updated on plan for lab testing, IV insertion, radiology imaging, and medications to be administered while in the ER (if indicated). Patient updated on expected wait times for testing and results. Patient provided my name and told to ask any staff member for questions or concerns if they should arise. Electronic medical record reviewed.     MDM    Upon initial assessment, patient was healthy non-toxic appearing and in no apparent distress.     Patient presented to the emergency department with the chief complaint abdominal  pain with nausea, vomiting, diarrhea.  Abdomen is soft, nontender, nondistended.  on arrival to the emergency department, vital signs were  sitting for tachycardia.    I do believe the patient's tachycardia is likely from dehydration from her vomiting and diarrhea.  She will be given a liter of IV normal saline.  Will obtain basic blood work, EKG and troponin, lactate and lipase, magnesium, CT abdomen and pelvis given the intermittent abdominal pain, chest x-ray.    I do believe the patient's hypokalemia is likely from her vomiting and diarrhea.    EKG was performed at 1921 interpreted by me.  Normal sinus rhythm 98 beats minute.  Normal axis.  No ST elevation or depression.  No prolonged QT.    Magnesium is 1.72.  Lactate before fluids is 2.4.  Lipase is 126.  Troponin is 13 making ACS unlikely.  CBC reveals no leukocytosis or anemia.  CMP reveals a moderate acute kidney injury the creatinine of 1.58.  Calcium is 8.2.  Patient's potassium is 2.3, she was given IV potassium.  Chest x-ray reveals no pneumonia and CT abdomen and pelvis reveals    Colitis and diverticulosis without diverticulitis.  Patient also has cholelithiasis without signs of cholecystitis, cholangitis, choledocholithiasis.  Ultrasound confirmed no sign of cholecystitis.  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    At this time I do believe the patient benefit from hospitalization for IV hydration and potassium replacement.  I spoke to the hospitalist who is agreeable this plan.  Patient remained hemodynamically stable in the ER.        This note was dictated using a speech recognition program.  While an attempt was made at proof-reading to minimize errors, minor errors in transcription may be present            No data recorded     Yvette Coma Scale Score: 15 (01/18/25 1835 : Marisa Kwan RN)                           Medical Decision Making      Procedure  Procedures      Umesh Fair PA-C  01/18/25 2333       Umesh Fair PA-C  01/19/25 0007

## 2025-01-19 ENCOUNTER — APPOINTMENT (OUTPATIENT)
Dept: CARDIOLOGY | Facility: HOSPITAL | Age: 70
End: 2025-01-19
Payer: MEDICARE

## 2025-01-19 VITALS
DIASTOLIC BLOOD PRESSURE: 67 MMHG | HEART RATE: 93 BPM | SYSTOLIC BLOOD PRESSURE: 139 MMHG | OXYGEN SATURATION: 100 % | BODY MASS INDEX: 18.13 KG/M2 | HEIGHT: 59 IN | TEMPERATURE: 98.4 F | RESPIRATION RATE: 20 BRPM | WEIGHT: 89.95 LBS

## 2025-01-19 PROBLEM — E87.20 LACTIC ACIDOSIS: Status: ACTIVE | Noted: 2025-01-19

## 2025-01-19 LAB
ANION GAP SERPL CALC-SCNC: 10 MMOL/L (ref 10–20)
APPEARANCE UR: CLEAR
ATRIAL RATE: 103 BPM
BILIRUB UR STRIP.AUTO-MCNC: NEGATIVE MG/DL
BUN SERPL-MCNC: 13 MG/DL (ref 6–23)
C DIF TOX TCDA+TCDB STL QL NAA+PROBE: DETECTED
CALCIUM SERPL-MCNC: 7.5 MG/DL (ref 8.6–10.3)
CHLORIDE SERPL-SCNC: 103 MMOL/L (ref 98–107)
CO2 SERPL-SCNC: 31 MMOL/L (ref 21–32)
COLOR UR: COLORLESS
CREAT SERPL-MCNC: 0.93 MG/DL (ref 0.5–1.05)
EGFRCR SERPLBLD CKD-EPI 2021: 67 ML/MIN/1.73M*2
ERYTHROCYTE [DISTWIDTH] IN BLOOD BY AUTOMATED COUNT: 12.6 % (ref 11.5–14.5)
GLUCOSE SERPL-MCNC: 81 MG/DL (ref 74–99)
GLUCOSE UR STRIP.AUTO-MCNC: NORMAL MG/DL
HCT VFR BLD AUTO: 35 % (ref 36–46)
HGB BLD-MCNC: 11.3 G/DL (ref 12–16)
HOLD SPECIMEN: NORMAL
HOLD SPECIMEN: NORMAL
KETONES UR STRIP.AUTO-MCNC: NEGATIVE MG/DL
LACTATE SERPL-SCNC: 1.6 MMOL/L (ref 0.4–2)
LEUKOCYTE ESTERASE UR QL STRIP.AUTO: NEGATIVE
MAGNESIUM SERPL-MCNC: 1.42 MG/DL (ref 1.6–2.4)
MCH RBC QN AUTO: 29.6 PG (ref 26–34)
MCHC RBC AUTO-ENTMCNC: 32.3 G/DL (ref 32–36)
MCV RBC AUTO: 92 FL (ref 80–100)
NITRITE UR QL STRIP.AUTO: NEGATIVE
NRBC BLD-RTO: 0 /100 WBCS (ref 0–0)
P AXIS: 78 DEGREES
P OFFSET: 185 MS
P ONSET: 150 MS
PH UR STRIP.AUTO: 6 [PH]
PLATELET # BLD AUTO: 226 X10*3/UL (ref 150–450)
POTASSIUM SERPL-SCNC: 2.8 MMOL/L (ref 3.5–5.3)
PR INTERVAL: 142 MS
PROT UR STRIP.AUTO-MCNC: NEGATIVE MG/DL
Q ONSET: 221 MS
QRS COUNT: 17 BEATS
QRS DURATION: 74 MS
QT INTERVAL: 382 MS
QTC CALCULATION(BAZETT): 500 MS
QTC FREDERICIA: 457 MS
R AXIS: 69 DEGREES
RBC # BLD AUTO: 3.82 X10*6/UL (ref 4–5.2)
RBC # UR STRIP.AUTO: NEGATIVE /UL
SODIUM SERPL-SCNC: 141 MMOL/L (ref 136–145)
SP GR UR STRIP.AUTO: 1.02
T AXIS: 67 DEGREES
T OFFSET: 412 MS
UROBILINOGEN UR STRIP.AUTO-MCNC: NORMAL MG/DL
VENTRICULAR RATE: 103 BPM
WBC # BLD AUTO: 7.8 X10*3/UL (ref 4.4–11.3)

## 2025-01-19 PROCEDURE — 93005 ELECTROCARDIOGRAM TRACING: CPT

## 2025-01-19 PROCEDURE — 36415 COLL VENOUS BLD VENIPUNCTURE: CPT | Performed by: STUDENT IN AN ORGANIZED HEALTH CARE EDUCATION/TRAINING PROGRAM

## 2025-01-19 PROCEDURE — 83735 ASSAY OF MAGNESIUM: CPT | Performed by: STUDENT IN AN ORGANIZED HEALTH CARE EDUCATION/TRAINING PROGRAM

## 2025-01-19 PROCEDURE — 2500000001 HC RX 250 WO HCPCS SELF ADMINISTERED DRUGS (ALT 637 FOR MEDICARE OP): Performed by: STUDENT IN AN ORGANIZED HEALTH CARE EDUCATION/TRAINING PROGRAM

## 2025-01-19 PROCEDURE — 81003 URINALYSIS AUTO W/O SCOPE: CPT

## 2025-01-19 PROCEDURE — 2500000002 HC RX 250 W HCPCS SELF ADMINISTERED DRUGS (ALT 637 FOR MEDICARE OP, ALT 636 FOR OP/ED): Performed by: STUDENT IN AN ORGANIZED HEALTH CARE EDUCATION/TRAINING PROGRAM

## 2025-01-19 PROCEDURE — 85027 COMPLETE CBC AUTOMATED: CPT | Performed by: STUDENT IN AN ORGANIZED HEALTH CARE EDUCATION/TRAINING PROGRAM

## 2025-01-19 PROCEDURE — 83605 ASSAY OF LACTIC ACID: CPT | Performed by: STUDENT IN AN ORGANIZED HEALTH CARE EDUCATION/TRAINING PROGRAM

## 2025-01-19 PROCEDURE — 2500000004 HC RX 250 GENERAL PHARMACY W/ HCPCS (ALT 636 FOR OP/ED): Performed by: STUDENT IN AN ORGANIZED HEALTH CARE EDUCATION/TRAINING PROGRAM

## 2025-01-19 PROCEDURE — 80048 BASIC METABOLIC PNL TOTAL CA: CPT | Performed by: STUDENT IN AN ORGANIZED HEALTH CARE EDUCATION/TRAINING PROGRAM

## 2025-01-19 PROCEDURE — 99233 SBSQ HOSP IP/OBS HIGH 50: CPT | Performed by: STUDENT IN AN ORGANIZED HEALTH CARE EDUCATION/TRAINING PROGRAM

## 2025-01-19 PROCEDURE — 1210000001 HC SEMI-PRIVATE ROOM DAILY

## 2025-01-19 RX ORDER — ALBUTEROL SULFATE 90 UG/1
2 INHALANT RESPIRATORY (INHALATION) EVERY 4 HOURS PRN
Status: DISCONTINUED | OUTPATIENT
Start: 2025-01-19 | End: 2025-01-21 | Stop reason: HOSPADM

## 2025-01-19 RX ORDER — MAGNESIUM SULFATE HEPTAHYDRATE 40 MG/ML
2 INJECTION, SOLUTION INTRAVENOUS ONCE
Status: COMPLETED | OUTPATIENT
Start: 2025-01-19 | End: 2025-01-19

## 2025-01-19 RX ORDER — POLYETHYLENE GLYCOL 3350 17 G/17G
17 POWDER, FOR SOLUTION ORAL DAILY PRN
Status: DISCONTINUED | OUTPATIENT
Start: 2025-01-19 | End: 2025-01-21 | Stop reason: HOSPADM

## 2025-01-19 RX ORDER — ONDANSETRON HYDROCHLORIDE 2 MG/ML
4 INJECTION, SOLUTION INTRAVENOUS EVERY 8 HOURS PRN
Status: DISCONTINUED | OUTPATIENT
Start: 2025-01-19 | End: 2025-01-21 | Stop reason: HOSPADM

## 2025-01-19 RX ORDER — ONDANSETRON 4 MG/1
4 TABLET, FILM COATED ORAL EVERY 8 HOURS PRN
Status: DISCONTINUED | OUTPATIENT
Start: 2025-01-19 | End: 2025-01-21 | Stop reason: HOSPADM

## 2025-01-19 RX ORDER — VANCOMYCIN HYDROCHLORIDE 125 MG/1
125 CAPSULE ORAL 4 TIMES DAILY
Status: DISCONTINUED | OUTPATIENT
Start: 2025-01-19 | End: 2025-01-21 | Stop reason: HOSPADM

## 2025-01-19 RX ORDER — AMLODIPINE BESYLATE 5 MG/1
5 TABLET ORAL DAILY
Status: DISCONTINUED | OUTPATIENT
Start: 2025-01-19 | End: 2025-01-21 | Stop reason: HOSPADM

## 2025-01-19 RX ORDER — ATENOLOL 50 MG/1
50 TABLET ORAL DAILY
Status: DISCONTINUED | OUTPATIENT
Start: 2025-01-19 | End: 2025-01-21 | Stop reason: HOSPADM

## 2025-01-19 RX ORDER — METOCLOPRAMIDE 10 MG/1
5 TABLET ORAL EVERY 6 HOURS PRN
Status: DISCONTINUED | OUTPATIENT
Start: 2025-01-19 | End: 2025-01-21 | Stop reason: HOSPADM

## 2025-01-19 RX ORDER — ACETAMINOPHEN 160 MG/5ML
650 SOLUTION ORAL EVERY 4 HOURS PRN
Status: DISCONTINUED | OUTPATIENT
Start: 2025-01-19 | End: 2025-01-21 | Stop reason: HOSPADM

## 2025-01-19 RX ORDER — ACETAMINOPHEN 325 MG/1
650 TABLET ORAL EVERY 4 HOURS PRN
Status: DISCONTINUED | OUTPATIENT
Start: 2025-01-19 | End: 2025-01-21 | Stop reason: HOSPADM

## 2025-01-19 RX ORDER — CHOLECALCIFEROL (VITAMIN D3) 25 MCG
1000 TABLET ORAL DAILY
Status: DISCONTINUED | OUTPATIENT
Start: 2025-01-19 | End: 2025-01-21 | Stop reason: HOSPADM

## 2025-01-19 RX ORDER — CALCIUM CARBONATE 200(500)MG
500 TABLET,CHEWABLE ORAL 2 TIMES DAILY
Status: DISCONTINUED | OUTPATIENT
Start: 2025-01-19 | End: 2025-01-21 | Stop reason: HOSPADM

## 2025-01-19 RX ORDER — PANTOPRAZOLE SODIUM 40 MG/1
40 TABLET, DELAYED RELEASE ORAL DAILY
Status: DISCONTINUED | OUTPATIENT
Start: 2025-01-19 | End: 2025-01-21 | Stop reason: HOSPADM

## 2025-01-19 RX ORDER — TALC
3 POWDER (GRAM) TOPICAL NIGHTLY PRN
Status: DISCONTINUED | OUTPATIENT
Start: 2025-01-19 | End: 2025-01-21 | Stop reason: HOSPADM

## 2025-01-19 RX ORDER — SODIUM CHLORIDE, SODIUM LACTATE, POTASSIUM CHLORIDE, CALCIUM CHLORIDE 600; 310; 30; 20 MG/100ML; MG/100ML; MG/100ML; MG/100ML
83.3 INJECTION, SOLUTION INTRAVENOUS CONTINUOUS
Status: ACTIVE | OUTPATIENT
Start: 2025-01-19 | End: 2025-01-19

## 2025-01-19 RX ORDER — POTASSIUM CHLORIDE 1.5 G/1.58G
40 POWDER, FOR SOLUTION ORAL ONCE
Status: COMPLETED | OUTPATIENT
Start: 2025-01-19 | End: 2025-01-19

## 2025-01-19 RX ORDER — LANOLIN ALCOHOL/MO/W.PET/CERES
400 CREAM (GRAM) TOPICAL DAILY
Status: DISCONTINUED | OUTPATIENT
Start: 2025-01-19 | End: 2025-01-21 | Stop reason: HOSPADM

## 2025-01-19 RX ORDER — ACETAMINOPHEN 650 MG/1
650 SUPPOSITORY RECTAL EVERY 4 HOURS PRN
Status: DISCONTINUED | OUTPATIENT
Start: 2025-01-19 | End: 2025-01-21 | Stop reason: HOSPADM

## 2025-01-19 RX ORDER — METOCLOPRAMIDE HYDROCHLORIDE 5 MG/ML
5 INJECTION INTRAMUSCULAR; INTRAVENOUS EVERY 6 HOURS PRN
Status: DISCONTINUED | OUTPATIENT
Start: 2025-01-19 | End: 2025-01-21 | Stop reason: HOSPADM

## 2025-01-19 RX ADMIN — Medication 1000 UNITS: at 08:34

## 2025-01-19 RX ADMIN — VANCOMYCIN HYDROCHLORIDE 125 MG: 125 CAPSULE ORAL at 13:22

## 2025-01-19 RX ADMIN — VANCOMYCIN HYDROCHLORIDE 125 MG: 125 CAPSULE ORAL at 11:10

## 2025-01-19 RX ADMIN — POTASSIUM CHLORIDE 40 MEQ: 1.5 POWDER, FOR SOLUTION ORAL at 09:40

## 2025-01-19 RX ADMIN — SODIUM CHLORIDE, POTASSIUM CHLORIDE, SODIUM LACTATE AND CALCIUM CHLORIDE 500 ML: 600; 310; 30; 20 INJECTION, SOLUTION INTRAVENOUS at 08:36

## 2025-01-19 RX ADMIN — VANCOMYCIN HYDROCHLORIDE 125 MG: 125 CAPSULE ORAL at 17:34

## 2025-01-19 RX ADMIN — PANTOPRAZOLE SODIUM 40 MG: 40 TABLET, DELAYED RELEASE ORAL at 06:25

## 2025-01-19 RX ADMIN — SODIUM CHLORIDE, POTASSIUM CHLORIDE, SODIUM LACTATE AND CALCIUM CHLORIDE 83.3 ML/HR: 600; 310; 30; 20 INJECTION, SOLUTION INTRAVENOUS at 01:25

## 2025-01-19 RX ADMIN — MAGNESIUM OXIDE 400 MG (241.3 MG MAGNESIUM) TABLET 400 MG: TABLET at 08:34

## 2025-01-19 RX ADMIN — VANCOMYCIN HYDROCHLORIDE 125 MG: 125 CAPSULE ORAL at 21:14

## 2025-01-19 RX ADMIN — ANTACID TABLETS 500 MG: 500 TABLET, CHEWABLE ORAL at 08:34

## 2025-01-19 RX ADMIN — MAGNESIUM SULFATE HEPTAHYDRATE 2 G: 40 INJECTION, SOLUTION INTRAVENOUS at 13:22

## 2025-01-19 RX ADMIN — ANTACID TABLETS 500 MG: 500 TABLET, CHEWABLE ORAL at 21:15

## 2025-01-19 SDOH — SOCIAL STABILITY: SOCIAL INSECURITY: WITHIN THE LAST YEAR, HAVE YOU BEEN AFRAID OF YOUR PARTNER OR EX-PARTNER?: NO

## 2025-01-19 SDOH — ECONOMIC STABILITY: TRANSPORTATION INSECURITY: IN THE PAST 12 MONTHS, HAS LACK OF TRANSPORTATION KEPT YOU FROM MEDICAL APPOINTMENTS OR FROM GETTING MEDICATIONS?: NO

## 2025-01-19 SDOH — ECONOMIC STABILITY: FOOD INSECURITY: HOW HARD IS IT FOR YOU TO PAY FOR THE VERY BASICS LIKE FOOD, HOUSING, MEDICAL CARE, AND HEATING?: NOT VERY HARD

## 2025-01-19 SDOH — SOCIAL STABILITY: SOCIAL INSECURITY: ARE YOU OR HAVE YOU BEEN THREATENED OR ABUSED PHYSICALLY, EMOTIONALLY, OR SEXUALLY BY ANYONE?: NO

## 2025-01-19 SDOH — ECONOMIC STABILITY: FOOD INSECURITY: WITHIN THE PAST 12 MONTHS, THE FOOD YOU BOUGHT JUST DIDN'T LAST AND YOU DIDN'T HAVE MONEY TO GET MORE.: NEVER TRUE

## 2025-01-19 SDOH — SOCIAL STABILITY: SOCIAL INSECURITY
WITHIN THE LAST YEAR, HAVE YOU BEEN RAPED OR FORCED TO HAVE ANY KIND OF SEXUAL ACTIVITY BY YOUR PARTNER OR EX-PARTNER?: NO

## 2025-01-19 SDOH — SOCIAL STABILITY: SOCIAL INSECURITY: HAVE YOU HAD THOUGHTS OF HARMING ANYONE ELSE?: NO

## 2025-01-19 SDOH — SOCIAL STABILITY: SOCIAL INSECURITY: DOES ANYONE TRY TO KEEP YOU FROM HAVING/CONTACTING OTHER FRIENDS OR DOING THINGS OUTSIDE YOUR HOME?: NO

## 2025-01-19 SDOH — SOCIAL STABILITY: SOCIAL INSECURITY
WITHIN THE LAST YEAR, HAVE YOU BEEN KICKED, HIT, SLAPPED, OR OTHERWISE PHYSICALLY HURT BY YOUR PARTNER OR EX-PARTNER?: NO

## 2025-01-19 SDOH — ECONOMIC STABILITY: HOUSING INSECURITY: AT ANY TIME IN THE PAST 12 MONTHS, WERE YOU HOMELESS OR LIVING IN A SHELTER (INCLUDING NOW)?: NO

## 2025-01-19 SDOH — ECONOMIC STABILITY: HOUSING INSECURITY: IN THE PAST 12 MONTHS, HOW MANY TIMES HAVE YOU MOVED WHERE YOU WERE LIVING?: 0

## 2025-01-19 SDOH — SOCIAL STABILITY: SOCIAL INSECURITY: HAVE YOU HAD ANY THOUGHTS OF HARMING ANYONE ELSE?: NO

## 2025-01-19 SDOH — SOCIAL STABILITY: SOCIAL INSECURITY: WITHIN THE LAST YEAR, HAVE YOU BEEN HUMILIATED OR EMOTIONALLY ABUSED IN OTHER WAYS BY YOUR PARTNER OR EX-PARTNER?: NO

## 2025-01-19 SDOH — ECONOMIC STABILITY: INCOME INSECURITY: IN THE PAST 12 MONTHS HAS THE ELECTRIC, GAS, OIL, OR WATER COMPANY THREATENED TO SHUT OFF SERVICES IN YOUR HOME?: NO

## 2025-01-19 SDOH — ECONOMIC STABILITY: FOOD INSECURITY: WITHIN THE PAST 12 MONTHS, YOU WORRIED THAT YOUR FOOD WOULD RUN OUT BEFORE YOU GOT THE MONEY TO BUY MORE.: NEVER TRUE

## 2025-01-19 SDOH — ECONOMIC STABILITY: HOUSING INSECURITY: IN THE LAST 12 MONTHS, WAS THERE A TIME WHEN YOU WERE NOT ABLE TO PAY THE MORTGAGE OR RENT ON TIME?: NO

## 2025-01-19 SDOH — SOCIAL STABILITY: SOCIAL INSECURITY: DO YOU FEEL ANYONE HAS EXPLOITED OR TAKEN ADVANTAGE OF YOU FINANCIALLY OR OF YOUR PERSONAL PROPERTY?: NO

## 2025-01-19 SDOH — SOCIAL STABILITY: SOCIAL INSECURITY: DO YOU FEEL UNSAFE GOING BACK TO THE PLACE WHERE YOU ARE LIVING?: NO

## 2025-01-19 SDOH — SOCIAL STABILITY: SOCIAL INSECURITY: ABUSE: ADULT

## 2025-01-19 SDOH — SOCIAL STABILITY: SOCIAL INSECURITY: ARE THERE ANY APPARENT SIGNS OF INJURIES/BEHAVIORS THAT COULD BE RELATED TO ABUSE/NEGLECT?: NO

## 2025-01-19 SDOH — SOCIAL STABILITY: SOCIAL INSECURITY: HAS ANYONE EVER THREATENED TO HURT YOUR FAMILY OR YOUR PETS?: NO

## 2025-01-19 SDOH — SOCIAL STABILITY: SOCIAL INSECURITY: WERE YOU ABLE TO COMPLETE ALL THE BEHAVIORAL HEALTH SCREENINGS?: YES

## 2025-01-19 ASSESSMENT — COGNITIVE AND FUNCTIONAL STATUS - GENERAL
MOBILITY SCORE: 24
DAILY ACTIVITIY SCORE: 24
DAILY ACTIVITIY SCORE: 24
PATIENT BASELINE BEDBOUND: NO
MOBILITY SCORE: 24
MOBILITY SCORE: 24

## 2025-01-19 ASSESSMENT — COLUMBIA-SUICIDE SEVERITY RATING SCALE - C-SSRS
6. HAVE YOU EVER DONE ANYTHING, STARTED TO DO ANYTHING, OR PREPARED TO DO ANYTHING TO END YOUR LIFE?: NO
2. HAVE YOU ACTUALLY HAD ANY THOUGHTS OF KILLING YOURSELF?: NO
1. IN THE PAST MONTH, HAVE YOU WISHED YOU WERE DEAD OR WISHED YOU COULD GO TO SLEEP AND NOT WAKE UP?: NO

## 2025-01-19 ASSESSMENT — PATIENT HEALTH QUESTIONNAIRE - PHQ9
1. LITTLE INTEREST OR PLEASURE IN DOING THINGS: NOT AT ALL
SUM OF ALL RESPONSES TO PHQ9 QUESTIONS 1 & 2: 0
1. LITTLE INTEREST OR PLEASURE IN DOING THINGS: NOT AT ALL
2. FEELING DOWN, DEPRESSED OR HOPELESS: NOT AT ALL
SUM OF ALL RESPONSES TO PHQ9 QUESTIONS 1 & 2: 0
2. FEELING DOWN, DEPRESSED OR HOPELESS: NOT AT ALL

## 2025-01-19 ASSESSMENT — ACTIVITIES OF DAILY LIVING (ADL)
HEARING - RIGHT EAR: FUNCTIONAL
BATHING: INDEPENDENT
GROOMING: INDEPENDENT
DRESSING YOURSELF: INDEPENDENT
WALKS IN HOME: INDEPENDENT
FEEDING YOURSELF: INDEPENDENT
LACK_OF_TRANSPORTATION: NO
ADEQUATE_TO_COMPLETE_ADL: YES
PATIENT'S MEMORY ADEQUATE TO SAFELY COMPLETE DAILY ACTIVITIES?: YES
JUDGMENT_ADEQUATE_SAFELY_COMPLETE_DAILY_ACTIVITIES: YES
HEARING - LEFT EAR: FUNCTIONAL
LACK_OF_TRANSPORTATION: NO
TOILETING: INDEPENDENT
ASSISTIVE_DEVICE: WALKER;EYEGLASSES;DENTURES PARTIAL

## 2025-01-19 ASSESSMENT — LIFESTYLE VARIABLES
HOW MANY STANDARD DRINKS CONTAINING ALCOHOL DO YOU HAVE ON A TYPICAL DAY: PATIENT DOES NOT DRINK
HOW OFTEN DO YOU HAVE 6 OR MORE DRINKS ON ONE OCCASION: NEVER
HOW OFTEN DO YOU HAVE A DRINK CONTAINING ALCOHOL: NEVER
AUDIT-C TOTAL SCORE: 0
SUBSTANCE_ABUSE_PAST_12_MONTHS: NO
AUDIT-C TOTAL SCORE: 0
SKIP TO QUESTIONS 9-10: 1
PRESCIPTION_ABUSE_PAST_12_MONTHS: NO

## 2025-01-19 ASSESSMENT — PAIN SCALES - GENERAL
PAINLEVEL_OUTOF10: 0 - NO PAIN

## 2025-01-19 NOTE — CARE PLAN
The patient's goals for the shift include      The clinical goals for the shift include      Over the shift, the patient did not make progress toward the following goals. Barriers to progression include . Recommendations to address these barriers include   Problem: Pain - Adult  Goal: Verbalizes/displays adequate comfort level or baseline comfort level  Outcome: Progressing     Problem: Safety - Adult  Goal: Free from fall injury  Outcome: Progressing     Problem: Discharge Planning  Goal: Discharge to home or other facility with appropriate resources  Outcome: Progressing     Problem: Chronic Conditions and Co-morbidities  Goal: Patient's chronic conditions and co-morbidity symptoms are monitored and maintained or improved  Outcome: Progressing     Problem: Fall/Injury  Goal: Not fall by end of shift  Outcome: Progressing  Goal: Be free from injury by end of the shift  Outcome: Progressing  Goal: Verbalize understanding of personal risk factors for fall in the hospital  Outcome: Progressing  Goal: Verbalize understanding of risk factor reduction measures to prevent injury from fall in the home  Outcome: Progressing  Goal: Use assistive devices by end of the shift  Outcome: Progressing  Goal: Pace activities to prevent fatigue by end of the shift  Outcome: Progressing   .

## 2025-01-19 NOTE — PROGRESS NOTES
"Little De La Cruz is a 69 y.o. female on day 0 of admission presenting with Nausea vomiting and diarrhea.    Subjective   Patient seen and examined.  Continues to complain of nausea, still having diarrhea little bit of abdominal discomfort, dizziness has improved.  Denies any previous history of C. difficile.       Objective     Physical Exam  Gen: NAD, appears stated age  HEENT: EOM, dry mucous membranes  CV: Tachycardic with regular rhythm, no murmurs rubs or gallops  Resp: Clear to auscultation bilaterally, normal effort  Abdomen: soft, NT,+BS  LE: No edema, no deformity  Neuro: A&Ox4, moving all extremities  Last Recorded Vitals  Blood pressure 115/68, pulse (!) 111, temperature 36.1 °C (97 °F), temperature source Temporal, resp. rate 20, height 1.499 m (4' 11\"), weight (!) 40.8 kg (89 lb 15.2 oz), SpO2 90%.  Intake/Output last 3 Shifts:  I/O last 3 completed shifts:  In: 2411 (59.1 mL/kg) [I.V.:311 (7.6 mL/kg); IV Piggyback:2100]  Out: - (0 mL/kg)   Weight: 40.8 kg     Relevant Results                              Assessment/Plan   Assessment & Plan  Nausea vomiting and diarrhea    COPD (chronic obstructive pulmonary disease) (Multi)    GERD (gastroesophageal reflux disease)    Hypertension    Hypokalemia due to excessive gastrointestinal loss of potassium    YADIRA (acute kidney injury) (CMS-[facility])    Metabolic alkalosis    Lactic acidosis    69-year-old female with past medical history of COPD, GERD, hypertension admitted with acute enterocolitis with nausea vomiting and diarrhea, hypokalemia, metabolic alkalosis, YADIRA, lactic acidosis    C. difficile tested positive  Denies any previous history  Not on antibiotics recently  Started on p.o. vancomycin  Will consult ID as well  YADIRA has resolved  Continue Ringer's lactate for now due to severe diarrhea  Repeat magnesium, recheck tomorrow  Hypokalemia is also improving, potassium repleted further  Lactic acidosis has resolved   will advance diet to full liquid  COPD " stable, continue O2 support if needed  Home dose PPI for GERD  Blood pressure medications currently being held due to dehydration and soft blood pressures can resume once blood pressure stabilizes  DVT prophylaxis  Patient was tachycardic in the morning, EKG showed sinus tachycardia             Dae Vallejo MD

## 2025-01-19 NOTE — CARE PLAN
Problem: Pain - Adult  Goal: Verbalizes/displays adequate comfort level or baseline comfort level  Outcome: Progressing  Flowsheets (Taken 1/19/2025 1634)  Verbalizes/displays adequate comfort level or baseline comfort level:   Encourage patient to monitor pain and request assistance   Assess pain using appropriate pain scale   Administer analgesics based on type and severity of pain and evaluate response   Implement non-pharmacological measures as appropriate and evaluate response   Notify Licensed Independent Practitioner if interventions unsuccessful or patient reports new pain   Consider cultural and social influences on pain and pain management     Problem: Safety - Adult  Goal: Free from fall injury  Outcome: Progressing  Flowsheets (Taken 1/19/2025 1634)  Free from fall injury: Instruct family/caregiver on patient safety     Problem: Discharge Planning  Goal: Discharge to home or other facility with appropriate resources  Outcome: Progressing  Flowsheets (Taken 1/19/2025 1634)  Discharge to home or other facility with appropriate resources:   Identify barriers to discharge with patient and caregiver   Arrange for needed discharge resources and transportation as appropriate   Identify discharge learning needs (meds, wound care, etc)     Problem: Chronic Conditions and Co-morbidities  Goal: Patient's chronic conditions and co-morbidity symptoms are monitored and maintained or improved  Outcome: Progressing  Flowsheets (Taken 1/19/2025 1634)  Care Plan - Patient's Chronic Conditions and Co-Morbidity Symptoms are Monitored and Maintained or Improved:   Monitor and assess patient's chronic conditions and comorbid symptoms for stability, deterioration, or improvement   Collaborate with multidisciplinary team to address chronic and comorbid conditions and prevent exacerbation or deterioration   Update acute care plan with appropriate goals if chronic or comorbid symptoms are exacerbated and prevent overall  improvement and discharge     Problem: Fall/Injury  Goal: Not fall by end of shift  Outcome: Progressing  Goal: Be free from injury by end of the shift  Outcome: Progressing  Goal: Verbalize understanding of personal risk factors for fall in the hospital  Outcome: Progressing  Goal: Verbalize understanding of risk factor reduction measures to prevent injury from fall in the home  Outcome: Progressing  Goal: Use assistive devices by end of the shift  Outcome: Progressing  Goal: Pace activities to prevent fatigue by end of the shift  Outcome: Progressing       The clinical goals for the shift include safety to prevent injury/falls, maintain mobility, and update with plan of care

## 2025-01-19 NOTE — H&P
Medical Group History and Physical      ASSESSMENT & PLAN:     #.  Nausea with vomiting and diarrhea  #.  Hypokalemia  #.  Metabolic alkalosis  #.  YADIRA  #.  Lactic acidosis  -P/w persistent N/V/D, severely hypokalemic as a result of this, with metabolic alkalosis likely secondary to copious vomiting  -CT A/P reviewed and shows fluid-filled segments of colon consistent with diarrhea, no evidence of diverticulitis/cholecystitis/pancreatitis  -YADIRA likely prerenal in the setting of GI losses (Scr 1.58, baseline around 1.0)  -Lactic acidosis likely secondary to hypovolemia    Plan:  -Continue IV fluid resuscitation  -Replete potassium per protocol  -Follow-up C. difficile and stool pathogen panel  -Antiemetics as needed  -Trend lactate and renal function  -Clear liquid diet, advance as tolerated    #.  COPD not in acute exacerbation  -No signs of exacerbation, albuterol inhaler as needed    #.  GERD  -Continue home PPI    #.  Hypertension  -Holding home medications in setting of soft pressures    VTE PPX: SCDs      Hunter García MD    --Of note, this documentation is completed using the Dragon Dictation system (voice recognition software). There may be spelling and/or grammatical errors that were not corrected prior to final submission.--    HISTORY OF PRESENT ILLNESS:   Chief Complaint: Vomiting    Little De La Cruz is a 69 y.o. female with a history of COPD, GERD, vitamin D deficiency, hypertension presenting with abnormal labs.  Patient states that she has been having nonbloody watery diarrhea as well as persistent nausea and vomiting for last 2 weeks.  She denies any similar symptoms in the past.  Denies any sick contacts or suspicious foods.  She has not been in the hospital or on antibiotics recently.  Denies any noel abdominal pain but states that she has been having abdominal cramping due to the vomiting and diarrhea.  Denies any fever or chills.       ER Course: Intermittently tachycardic, vital signs  stable otherwise.  Labs notable for SCR 1.58, K2.3, CL 98, bicarb 40, calcium 8.2, lactate 2.4, lipase 126.  CBC within normal limits.  CT A/P showed fluid-filled segments of colon and diverticulosis without evidence of diverticulitis.  Right upper quadrant ultrasound showed cholelithiasis without sonographic features of acute cholecystitis.  Patient was given 2 L normal saline as well as potassium repletion in the ER.    ROS  10 point review of systems negative except per HPI     PAST HISTORIES:     Past Medical History  She has a past medical history of Acute upper respiratory infection, unspecified (09/05/2018), Arthritis, Cataract, Chronic obstructive pulmonary disease, unspecified (11/15/2022), Essential (primary) hypertension (11/15/2022), Gastro-esophageal reflux disease without esophagitis (11/15/2022), Osteoporosis, Shortness of breath, and Wears glasses.    Surgical History  She has a past surgical history that includes Incisional breast biopsy (10/03/2015); Tubal ligation; Appendectomy; and Tonsillectomy.     Social History  She reports that she has quit smoking. Her smoking use included cigarettes. She started smoking about 38 years ago. She has a 38 pack-year smoking history. She has never used smokeless tobacco. She reports current alcohol use. She reports that she does not use drugs.    Family History  Family History   Problem Relation Name Age of Onset    Breast cancer Sister      Breast cancer Father's Sister         Allergies:  Codeine      OBJECTIVE:      Last Recorded Vitals  /74 (BP Location: Right arm, Patient Position: Sitting)   Pulse (!) 110   Temp 36.5 °C (97.7 °F) (Temporal)   Resp 18   Wt (!) 38.6 kg (85 lb)   SpO2 95%     Last I/O:  No intake/output data recorded.    Physical Exam   Gen: NAD, appears stated age  HEENT: EOM, dry mucous membranes  CV: Tachycardic with regular rhythm, no murmurs rubs or gallops  Resp: Clear to auscultation bilaterally, normal effort  Abdomen:  soft, NT,+BS  LE: No edema, no deformity  Neuro: A&Ox4, moving all extremities    LABS AND IMAGING:       Relevant Results  Labs Reviewed   CBC WITH AUTO DIFFERENTIAL - Abnormal       Result Value    WBC 9.5      nRBC 0.0      RBC 4.32      Hemoglobin 12.8      Hematocrit 38.4      MCV 89      MCH 29.6      MCHC 33.3      RDW 12.3      Platelets 350      Neutrophils % 71.0      Immature Granulocytes %, Automated 1.6 (*)     Lymphocytes % 18.1      Monocytes % 7.7      Eosinophils % 1.1      Basophils % 0.5      Neutrophils Absolute 6.77      Immature Granulocytes Absolute, Automated 0.15      Lymphocytes Absolute 1.72      Monocytes Absolute 0.73      Eosinophils Absolute 0.10      Basophils Absolute 0.05     COMPREHENSIVE METABOLIC PANEL - Abnormal    Glucose 96      Sodium 140      Potassium 2.3 (*)     Chloride 90 (*)     Bicarbonate 40 (*)     Anion Gap 12      Urea Nitrogen 22      Creatinine 1.58 (*)     eGFR 35 (*)     Calcium 8.2 (*)     Albumin 3.9      Alkaline Phosphatase 69      Total Protein 6.5      AST 17      Bilirubin, Total 0.2      ALT 9     LIPASE - Abnormal    Lipase 126 (*)     Narrative:     Venipuncture immediately after or during the administration of Metamizole may lead to falsely low results. Testing should be performed immediately prior to Metamizole dosing.   LACTATE - Abnormal    Lactate 2.4 (*)     Narrative:     Venipuncture immediately after or during the administration of Metamizole may lead to falsely low results. Testing should be performed immediately prior to Metamizole dosing.   MAGNESIUM - Normal    Magnesium 1.72     TROPONIN I, HIGH SENSITIVITY - Normal    Troponin I, High Sensitivity 13      Narrative:     Less than 99th percentile of normal range cutoff-  Female and children under 18 years old <14 ng/L; Male <21 ng/L: Negative  Repeat testing should be performed if clinically indicated.     Female and children under 18 years old 14-50 ng/L; Male 21-50 ng/L:  Consistent  with possible cardiac damage and possible increased clinical   risk. Serial measurements may help to assess extent of myocardial damage.     >50 ng/L: Consistent with cardiac damage, increased clinical risk and  myocardial infarction. Serial measurements may help assess extent of   myocardial damage.      NOTE: Children less than 1 year old may have higher baseline troponin   levels and results should be interpreted in conjunction with the overall   clinical context.     NOTE: Troponin I testing is performed using a different   testing methodology at [facility] than at other   [facility]. Direct result comparisons should only   be made within the same method.   URINALYSIS WITH REFLEX CULTURE AND MICROSCOPIC    Narrative:     The following orders were created for panel order Urinalysis with Reflex Culture and Microscopic.  Procedure                               Abnormality         Status                     ---------                               -----------         ------                     Urinalysis with Reflex C...[[id number]]                                                 Extra Urine Gray Tube[[id number]]                                                         Please view results for these tests on the individual orders.   URINALYSIS WITH REFLEX CULTURE AND MICROSCOPIC   EXTRA URINE GRAY TUBE   LACTATE     US right upper quadrant   Final Result   Cholelithiasis without sonographic features of acute cholecystitis.        MACRO:   None        Signed by: Isaac Arcos 1/18/2025 11:12 PM   Dictation workstation:   [id number]      CT abdomen pelvis w IV contrast   Final Result   Fluid-filled segments of colon; please refer diarrhea/colitis.   Colonic diverticulosis without evidence of acute diverticulitis.        Cholelithiasis. Gallbladder is contracted and not well evaluated.        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        MACRO:   None        Signed by: Rich Nugent 1/18/2025 8:29 PM   Dictation workstation:   [id number]      XR chest 1 view   Final Result   No airspace consolidation or pleural effusion.        MACRO:   None        Signed by: Rich Nugent 1/18/2025 7:46 PM   Dictation workstation:   [id number]

## 2025-01-20 ENCOUNTER — PATIENT MESSAGE (OUTPATIENT)
Dept: PRIMARY CARE | Facility: CLINIC | Age: 70
End: 2025-01-20
Payer: MEDICARE

## 2025-01-20 LAB
ANION GAP SERPL CALC-SCNC: 9 MMOL/L (ref 10–20)
ATRIAL RATE: 103 BPM
BUN SERPL-MCNC: 5 MG/DL (ref 6–23)
C COLI+JEJ+UPSA DNA STL QL NAA+PROBE: NOT DETECTED
CALCIUM SERPL-MCNC: 7.9 MG/DL (ref 8.6–10.3)
CHLORIDE SERPL-SCNC: 98 MMOL/L (ref 98–107)
CO2 SERPL-SCNC: 36 MMOL/L (ref 21–32)
CREAT SERPL-MCNC: 0.61 MG/DL (ref 0.5–1.05)
EC STX1 GENE STL QL NAA+PROBE: NOT DETECTED
EC STX2 GENE STL QL NAA+PROBE: NOT DETECTED
EGFRCR SERPLBLD CKD-EPI 2021: >90 ML/MIN/1.73M*2
ERYTHROCYTE [DISTWIDTH] IN BLOOD BY AUTOMATED COUNT: 12.6 % (ref 11.5–14.5)
GLUCOSE SERPL-MCNC: 81 MG/DL (ref 74–99)
HCT VFR BLD AUTO: 35.9 % (ref 36–46)
HGB BLD-MCNC: 11.6 G/DL (ref 12–16)
HOLD SPECIMEN: NORMAL
MAGNESIUM SERPL-MCNC: 1.67 MG/DL (ref 1.6–2.4)
MCH RBC QN AUTO: 29.7 PG (ref 26–34)
MCHC RBC AUTO-ENTMCNC: 32.3 G/DL (ref 32–36)
MCV RBC AUTO: 92 FL (ref 80–100)
NOROVIRUS GI + GII RNA STL NAA+PROBE: NOT DETECTED
NRBC BLD-RTO: 0 /100 WBCS (ref 0–0)
P AXIS: 78 DEGREES
P OFFSET: 185 MS
P ONSET: 150 MS
PLATELET # BLD AUTO: 254 X10*3/UL (ref 150–450)
POTASSIUM SERPL-SCNC: 3.3 MMOL/L (ref 3.5–5.3)
PR INTERVAL: 142 MS
Q ONSET: 221 MS
QRS COUNT: 17 BEATS
QRS DURATION: 74 MS
QT INTERVAL: 382 MS
QTC CALCULATION(BAZETT): 500 MS
QTC FREDERICIA: 457 MS
R AXIS: 69 DEGREES
RBC # BLD AUTO: 3.9 X10*6/UL (ref 4–5.2)
RV RNA STL NAA+PROBE: NOT DETECTED
SALMONELLA DNA STL QL NAA+PROBE: NOT DETECTED
SHIGELLA DNA SPEC QL NAA+PROBE: NOT DETECTED
SODIUM SERPL-SCNC: 140 MMOL/L (ref 136–145)
T AXIS: 67 DEGREES
T OFFSET: 412 MS
V CHOLERAE DNA STL QL NAA+PROBE: NOT DETECTED
VENTRICULAR RATE: 103 BPM
WBC # BLD AUTO: 9.5 X10*3/UL (ref 4.4–11.3)
Y ENTEROCOL DNA STL QL NAA+PROBE: NOT DETECTED

## 2025-01-20 PROCEDURE — 99222 1ST HOSP IP/OBS MODERATE 55: CPT | Performed by: INTERNAL MEDICINE

## 2025-01-20 PROCEDURE — 85027 COMPLETE CBC AUTOMATED: CPT | Performed by: STUDENT IN AN ORGANIZED HEALTH CARE EDUCATION/TRAINING PROGRAM

## 2025-01-20 PROCEDURE — 2500000004 HC RX 250 GENERAL PHARMACY W/ HCPCS (ALT 636 FOR OP/ED): Performed by: STUDENT IN AN ORGANIZED HEALTH CARE EDUCATION/TRAINING PROGRAM

## 2025-01-20 PROCEDURE — 80048 BASIC METABOLIC PNL TOTAL CA: CPT | Performed by: STUDENT IN AN ORGANIZED HEALTH CARE EDUCATION/TRAINING PROGRAM

## 2025-01-20 PROCEDURE — 99232 SBSQ HOSP IP/OBS MODERATE 35: CPT | Performed by: STUDENT IN AN ORGANIZED HEALTH CARE EDUCATION/TRAINING PROGRAM

## 2025-01-20 PROCEDURE — 1210000001 HC SEMI-PRIVATE ROOM DAILY

## 2025-01-20 PROCEDURE — 2500000001 HC RX 250 WO HCPCS SELF ADMINISTERED DRUGS (ALT 637 FOR MEDICARE OP): Performed by: STUDENT IN AN ORGANIZED HEALTH CARE EDUCATION/TRAINING PROGRAM

## 2025-01-20 PROCEDURE — 83735 ASSAY OF MAGNESIUM: CPT | Performed by: STUDENT IN AN ORGANIZED HEALTH CARE EDUCATION/TRAINING PROGRAM

## 2025-01-20 PROCEDURE — 2500000002 HC RX 250 W HCPCS SELF ADMINISTERED DRUGS (ALT 637 FOR MEDICARE OP, ALT 636 FOR OP/ED): Performed by: STUDENT IN AN ORGANIZED HEALTH CARE EDUCATION/TRAINING PROGRAM

## 2025-01-20 PROCEDURE — 36415 COLL VENOUS BLD VENIPUNCTURE: CPT | Performed by: STUDENT IN AN ORGANIZED HEALTH CARE EDUCATION/TRAINING PROGRAM

## 2025-01-20 RX ORDER — MAGNESIUM SULFATE HEPTAHYDRATE 40 MG/ML
2 INJECTION, SOLUTION INTRAVENOUS ONCE
Status: COMPLETED | OUTPATIENT
Start: 2025-01-20 | End: 2025-01-20

## 2025-01-20 RX ORDER — POTASSIUM CHLORIDE 1.5 G/1.58G
40 POWDER, FOR SOLUTION ORAL ONCE
Status: COMPLETED | OUTPATIENT
Start: 2025-01-20 | End: 2025-01-20

## 2025-01-20 RX ADMIN — PANTOPRAZOLE SODIUM 40 MG: 40 TABLET, DELAYED RELEASE ORAL at 06:57

## 2025-01-20 RX ADMIN — ANTACID TABLETS 500 MG: 500 TABLET, CHEWABLE ORAL at 22:16

## 2025-01-20 RX ADMIN — MAGNESIUM OXIDE 400 MG (241.3 MG MAGNESIUM) TABLET 400 MG: TABLET at 08:57

## 2025-01-20 RX ADMIN — MAGNESIUM SULFATE HEPTAHYDRATE 2 G: 40 INJECTION, SOLUTION INTRAVENOUS at 11:20

## 2025-01-20 RX ADMIN — ANTACID TABLETS 500 MG: 500 TABLET, CHEWABLE ORAL at 08:57

## 2025-01-20 RX ADMIN — POTASSIUM CHLORIDE 40 MEQ: 1.5 POWDER, FOR SOLUTION ORAL at 11:20

## 2025-01-20 RX ADMIN — VANCOMYCIN HYDROCHLORIDE 125 MG: 125 CAPSULE ORAL at 16:24

## 2025-01-20 RX ADMIN — VANCOMYCIN HYDROCHLORIDE 125 MG: 125 CAPSULE ORAL at 13:17

## 2025-01-20 RX ADMIN — VANCOMYCIN HYDROCHLORIDE 125 MG: 125 CAPSULE ORAL at 22:16

## 2025-01-20 RX ADMIN — VANCOMYCIN HYDROCHLORIDE 125 MG: 125 CAPSULE ORAL at 06:57

## 2025-01-20 RX ADMIN — Medication 1000 UNITS: at 08:57

## 2025-01-20 ASSESSMENT — COGNITIVE AND FUNCTIONAL STATUS - GENERAL
MOBILITY SCORE: 24
MOBILITY SCORE: 24
DAILY ACTIVITIY SCORE: 24
DAILY ACTIVITIY SCORE: 24

## 2025-01-20 ASSESSMENT — PAIN SCALES - GENERAL
PAINLEVEL_OUTOF10: 0 - NO PAIN

## 2025-01-20 ASSESSMENT — PAIN - FUNCTIONAL ASSESSMENT
PAIN_FUNCTIONAL_ASSESSMENT: 0-10

## 2025-01-20 NOTE — CONSULTS
"Consults      ID Consult:       HPI 69-year-old female with nonbloody watery loose stools as well as persistent nausea and vomiting for 2 weeks.  Presented to the emergency department on 1/18/2025 with tachycardia, low potassium, CT abdomen and pelvis with fluid-filled segments of the colon, diverticulosis without diverticulitis, right upper quadrant ultrasound showed cholelithiasis without evidence of acute cholecystitis.  She was noted to have lactic acidosis in addition to the persistent nausea vomiting and diarrhea.  Also noted to have YADIRA.  C. difficile PCR was positive on 1/18/2025.  Stool pathogen panel pending.    Patient was started on vancomycin 125 mg to be given 4 times a day on Sunday, 1/19/2025    Has had 2 bowel movements which are \"mushy\" over the past 24 hours.  Confirmed by nurse that she has been feeling better overall.  No bloating or nausea.    Estimated Creatinine Clearance: 35.2 mL/min (by C-G formula based on SCr of 0.93 mg/dL).    All 14 ROS discussed with the patient and negative other than as stated as above  Wants to go home tomorrow     has a past medical history of Acute upper respiratory infection, unspecified (09/05/2018), Arthritis, Cataract, Chronic obstructive pulmonary disease, unspecified (11/15/2022), Essential (primary) hypertension (11/15/2022), Gastro-esophageal reflux disease without esophagitis (11/15/2022), Osteoporosis, Shortness of breath, and Wears glasses.     has a past surgical history that includes Incisional breast biopsy (10/03/2015); Tubal ligation; Appendectomy; and Tonsillectomy.     reports that she has quit smoking. Her smoking use included cigarettes. She started smoking about 38 years ago. She has a 38.1 pack-year smoking history. She has never used smokeless tobacco. She reports that she does not currently use alcohol. She reports that she does not use drugs.    Family History   Problem Relation Name Age of Onset    Breast cancer Sister      Breast cancer " Father's Sister           Physical exam  Vitals:    01/19/25 2316   BP: 139/67   Pulse: 93   Resp: 20   Temp: 36.9 °C (98.4 °F)   SpO2: 100%       Patient is awake and alert, sitting up in bed playing on her phone  NAD  Neck supple  Heart S1S2  Chest: Equal expansion, bilaterally clear to auscultation  Abdomen: soft, ND, no guarding.  Slightly tender in general with me touching  Extrem: no pain to palpation  Skin: no rashes, no diaphoresis  Neuro: CNS intact  Affect appropriate and patient is interactive    Admission on 01/18/2025   Component Date Value Ref Range Status    WBC 01/18/2025 9.5  4.4 - 11.3 x10*3/uL Final    nRBC 01/18/2025 0.0  0.0 - 0.0 /100 WBCs Final    RBC 01/18/2025 4.32  4.00 - 5.20 x10*6/uL Final    Hemoglobin 01/18/2025 12.8  12.0 - 16.0 g/dL Final    Hematocrit 01/18/2025 38.4  36.0 - 46.0 % Final    MCV 01/18/2025 89  80 - 100 fL Final    MCH 01/18/2025 29.6  26.0 - 34.0 pg Final    MCHC 01/18/2025 33.3  32.0 - 36.0 g/dL Final    RDW 01/18/2025 12.3  11.5 - 14.5 % Final    Platelets 01/18/2025 350  150 - 450 x10*3/uL Final    Neutrophils % 01/18/2025 71.0  40.0 - 80.0 % Final    Immature Granulocytes %, Automated 01/18/2025 1.6 (H)  0.0 - 0.9 % Final    Immature Granulocyte Count (IG) includes promyelocytes, myelocytes and metamyelocytes but does not include bands. Percent differential counts (%) should be interpreted in the context of the absolute cell counts (cells/UL).    Lymphocytes % 01/18/2025 18.1  13.0 - 44.0 % Final    Monocytes % 01/18/2025 7.7  2.0 - 10.0 % Final    Eosinophils % 01/18/2025 1.1  0.0 - 6.0 % Final    Basophils % 01/18/2025 0.5  0.0 - 2.0 % Final    Neutrophils Absolute 01/18/2025 6.77  1.20 - 7.70 x10*3/uL Final    Percent differential counts (%) should be interpreted in the context of the absolute cell counts (cells/uL).    Immature Granulocytes Absolute, Au* 01/18/2025 0.15  0.00 - 0.70 x10*3/uL Final    Lymphocytes Absolute 01/18/2025 1.72  1.20 - 4.80 x10*3/uL  Final    Monocytes Absolute 01/18/2025 0.73  0.10 - 1.00 x10*3/uL Final    Eosinophils Absolute 01/18/2025 0.10  0.00 - 0.70 x10*3/uL Final    Basophils Absolute 01/18/2025 0.05  0.00 - 0.10 x10*3/uL Final    Magnesium 01/18/2025 1.72  1.60 - 2.40 mg/dL Final    Glucose 01/18/2025 96  74 - 99 mg/dL Final    Sodium 01/18/2025 140  136 - 145 mmol/L Final    Potassium 01/18/2025 2.3 (LL)  3.5 - 5.3 mmol/L Final    Chloride 01/18/2025 90 (L)  98 - 107 mmol/L Final    Bicarbonate 01/18/2025 40 (HH)  21 - 32 mmol/L Final    Anion Gap 01/18/2025 12  10 - 20 mmol/L Final    Urea Nitrogen 01/18/2025 22  6 - 23 mg/dL Final    Creatinine 01/18/2025 1.58 (H)  0.50 - 1.05 mg/dL Final    eGFR 01/18/2025 35 (L)  >60 mL/min/1.73m*2 Final    Calculations of estimated GFR are performed using the 2021 CKD-EPI Study Refit equation without the race variable for the IDMS-Traceable creatinine methods.  https://jasn.asnjournals.org/content/early/2021/09/22/ASN.5821027473    Calcium 01/18/2025 8.2 (L)  8.6 - 10.3 mg/dL Final    Albumin 01/18/2025 3.9  3.4 - 5.0 g/dL Final    Alkaline Phosphatase 01/18/2025 69  33 - 136 U/L Final    Total Protein 01/18/2025 6.5  6.4 - 8.2 g/dL Final    AST 01/18/2025 17  9 - 39 U/L Final    Bilirubin, Total 01/18/2025 0.2  0.0 - 1.2 mg/dL Final    ALT 01/18/2025 9  7 - 45 U/L Final    Patients treated with Sulfasalazine may generate falsely decreased results for ALT.    Lipase 01/18/2025 126 (H)  9 - 82 U/L Final    Lactate 01/18/2025 2.4 (H)  0.4 - 2.0 mmol/L Final    Troponin I, High Sensitivity 01/18/2025 13  0 - 13 ng/L Final    Ventricular Rate 01/18/2025 98  BPM Preliminary    Atrial Rate 01/18/2025 98  BPM Preliminary    IA Interval 01/18/2025 204  ms Preliminary    QRS Duration 01/18/2025 72  ms Preliminary    QT Interval 01/18/2025 390  ms Preliminary    QTC Calculation(Bazett) 01/18/2025 497  ms Preliminary    P [place] 01/18/2025 77  degrees Preliminary    R Axis 01/18/2025 76  degrees Preliminary     T Axis 01/18/2025 88  degrees Preliminary    QRS Count 01/18/2025 16  beats Preliminary    Q Onset 01/18/2025 220  ms Preliminary    P Onset 01/18/2025 118  ms Preliminary    P Offset 01/18/2025 181  ms Preliminary    T Offset 01/18/2025 415  ms Preliminary    QTC Fredericia 01/18/2025 459  ms Preliminary    Color, Urine 01/19/2025 Colorless (N)  Light-Yellow, Yellow, Dark-Yellow Final    Appearance, Urine 01/19/2025 Clear  Clear Final    Specific Gravity, Urine 01/19/2025 1.022  1.005 - 1.035 Final    pH, Urine 01/19/2025 6.0  5.0, 5.5, 6.0, 6.5, 7.0, 7.5, 8.0 Final    Protein, Urine 01/19/2025 NEGATIVE  NEGATIVE, 10 (TRACE), 20 (TRACE) mg/dL Final    Glucose, Urine 01/19/2025 Normal  Normal mg/dL Final    Blood, Urine 01/19/2025 NEGATIVE  NEGATIVE Final    Ketones, Urine 01/19/2025 NEGATIVE  NEGATIVE mg/dL Final    Bilirubin, Urine 01/19/2025 NEGATIVE  NEGATIVE Final    Urobilinogen, Urine 01/19/2025 Normal  Normal mg/dL Final    Nitrite, Urine 01/19/2025 NEGATIVE  NEGATIVE Final    Leukocyte Esterase, Urine 01/19/2025 NEGATIVE  NEGATIVE Final    Extra Tube 01/19/2025 Hold for add-ons.   Final    Auto resulted.    Extra Tube 01/18/2025 Hold for add-ons.   Final    Auto resulted.    Extra Tube 01/18/2025 Hold for add-ons.   Final    Auto resulted.    Lactate 01/19/2025 1.6  0.4 - 2.0 mmol/L Final    WBC 01/19/2025 7.8  4.4 - 11.3 x10*3/uL Final    nRBC 01/19/2025 0.0  0.0 - 0.0 /100 WBCs Final    RBC 01/19/2025 3.82 (L)  4.00 - 5.20 x10*6/uL Final    Hemoglobin 01/19/2025 11.3 (L)  12.0 - 16.0 g/dL Final    Hematocrit 01/19/2025 35.0 (L)  36.0 - 46.0 % Final    MCV 01/19/2025 92  80 - 100 fL Final    MCH 01/19/2025 29.6  26.0 - 34.0 pg Final    MCHC 01/19/2025 32.3  32.0 - 36.0 g/dL Final    RDW 01/19/2025 12.6  11.5 - 14.5 % Final    Platelets 01/19/2025 226  150 - 450 x10*3/uL Final    Glucose 01/19/2025 81  74 - 99 mg/dL Final    Sodium 01/19/2025 141  136 - 145 mmol/L Final    Potassium 01/19/2025 2.8 (LL)  3.5  - 5.3 mmol/L Final    Chloride 01/19/2025 103  98 - 107 mmol/L Final    Bicarbonate 01/19/2025 31  21 - 32 mmol/L Final    Anion Gap 01/19/2025 10  10 - 20 mmol/L Final    Urea Nitrogen 01/19/2025 13  6 - 23 mg/dL Final    Creatinine 01/19/2025 0.93  0.50 - 1.05 mg/dL Final    eGFR 01/19/2025 67  >60 mL/min/1.73m*2 Final    Calculations of estimated GFR are performed using the 2021 CKD-EPI Study Refit equation without the race variable for the IDMS-Traceable creatinine methods.  https://jasn.asnjournals.org/content/early/2021/09/22/ASN.6794657869    Calcium 01/19/2025 7.5 (L)  8.6 - 10.3 mg/dL Final    Magnesium 01/19/2025 1.42 (L)  1.60 - 2.40 mg/dL Final    Extra Tube 01/19/2025 Hold for add-ons.   Final    Auto resulted.    Ventricular Rate 01/19/2025 103  BPM Preliminary    Atrial Rate 01/19/2025 103  BPM Preliminary    VA Interval 01/19/2025 142  ms Preliminary    QRS Duration 01/19/2025 74  ms Preliminary    QT Interval 01/19/2025 382  ms Preliminary    QTC Calculation(Bazett) 01/19/2025 500  ms Preliminary    P [place] 01/19/2025 78  degrees Preliminary    R Axis 01/19/2025 69  degrees Preliminary    T Axis 01/19/2025 67  degrees Preliminary    QRS Count 01/19/2025 17  beats Preliminary    Q Onset 01/19/2025 221  ms Preliminary    P Onset 01/19/2025 150  ms Preliminary    P Offset 01/19/2025 185  ms Preliminary    T Offset 01/19/2025 412  ms Preliminary    QTC Fredericia 01/19/2025 457  ms Preliminary   Lab on 01/18/2025   Component Date Value Ref Range Status    WBC 01/18/2025 10.5  4.4 - 11.3 x10*3/uL Final    nRBC 01/18/2025 0.0  0.0 - 0.0 /100 WBCs Final    RBC 01/18/2025 4.44  4.00 - 5.20 x10*6/uL Final    Hemoglobin 01/18/2025 13.2  12.0 - 16.0 g/dL Final    Hematocrit 01/18/2025 40.2  36.0 - 46.0 % Final    MCV 01/18/2025 91  80 - 100 fL Final    MCH 01/18/2025 29.7  26.0 - 34.0 pg Final    MCHC 01/18/2025 32.8  32.0 - 36.0 g/dL Final    RDW 01/18/2025 12.5  11.5 - 14.5 % Final    Platelets 01/18/2025 373   150 - 450 x10*3/uL Final    Neutrophils % 01/18/2025 69.4  40.0 - 80.0 % Final    Immature Granulocytes %, Automated 01/18/2025 1.9 (H)  0.0 - 0.9 % Final    Immature Granulocyte Count (IG) includes promyelocytes, myelocytes and metamyelocytes but does not include bands. Percent differential counts (%) should be interpreted in the context of the absolute cell counts (cells/UL).    Lymphocytes % 01/18/2025 20.4  13.0 - 44.0 % Final    Monocytes % 01/18/2025 6.4  2.0 - 10.0 % Final    Eosinophils % 01/18/2025 1.3  0.0 - 6.0 % Final    Basophils % 01/18/2025 0.6  0.0 - 2.0 % Final    Neutrophils Absolute 01/18/2025 7.26  1.20 - 7.70 x10*3/uL Final    Percent differential counts (%) should be interpreted in the context of the absolute cell counts (cells/uL).    Immature Granulocytes Absolute, Au* 01/18/2025 0.20  0.00 - 0.70 x10*3/uL Final    Lymphocytes Absolute 01/18/2025 2.14  1.20 - 4.80 x10*3/uL Final    Monocytes Absolute 01/18/2025 0.67  0.10 - 1.00 x10*3/uL Final    Eosinophils Absolute 01/18/2025 0.14  0.00 - 0.70 x10*3/uL Final    Basophils Absolute 01/18/2025 0.06  0.00 - 0.10 x10*3/uL Final    Glucose 01/18/2025 92  74 - 99 mg/dL Final    Sodium 01/18/2025 134 (L)  136 - 145 mmol/L Final    Potassium 01/18/2025 2.8 (LL)  3.5 - 5.3 mmol/L Final    Chloride 01/18/2025 90 (L)  98 - 107 mmol/L Final    Bicarbonate 01/18/2025 34 (H)  21 - 32 mmol/L Final    Anion Gap 01/18/2025 13  10 - 20 mmol/L Final    Urea Nitrogen 01/18/2025 20  6 - 23 mg/dL Final    Creatinine 01/18/2025 1.30 (H)  0.50 - 1.05 mg/dL Final    eGFR 01/18/2025 45 (L)  >60 mL/min/1.73m*2 Final    Calculations of estimated GFR are performed using the 2021 CKD-EPI Study Refit equation without the race variable for the IDMS-Traceable creatinine methods.  https://jasn.asnjournals.org/content/early/2021/09/22/ASN.3787714688    Calcium 01/18/2025 8.5 (L)  8.6 - 10.3 mg/dL Final    Albumin 01/18/2025 4.1  3.4 - 5.0 g/dL Final    Alkaline Phosphatase  01/18/2025 64  33 - 136 U/L Final    Total Protein 01/18/2025 6.6  6.4 - 8.2 g/dL Final    AST 01/18/2025 20  9 - 39 U/L Final    Bilirubin, Total 01/18/2025 0.4  0.0 - 1.2 mg/dL Final    ALT 01/18/2025 11  7 - 45 U/L Final    Patients treated with Sulfasalazine may generate falsely decreased results for ALT.    Sedimentation Rate 01/18/2025 27  0 - 30 mm/h Final    C. difficile, PCR 01/18/2025 Detected (A)  Not Detected Final     Afebrile, no tachycardia, BP stable    Assessment:   Positive community-acquired C. difficile PCR with symptoms of nausea vomiting diarrhea  Hypokalemia, improving slowly  YADIRA  Lactic acidosis -improving  Cholelithiasis without acute cholecystitis    Plan:   Continue vancomycin 125 mg p.o. every 4 hours times total 10 days  Okay to discharge when okay with the primary  Follow-up with primary care physician within 1 to 2 weeks of discharge  Infectious disease follow-up as needed only  Discussed dietary avoidances while her stomach is still upset  Daily yogurt recommended    All communication directed to consulting provider.   Thank you very much for this consultation.     Time spent before, during, and after this consult reviewed data and coordinating care on the date of this consultation, including face to face visit with the patient > 60 min

## 2025-01-20 NOTE — CARE PLAN
The patient's goals for the shift include      The clinical goals for the shift include Patient will remain free from falls by end of shift.      Problem: Safety - Adult  Goal: Free from fall injury  Outcome: Progressing

## 2025-01-20 NOTE — PROGRESS NOTES
01/20/25 1125   Discharge Planning   Living Arrangements Alone   Support Systems Family members   Assistance Needed PTA - none, independent at baseline   Type of Residence Private residence   Do you have animals or pets at home? No   Home or Post Acute Services None   Expected Discharge Disposition Home     Presented with N,V, and D for about 2 weeks. K 2.8 today at PCP office. CDIFF positive. Pt reports she was ordered an outpatient home oxygen eval by Dr Torrez her PCP but has not yet had it d/t being admitted. Will follow for possible needs for new home going oxygen. Plans for HOME at [place].

## 2025-01-20 NOTE — CARE PLAN
The patient's goals for the shift include      The clinical goals for the shift include safety to prevent injury/falls, maintain mobility, and update with plan of care    Over the shift, the patient did not make progress toward the following goals. Barriers to progression include . Recommendations to address these barriers include   Problem: Pain - Adult  Goal: Verbalizes/displays adequate comfort level or baseline comfort level  Outcome: Progressing     Problem: Safety - Adult  Goal: Free from fall injury  Outcome: Progressing     Problem: Discharge Planning  Goal: Discharge to home or other facility with appropriate resources  Outcome: Progressing     Problem: Chronic Conditions and Co-morbidities  Goal: Patient's chronic conditions and co-morbidity symptoms are monitored and maintained or improved  Outcome: Progressing     Problem: Fall/Injury  Goal: Not fall by end of shift  Outcome: Progressing  Goal: Be free from injury by end of the shift  Outcome: Progressing  Goal: Verbalize understanding of personal risk factors for fall in the hospital  Outcome: Progressing  Goal: Verbalize understanding of risk factor reduction measures to prevent injury from fall in the home  Outcome: Progressing  Goal: Use assistive devices by end of the shift  Outcome: Progressing  Goal: Pace activities to prevent fatigue by end of the shift  Outcome: Progressing   .

## 2025-01-20 NOTE — PROGRESS NOTES
"Little De La Cruz is a 69 y.o. female on day 1 of admission presenting with Nausea vomiting and diarrhea.    Subjective   Patient seen and examined.  Reports improvement in diarrhea, has had 2 bowel movements since morning, abdominal pain has improved significantly as well, no further episodes of vomiting, feels much better.  Objective     Physical Exam  Gen: NAD, appears stated age  HEENT: EOM, dry mucous membranes  CV: Tachycardic with regular rhythm, no murmurs rubs or gallops  Resp: Clear to auscultation bilaterally, normal effort  Abdomen: soft, NT,+BS  LE: No edema, no deformity  Neuro: A&Ox4, moving all extremities  Last Recorded Vitals  Blood pressure 105/61, pulse 107, temperature 36.7 °C (98.1 °F), resp. rate 20, height 1.499 m (4' 11\"), weight (!) 40.8 kg (89 lb 15.2 oz), SpO2 (!) 89%.  Intake/Output last 3 Shifts:  I/O last 3 completed shifts:  In: 3416.8 (83.7 mL/kg) [I.V.:1316.8 (32.3 mL/kg); IV Piggyback:2100]  Out: - (0 mL/kg)   Weight: 40.8 kg     Relevant Results                              Assessment/Plan   Assessment & Plan  Nausea vomiting and diarrhea    COPD (chronic obstructive pulmonary disease) (Multi)    GERD (gastroesophageal reflux disease)    Hypertension    Hypokalemia due to excessive gastrointestinal loss of potassium    YADIRA (acute kidney injury) (CMS-[facility])    Metabolic alkalosis    Lactic acidosis    69-year-old female with past medical history of COPD, GERD, hypertension admitted with acute enterocolitis with nausea vomiting and diarrhea, hypokalemia, metabolic alkalosis, YADIRA, lactic acidosis    C. difficile tested positive  Denies any previous history  Not on antibiotics recently  Started on p.o. vancomycin, will needed for 10 days  Follow-up on IDs recommendations  YADIRA has resolved  Will stop IV fluids, put her on regular diet  Magnesium is low normal will give another 2 g  Replete potassium, recheck tomorrow  Lactic acidosis has resolved   COPD stable, continue O2 support if " needed  Home dose PPI for GERD  Blood pressure medications currently being held due to dehydration and soft blood pressures can resume once blood pressure stabilizes  DVT prophylaxis    Plan to discharge tomorrow if diarrhea improves and tolerating regular food.             Dae Vallejo MD

## 2025-01-21 ENCOUNTER — PHARMACY VISIT (OUTPATIENT)
Dept: PHARMACY | Facility: CLINIC | Age: 70
End: 2025-01-21
Payer: COMMERCIAL

## 2025-01-21 ENCOUNTER — TELEPHONE (OUTPATIENT)
Dept: GASTROENTEROLOGY | Facility: CLINIC | Age: 70
End: 2025-01-21
Payer: MEDICARE

## 2025-01-21 VITALS
SYSTOLIC BLOOD PRESSURE: 114 MMHG | BODY MASS INDEX: 18.13 KG/M2 | DIASTOLIC BLOOD PRESSURE: 68 MMHG | OXYGEN SATURATION: 92 % | WEIGHT: 89.95 LBS | HEART RATE: 113 BPM | HEIGHT: 59 IN | RESPIRATION RATE: 16 BRPM | TEMPERATURE: 96.8 F

## 2025-01-21 LAB
ANION GAP SERPL CALC-SCNC: 9 MMOL/L (ref 10–20)
BUN SERPL-MCNC: 6 MG/DL (ref 6–23)
CALCIUM SERPL-MCNC: 8 MG/DL (ref 8.6–10.3)
CHLORIDE SERPL-SCNC: 97 MMOL/L (ref 98–107)
CO2 SERPL-SCNC: 37 MMOL/L (ref 21–32)
CREAT SERPL-MCNC: 0.66 MG/DL (ref 0.5–1.05)
EGFRCR SERPLBLD CKD-EPI 2021: >90 ML/MIN/1.73M*2
ERYTHROCYTE [DISTWIDTH] IN BLOOD BY AUTOMATED COUNT: 12.5 % (ref 11.5–14.5)
GLUCOSE SERPL-MCNC: 99 MG/DL (ref 74–99)
H PYLORI AG STL QL IA: NEGATIVE
HCT VFR BLD AUTO: 37.6 % (ref 36–46)
HGB BLD-MCNC: 12 G/DL (ref 12–16)
HOLD SPECIMEN: NORMAL
MAGNESIUM SERPL-MCNC: 1.93 MG/DL (ref 1.6–2.4)
MCH RBC QN AUTO: 29.6 PG (ref 26–34)
MCHC RBC AUTO-ENTMCNC: 31.9 G/DL (ref 32–36)
MCV RBC AUTO: 93 FL (ref 80–100)
NRBC BLD-RTO: 0 /100 WBCS (ref 0–0)
PLATELET # BLD AUTO: 264 X10*3/UL (ref 150–450)
POTASSIUM SERPL-SCNC: 3.7 MMOL/L (ref 3.5–5.3)
RBC # BLD AUTO: 4.06 X10*6/UL (ref 4–5.2)
SODIUM SERPL-SCNC: 139 MMOL/L (ref 136–145)
WBC # BLD AUTO: 10 X10*3/UL (ref 4.4–11.3)

## 2025-01-21 PROCEDURE — 2500000001 HC RX 250 WO HCPCS SELF ADMINISTERED DRUGS (ALT 637 FOR MEDICARE OP): Performed by: NURSE PRACTITIONER

## 2025-01-21 PROCEDURE — 2500000002 HC RX 250 W HCPCS SELF ADMINISTERED DRUGS (ALT 637 FOR MEDICARE OP, ALT 636 FOR OP/ED): Performed by: STUDENT IN AN ORGANIZED HEALTH CARE EDUCATION/TRAINING PROGRAM

## 2025-01-21 PROCEDURE — 36415 COLL VENOUS BLD VENIPUNCTURE: CPT | Performed by: STUDENT IN AN ORGANIZED HEALTH CARE EDUCATION/TRAINING PROGRAM

## 2025-01-21 PROCEDURE — 94760 N-INVAS EAR/PLS OXIMETRY 1: CPT

## 2025-01-21 PROCEDURE — 2500000004 HC RX 250 GENERAL PHARMACY W/ HCPCS (ALT 636 FOR OP/ED): Performed by: STUDENT IN AN ORGANIZED HEALTH CARE EDUCATION/TRAINING PROGRAM

## 2025-01-21 PROCEDURE — 85027 COMPLETE CBC AUTOMATED: CPT | Performed by: STUDENT IN AN ORGANIZED HEALTH CARE EDUCATION/TRAINING PROGRAM

## 2025-01-21 PROCEDURE — 80048 BASIC METABOLIC PNL TOTAL CA: CPT | Performed by: STUDENT IN AN ORGANIZED HEALTH CARE EDUCATION/TRAINING PROGRAM

## 2025-01-21 PROCEDURE — 83735 ASSAY OF MAGNESIUM: CPT | Performed by: STUDENT IN AN ORGANIZED HEALTH CARE EDUCATION/TRAINING PROGRAM

## 2025-01-21 PROCEDURE — 99239 HOSP IP/OBS DSCHRG MGMT >30: CPT | Performed by: HOSPITALIST

## 2025-01-21 PROCEDURE — 2500000001 HC RX 250 WO HCPCS SELF ADMINISTERED DRUGS (ALT 637 FOR MEDICARE OP): Performed by: STUDENT IN AN ORGANIZED HEALTH CARE EDUCATION/TRAINING PROGRAM

## 2025-01-21 PROCEDURE — RXMED WILLOW AMBULATORY MEDICATION CHARGE

## 2025-01-21 RX ORDER — VANCOMYCIN HYDROCHLORIDE 125 MG/1
125 CAPSULE ORAL 4 TIMES DAILY
Qty: 32 CAPSULE | Refills: 0 | Status: SHIPPED | OUTPATIENT
Start: 2025-01-21 | End: 2025-01-29

## 2025-01-21 RX ORDER — BENZONATATE 100 MG/1
100 CAPSULE ORAL 3 TIMES DAILY PRN
Status: DISCONTINUED | OUTPATIENT
Start: 2025-01-21 | End: 2025-01-21 | Stop reason: HOSPADM

## 2025-01-21 RX ADMIN — BENZONATATE 100 MG: 100 CAPSULE ORAL at 06:27

## 2025-01-21 RX ADMIN — VANCOMYCIN HYDROCHLORIDE 125 MG: 125 CAPSULE ORAL at 06:27

## 2025-01-21 RX ADMIN — Medication 1000 UNITS: at 09:34

## 2025-01-21 RX ADMIN — PANTOPRAZOLE SODIUM 40 MG: 40 TABLET, DELAYED RELEASE ORAL at 06:27

## 2025-01-21 RX ADMIN — ANTACID TABLETS 500 MG: 500 TABLET, CHEWABLE ORAL at 09:33

## 2025-01-21 RX ADMIN — MAGNESIUM OXIDE 400 MG (241.3 MG MAGNESIUM) TABLET 400 MG: TABLET at 09:33

## 2025-01-21 RX ADMIN — VANCOMYCIN HYDROCHLORIDE 125 MG: 125 CAPSULE ORAL at 13:50

## 2025-01-21 ASSESSMENT — PAIN SCALES - GENERAL
PAINLEVEL_OUTOF10: 0 - NO PAIN
PAINLEVEL_OUTOF10: 0 - NO PAIN

## 2025-01-21 ASSESSMENT — PAIN - FUNCTIONAL ASSESSMENT
PAIN_FUNCTIONAL_ASSESSMENT: 0-10
PAIN_FUNCTIONAL_ASSESSMENT: 0-10

## 2025-01-21 NOTE — PROGRESS NOTES
Plan is for dc home with H@H today after RT eval for home O2. Provided information on H@H. Pt states that she has no home going needs at this time, states that she her sister will pick her up at dc. She is up ind in room without difficulty.     UPDATE-  RT eval complete, pt requires O2 at 2L NC for home going.

## 2025-01-21 NOTE — PROGRESS NOTES
Home Oxygen Evaluation        Date/Time SpO2 Medical Gas Therapy Medical Gas Delivery Method Oxygen L/min Patient is on During the Study Patient Activity During Study    01/21/25 1200 96 %  None (Room air)  --  --  At rest     01/21/25 1201 73 %  None (Room air)  --  --  Ambulating     01/21/25 1202 92 %  Supplemental oxygen  Nasal cannula  2 L/min  Ambulating                     Was a Home Oxygen Evaluation Performed? Yes  Based on the Home Oxygen Evaluation, Does the Patient Qualify for Home Oxygen Therapy? Yes            Recommendations:    Recommended Oxygen Dose at Rest is 0 L/min   Recommended Oxygen Dose with Activity is 2 L/min

## 2025-01-21 NOTE — CARE PLAN
The patient's goals for the shift include      The clinical goals for the shift include patient will have HR WNL thorughout shift    Problem: Safety - Adult  Goal: Free from fall injury  Outcome: Progressing

## 2025-01-21 NOTE — DISCHARGE SUMMARY
Discharge Diagnosis  Nausea vomiting and diarrhea    Discharge Meds     Your medication list        START taking these medications        Instructions Last Dose Given Next Dose Due   vancomycin 125 mg capsule  Commonly known as: Vancocin      Take 1 capsule (125 mg) by mouth 4 times a day for 8 days.              CONTINUE taking these medications        Instructions Last Dose Given Next Dose Due   albuterol 2.5 mg /3 mL (0.083 %) nebulizer solution      Take 3 mL (2.5 mg) by nebulization 4 times a day as needed for wheezing or shortness of breath.       albuterol 90 mcg/actuation inhaler      Inhale 2 puffs every 4 hours if needed for wheezing or shortness of breath.       amLODIPine 5 mg tablet  Commonly known as: Norvasc      Take 1 tablet (5 mg) by mouth once daily.       atenolol 50 mg tablet  Commonly known as: Tenormin      Take 1 tablet (50 mg) by mouth once daily.       cholecalciferol 25 MCG (1000 UT) capsule  Commonly known as: Vitamin D-3           diphenoxylate-atropine 2.5-0.025 mg tablet  Commonly known as: Lomotil      Take 1 tablet by mouth 4 times a day as needed for diarrhea for up to 10 days.       magnesium oxide 400 mg (241.3 mg magnesium) tablet  Commonly known as: Mag-Ox      Take 1 tablet (400 mg) by mouth once daily.       nebulizer and compressor device      1 Device every 6 hours if needed (sob or wheezint).       omeprazole 20 mg DR capsule  Commonly known as: PriLOSEC      Take 1 capsule (20 mg) by mouth once daily.       ondansetron 4 mg tablet  Commonly known as: Zofran      Take 1-2 tablets (4-8 mg) by mouth every 8 hours if needed for nausea or vomiting for up to 7 days.       Reusable Nebulizer Kit kit  Generic drug: nebulizer accessories      Use nebulizer every 6 hours as needed.       sodium,potassium,mag sulfates 17.5-3.13-1.6 gram solution  Commonly known as: Suprep      Take one bottle twice as directed by the prep instructions       zoledronic acid 5 mg/100 mL  piggyback  Commonly known as: Reclast           zoledronic acid 5 mg/100 mL piggyback  Commonly known as: Reclast      Infuse 100 mL (5 mg) at 400 mL/hr over 15 minutes into a venous catheter 1 time for 1 dose.              ASK your doctor about these medications        Instructions Last Dose Given Next Dose Due   predniSONE 20 mg tablet  Commonly known as: Deltasone      Take 3 tablets for 2 days, then 2 tablets for 2 days, then 1 tablet for 2 days                 Where to Get Your Medications        These medications were sent to [facility]  [address], [address], [place] [address]      Hours: 9 AM to 5:30 PM Mon-Fri, 9 AM to 1 PM Saturday Phone: [phone]   vancomycin 125 mg capsule         Test Results Pending At Discharge  Pending Labs       No current pending labs.            Hospital Course: 69 year old female with history of COPD admitted with nausea vomiting and diarrhea and found to have cdiff colitis and YADIRA    -continue vancomycin PO for 10 days, ID was following  -has PCP and GI follow up, had colonoscopy scheduled but will reschedule  -YADIRA resolved, improved with hydration     Hypokalemia: secondary to above, improved with supplementation     Hx of COPD: no in exacerbation, but on o2, has been trying to get o2 prescribed per patient  -will do home o2 eval here and will need 2L with exertion on discharge    -continue home nebs and inhalers    HTN: continue home meds    Discharged home with healthy at home in stable condition. Greater than 30 minutes of clinical time spent caring for this patient.     Pertinent Physical Exam At Time of Discharge  Gen: NAD  HEENT: EOM, MMM  CV: RRR, no murmurs rubs or gallops  Resp: coarse rhonchi b/l   Abdomen: soft, NT,+BS  LE: no LE swelling       Outpatient Follow-Up  Future Appointments   Date Time Provider Department Center   2/3/2025  2:20 PM Alber Damian DO [id number] [place]            Heraclio Salazar MD

## 2025-01-21 NOTE — DOCUMENTATION CLARIFICATION NOTE
"    PATIENT:               VIKTOR COLVIN  ACCT #:                  [id number]  MRN:                       [id number]  :                       1955  ADMIT DATE:       2025 6:37 PM  DISCH DATE:  RESPONDING PROVIDER #:        [id number]          PROVIDER RESPONSE TEXT:    Underweight    CDI QUERY TEXT:    Clarification      Instruction:    Based on your assessment of the patient and the clinical information, please provide the requested documentation by clicking on the appropriate radio button and enter any additional information if prompted.    Question: Is there a diagnosis associated with a BMI of 18.17 appropriate for this patient    When answering this query, please exercise your independent professional judgment. The fact that a question is being asked, does not imply that any particular answer is desired or expected.    The patient's clinical indicators include:  Clinical Information: 69 P/w diarrhea, nausea vomiting, found to have C.diff and YADIRA    Clinical Indicators: HT 4'11,  Wt 89 lbs,  BMI 18.7,   Labs   Potassium 2.8     ED \"Patient states for the last 2 weeks has been having intermittent abdominal cramping, nausea vomiting, and watery nonbloody diarrhea \"  \"Patient is well-nourished and well-developed.\" \" benefit from hospitalization for IV hydration and potassium replacement.     Consult Dietician \"RD consulted for Low BMI. BMI currently 18.17\" \" Pt says eats fairly well at home\" \"Says ate 75% of dinner last night and >75% of breakfast this morning.\" \"discussed ways to increase calories and protein at home.\"  \"Pt declined full nutritional assessment as pt is pending discharge to home. \"     DC Summary \" admitted with nausea vomiting and diarrhea and found to have cdiff colitis and YADIRA\"    Treatment: Daily labs,  Dietician consult, sodium chloride 0.9 % bolus 1,000 mL  once, potassium chloride 20 mEq in sterile water for injection 100 mL, q2hrs    Risk Factors: Diarrhea, dehydration, " vomiting  Options provided:  -- Abnormal weight loss due to poor appetite  -- Underweight  -- Failure to thrive  -- Cachexia  -- Normal body habitus  -- Other - I will add my own diagnosis  -- Refer to Clinical Documentation Reviewer    Query created by: Celine Jaffe on 1/21/2025 1:41 PM      Electronically signed by:  VIRY MEI MD 1/21/2025 1:48 PM

## 2025-01-21 NOTE — PROGRESS NOTES
Nutrition Progress Note    RD consulted for Low BMI. BMI currently 18.17. Pt here with dehydration, hypokalemia, YADIRA, and C. Diff. Potassium currently WNL at 3.7. Noted plan to discharge home. Pt says eats fairly well at home, though only eats when is hungry. Says ate 75% of dinner last night and >75% of breakfast this morning. Discussed ways to increase calories and protein at home. Infectious disease recommended daily yogurt, pt plans to consume this once goes home. Pt declined full nutritional assessment as pt is pending discharge to home.

## 2025-01-22 ENCOUNTER — PATIENT OUTREACH (OUTPATIENT)
Dept: PRIMARY CARE | Facility: CLINIC | Age: 70
End: 2025-01-22
Payer: MEDICARE

## 2025-01-22 LAB — CALPROTECTIN STL-MCNT: 129 UG/G

## 2025-01-22 NOTE — PROGRESS NOTES
Discharge Facility: [facility]  Discharge Diagnosis: Nausea vomiting and diarrhea   Admission Date: 1/19/25  Discharge Date:  1/21/25 - [facility] at Home    PCP Appointment Date: 2/3/25  Specialist Appointment Date:   - GI: TBD  Hospital Encounter and Summary Linked: Yes  See discharge assessment below for further details    Medications  Medications reviewed with patient/caregiver?: Yes (new/changes only) (1/22/2025  9:06 AM)  Is the patient having any side effects they believe may be caused by any medication additions or changes?: No (1/22/2025  9:06 AM)  Does the patient have all medications ordered at discharge?: Yes (1/22/2025  9:06 AM)  Care Management Interventions: No intervention needed (1/22/2025  9:06 AM)  Prescription Comments: START: vancomycin (1/22/2025  9:06 AM)  Is the patient taking all medications as directed (includes completed medication regime)?: Yes (1/22/2025  9:06 AM)  Care Management Interventions: Provided patient education (1/22/2025  9:06 AM)    Appointments  Does the patient have a primary care provider?: Yes (1/22/2025  9:06 AM)  Care Management Interventions: Verified appointment date/time/provider (1/22/2025  9:06 AM)  Has the patient kept scheduled appointments due by today?: Yes (1/22/2025  9:06 AM)  Care Management Interventions: Advised patient to keep appointment (1/22/2025  9:06 AM)    Self Management  Has home health visited the patient within 72 hours of discharge?: Not applicable (1/22/2025  9:06 AM)  What Durable Medical Equipment (DME) was ordered?: 2L NC (1/22/2025  9:06 AM)  Has all Durable Medical Equipment (DME) been delivered?: Yes (1/22/2025  9:06 AM)    Patient Teaching  Does the patient have access to their discharge instructions?: Yes (1/22/2025  9:06 AM)  Care Management Interventions: Reviewed instructions with patient (1/22/2025  9:06 AM)  What is the patient's perception of their health status since discharge?: Improving (1/22/2025  9:06 AM)  Is the patient/caregiver  able to teach back the hierarchy of who to call/visit for symptoms/problems? PCP, Specialist, Home Health nurse, Urgent Care, ED, 911: Yes (1/22/2025  9:06 AM)  Patient/Caregiver Education Comments: Patient reports she is feeling better today; she now has oxygen at home and her breathing has been pretty good. Reviewed medication changes. Pt is aware of referral to healthy at home. Pt prefers to keep appt with PCP as schedule. Patient denies questions/concerns at this time. (1/22/2025  9:06 AM)

## 2025-01-23 NOTE — TELEPHONE ENCOUNTER
I called this patient to reschedule the colonoscopy for 12 weeks out (4/10/25). She did not want to schedule at this time, she wants to wait until she sees her PCP next week.   I told her that I will follow up with her next week.

## 2025-01-29 DIAGNOSIS — J44.9 CHRONIC OBSTRUCTIVE PULMONARY DISEASE, UNSPECIFIED COPD TYPE (MULTI): Primary | ICD-10-CM

## 2025-01-30 ENCOUNTER — APPOINTMENT (OUTPATIENT)
Dept: GASTROENTEROLOGY | Facility: HOSPITAL | Age: 70
End: 2025-01-30
Payer: MEDICARE

## 2025-02-03 ENCOUNTER — APPOINTMENT (OUTPATIENT)
Dept: PRIMARY CARE | Facility: CLINIC | Age: 70
End: 2025-02-03
Payer: MEDICARE

## 2025-02-03 DIAGNOSIS — A04.72 C. DIFFICILE COLITIS: ICD-10-CM

## 2025-02-03 DIAGNOSIS — R09.02 HYPOXEMIA: ICD-10-CM

## 2025-02-03 DIAGNOSIS — R05.3 CHRONIC COUGH: ICD-10-CM

## 2025-02-03 DIAGNOSIS — Z09 HOSPITAL DISCHARGE FOLLOW-UP: Primary | ICD-10-CM

## 2025-02-03 DIAGNOSIS — K21.9 GASTROESOPHAGEAL REFLUX DISEASE WITHOUT ESOPHAGITIS: ICD-10-CM

## 2025-02-03 DIAGNOSIS — E78.2 HYPERLIPIDEMIA, MIXED: ICD-10-CM

## 2025-02-03 DIAGNOSIS — E55.9 VITAMIN D DEFICIENCY: ICD-10-CM

## 2025-02-03 DIAGNOSIS — I10 PRIMARY HYPERTENSION: ICD-10-CM

## 2025-02-03 DIAGNOSIS — M81.0 AGE-RELATED OSTEOPOROSIS WITHOUT CURRENT PATHOLOGICAL FRACTURE: ICD-10-CM

## 2025-02-03 DIAGNOSIS — J44.9 CHRONIC OBSTRUCTIVE PULMONARY DISEASE, UNSPECIFIED COPD TYPE (MULTI): ICD-10-CM

## 2025-02-03 PROCEDURE — 1123F ACP DISCUSS/DSCN MKR DOCD: CPT | Performed by: FAMILY MEDICINE

## 2025-02-03 PROCEDURE — 3074F SYST BP LT 130 MM HG: CPT | Performed by: FAMILY MEDICINE

## 2025-02-03 PROCEDURE — 99495 TRANSJ CARE MGMT MOD F2F 14D: CPT | Performed by: FAMILY MEDICINE

## 2025-02-03 PROCEDURE — 1160F RVW MEDS BY RX/DR IN RCRD: CPT | Performed by: FAMILY MEDICINE

## 2025-02-03 PROCEDURE — 1159F MED LIST DOCD IN RCRD: CPT | Performed by: FAMILY MEDICINE

## 2025-02-03 PROCEDURE — 3008F BODY MASS INDEX DOCD: CPT | Performed by: FAMILY MEDICINE

## 2025-02-03 PROCEDURE — 1111F DSCHRG MED/CURRENT MED MERGE: CPT | Performed by: FAMILY MEDICINE

## 2025-02-03 PROCEDURE — 3078F DIAST BP <80 MM HG: CPT | Performed by: FAMILY MEDICINE

## 2025-02-03 RX ORDER — BROMPHENIRAMINE MALEATE, PSEUDOEPHEDRINE HYDROCHLORIDE, AND DEXTROMETHORPHAN HYDROBROMIDE 2; 30; 10 MG/5ML; MG/5ML; MG/5ML
5 SYRUP ORAL 4 TIMES DAILY PRN
Qty: 120 ML | Refills: 1 | Status: SHIPPED | OUTPATIENT
Start: 2025-02-03 | End: 2025-02-13

## 2025-02-03 NOTE — ASSESSMENT & PLAN NOTE
Patient will continue with the current medication.    Dietary changes, exercise, and maintenance of healthy weight were discussed at length.    Orders:    Follow Up In [facility] - PCP - Established

## 2025-02-03 NOTE — ASSESSMENT & PLAN NOTE
Patient reports today with a cough.     Orders:    brompheniramine-pseudoeph-DM 2-30-10 mg/5 mL syrup; Take 5 mL by mouth 4 times a day as needed for allergies for up to 10 days.

## 2025-02-03 NOTE — ASSESSMENT & PLAN NOTE
Stable based on last labs.  Continue the current dose of vitamin D supplementation.    Orders:    Follow Up In [facility] - PCP - Established

## 2025-02-03 NOTE — ASSESSMENT & PLAN NOTE
Stable with the current treatment plan.  Continue current medication and appropriate dietary changes.    Orders:    Follow Up In [facility] - PCP - Established

## 2025-02-03 NOTE — ASSESSMENT & PLAN NOTE
BP appears mildly low today.  She has been off of her medication since her illness began.  At this point we will have her remain off of her antihypertensive therapy until seen for follow-up 2/17/2025       Orders:    Follow Up In [facility] - PCP - Established

## 2025-02-03 NOTE — PROGRESS NOTES
"Subjective     Patient ID: Little De La Cruz is a 69 y.o. female who presents for Hospital Follow-up.    HPI     Patient was taken to [facility] 01/18/2025 due to the continuous diarrhea, nausea and weakness.  Patient was diagnosed with C-diff colitis.  She was released from the hospital on the 21st of January.   She states her diarrhea has not completely resolved, but has improved.    She finished her antibiotics on the 28th of January.  Patient reports stopping all her medications since she has been sick.         Patient reports here with a cough.   Cough is mildly productive at times.  Denies any wheezing or shortness of breath.  No fever, chills, nausea, or vomiting at this time.    She was sent home with an oxygen as her oxygen saturations were low during hospitalization.   She states she would like a portable oxygen tank to carry around or an oxygen concentrator would be even better for her to carry due to the lighter weight.  She has difficulty transporting the larger portable tank at this time.  The company supplying this was called \"[employer]\".       Patient reports still having a decrease in her appetite.      Patient also had low potassium levels and magnesium.     Discharge Facility: [facility]  Discharge Diagnosis: Nausea vomiting and diarrhea   Admission Date: 1/19/25  Discharge Date:  1/21/25 - [facility]   Pt outreach completed on 1/22/2025      Review of Systems  Constitutional: Patient denies any fever, chills.  She has had some weight loss and decreased appetite with the recent illness.    Cardiovascular: Patient denies any chest pain, shortness of breath with exertion, tachycardia, palpitations, orthopnea, or paroxysmal nocturnal dyspnea.    Respiratory: Patient denies any shortness breath or wheezing. Confirms cough.     Gastrointestinal: Patient denies any nausea, vomiting, constipation, melena, hematochezia, or reflux symptoms. Confirms diarrhea.     Skin: Denies any rashes or skin " "lesions  Neurology: Patient denies any new motor or sensory losses. Denies any numbness, tingling, weakness, and incoordination of the extremities. Patient also denies any tremor, seizures, or gait instability.  Endocrinology: Denies any polyuria, polydipsia, polyphagia, or heat/cold intolerance.  Psychiatric: She denies any depression, anxiety, or suicidal/homicidal ideation.    Objective   BP (!) 72/38   Pulse (!) 130   Temp 36.5 °C (97.7 °F) (Temporal)   Ht 1.499 m (4' 11\")   Wt (!) 40.4 kg (89 lb)   SpO2 (!) 69%   BMI 17.98 kg/m²     Physical Exam    General Appearance: Alert and cooperative, in no acute distress, well-developed/well-nourished underweight female.   Neck: Supple and without adenopathy or rigidity. There is no JVD at 90° and no carotid bruits are noted. There is no thyromegaly, thyroid tenderness, or palpable thyroid nodules.  Heart: Regular rate and rhythm without murmur or ectopy.  Lungs: Clear to auscultation bilaterally with good air exchange.  Skin: Good turgor, moist, warm and without rashes or lesions.  Neurological exam: Alert and oriented ×3, no tremor, normal gait.  Extremities: No clubbing, cyanosis, or edema  Psychiatric: Appropriate mood and affect, good insight and judgment, no delusions or thought disorders, no suicidal or homicidal ideation    Assessment/Plan     Assessment & Plan  Hospital discharge follow-up    Available hospital records were reviewed and discussed.   Medication list was reviewed and reconciled.     Orders:    Follow Up In [facility] - PCP - Established; Future    C. difficile colitis    Patient continues to complain of some diarrhea.   She did complete antibiotic therapy with vancomycin.  We will repeat stool testing for C. Difficile.    Orders:    C. difficile, PCR    CBC and Auto Differential; Future    Basic Metabolic Panel; Future    Follow Up In [facility] - PCP - Established; Future    Chronic obstructive pulmonary disease, " unspecified COPD type (Multi)    She was provided with home oxygen after her oxygen saturation was found to be low during her hospitalization.  It is low here in the office again but she did not bring her oxygen.  We will try to order her an oxygen concentrator which would be more light weight for him to carry.  Patient was encouraged to utilize her oxygen routinely.    Orders:    Follow Up In [facility] - PCP - Established    Portable Oxygen Condenser    Hypoxemia    Patient reports that she is unable to tote her oxygen tank around.   We would like to see if she is able to receive a portable oxygen tank.     Orders:    Portable Oxygen Condenser    Primary hypertension  BP appears mildly low today.  She has been off of her medication since her illness began.  At this point we will have her remain off of her antihypertensive therapy until seen for follow-up 2/17/2025       Orders:    Follow Up In [facility] - PCP - Established    Hyperlipidemia, mixed    Patient will continue with the current medication.    Dietary changes, exercise, and maintenance of healthy weight were discussed at length.    Orders:    Follow Up In [facility] - PCP - Established    Gastroesophageal reflux disease without esophagitis    Stable with the current treatment plan.  Continue current medication and appropriate dietary changes.    Orders:    Follow Up In [facility] - PCP - Established    Vitamin D deficiency    Stable based on last labs.  Continue the current dose of vitamin D supplementation.    Orders:    Follow Up In [facility] - PCP - Established    Age-related osteoporosis without current pathological fracture    Has tolerated Reclast well.  Next Reclast infusion is due 4/2025.  Last DEXA was 8/12/2024.  10-year Fracture Risk:  Major Osteoporotic Fracture  23.2  Hip Fracture                        7.2    Orders:    Follow Up In [facility] - PCP - Established    Chronic  cough    Patient reports today with a cough.     Orders:    brompheniramine-pseudoeph-DM 2-30-10 mg/5 mL syrup; Take 5 mL by mouth 4 times a day as needed for allergies for up to 10 days.          MCAW DUE 2/2025  DEXA DUE 8/13/2026  MAMM DUE 3/6/2025  COLOGUARD (6/28/24 Sample not collected properly)  COLONOSCOPY (ordered today)  RECLAST DUE 4/29/2025      Follow up in two weeks.     Scribe Attestation  By signing my name below, IVandana Scribe   attest that this documentation has been prepared under the direction and in the presence of Alber Damian DO.    Requested Prescriptions     Signed Prescriptions Disp Refills    brompheniramine-pseudoeph-DM 2-30-10 mg/5 mL syrup 120 mL 1     Sig: Take 5 mL by mouth 4 times a day as needed for allergies for up to 10 days.     Orders Placed This Encounter   Procedures    Portable Oxygen Condenser    C. difficile, PCR    CBC and Auto Differential    Basic Metabolic Panel

## 2025-02-03 NOTE — ASSESSMENT & PLAN NOTE
She was provided with home oxygen after her oxygen saturation was found to be low during her hospitalization.  It is low here in the office again but she did not bring her oxygen.  We will try to order her an oxygen concentrator which would be more light weight for him to carry.  Patient was encouraged to utilize her oxygen routinely.    Orders:    Follow Up In [facility] - PCP - Established    Portable Oxygen Condenser

## 2025-02-03 NOTE — ASSESSMENT & PLAN NOTE
Has tolerated Reclast well.  Next Reclast infusion is due 4/2025.  Last DEXA was 8/12/2024.  10-year Fracture Risk:  Major Osteoporotic Fracture  23.2  Hip Fracture                        7.2    Orders:    Follow Up In [facility] - PCP - Established

## 2025-02-03 NOTE — PATIENT INSTRUCTIONS
We will try to order the portable oxygen for you and an [employer] oxygen concentrator.    We will recheck the  stool for the infection to be sure it has cleared.    Follow up in 2 weeks    It was a pleasure to see you today. Thank you for choosing us for your health care needs.    If you have lab or other testing completed and have not been informed of results within one week, please call the office for your results.    If you haven't done so, consider signing up for [facility] [employer], the [facility] personal health record. Ask the staff how you can get started.

## 2025-02-04 ENCOUNTER — PATIENT OUTREACH (OUTPATIENT)
Dept: PRIMARY CARE | Facility: CLINIC | Age: 70
End: 2025-02-04
Payer: MEDICARE

## 2025-02-04 LAB
ANION GAP SERPL CALCULATED.4IONS-SCNC: 15 MMOL/L (CALC) (ref 7–17)
BASOPHILS # BLD AUTO: 146 CELLS/UL (ref 0–200)
BASOPHILS NFR BLD AUTO: 1 %
BUN SERPL-MCNC: 13 MG/DL (ref 7–25)
BUN/CREAT SERPL: ABNORMAL (CALC) (ref 6–22)
CALCIUM SERPL-MCNC: 9.4 MG/DL (ref 8.6–10.4)
CHLORIDE SERPL-SCNC: 95 MMOL/L (ref 98–110)
CO2 SERPL-SCNC: 33 MMOL/L (ref 20–32)
CREAT SERPL-MCNC: 1.05 MG/DL (ref 0.5–1.05)
EGFRCR SERPLBLD CKD-EPI 2021: 58 ML/MIN/1.73M2
EOSINOPHIL # BLD AUTO: 0 CELLS/UL (ref 15–500)
EOSINOPHIL NFR BLD AUTO: 0 %
ERYTHROCYTE [DISTWIDTH] IN BLOOD BY AUTOMATED COUNT: 11.9 % (ref 11–15)
GLUCOSE SERPL-MCNC: 154 MG/DL (ref 65–99)
HCT VFR BLD AUTO: 41.9 % (ref 35–45)
HGB BLD-MCNC: 13.3 G/DL (ref 11.7–15.5)
LYMPHOCYTES # BLD AUTO: 1314 CELLS/UL (ref 850–3900)
LYMPHOCYTES NFR BLD AUTO: 9 %
MCH RBC QN AUTO: 28.9 PG (ref 27–33)
MCHC RBC AUTO-ENTMCNC: 31.7 G/DL (ref 32–36)
MCV RBC AUTO: 91.1 FL (ref 80–100)
METAMYELOCYTES # BLD: 146 CELLS/UL
METAMYELOCYTES NFR BLD MANUAL: 1 %
MONOCYTES # BLD AUTO: 438 CELLS/UL (ref 200–950)
MONOCYTES NFR BLD AUTO: 3 %
MYELOCYTES # BLD: 1168 CELLS/UL
MYELOCYTES NFR BLD MANUAL: 8 %
NEUTROPHILS # BLD AUTO: ABNORMAL CELLS/UL (ref 1500–7800)
NEUTROPHILS NFR BLD AUTO: 75 %
NEUTS BAND # BLD: 438 CELLS/UL (ref 0–750)
NEUTS BAND NFR BLD MANUAL: 3 %
PLATELET # BLD AUTO: 862 THOUSAND/UL (ref 140–400)
PMV BLD REES-ECKER: 9.8 FL (ref 7.5–12.5)
POTASSIUM SERPL-SCNC: 5.4 MMOL/L (ref 3.5–5.3)
RBC # BLD AUTO: 4.6 MILLION/UL (ref 3.8–5.1)
SERVICE CMNT-IMP: ABNORMAL
SODIUM SERPL-SCNC: 143 MMOL/L (ref 135–146)
WBC # BLD AUTO: 14.6 THOUSAND/UL (ref 3.8–10.8)

## 2025-02-04 NOTE — PROGRESS NOTES
Call regarding appt. with PCP on (2/3/25) after hospitalization.  At time of outreach call the patient feels as if their condition has (improved) since last visit.  Pt reports she had more blood work and stool samples to leave and she will follow up with PCP again in 2 weeks. Reports her appt went good yesterday and Dr. Damian has ordered portable oxygen for her now as well.   Reviewed the PCP appointment with the pt and addressed any questions or concerns.

## 2025-02-06 ENCOUNTER — PATIENT MESSAGE (OUTPATIENT)
Dept: PRIMARY CARE | Facility: CLINIC | Age: 70
End: 2025-02-06
Payer: MEDICARE

## 2025-02-06 DIAGNOSIS — R19.7 DIARRHEA, UNSPECIFIED TYPE: Primary | ICD-10-CM

## 2025-02-06 LAB — C DIFF TOX GENS STL QL NAA+PROBE: NOT DETECTED

## 2025-02-10 VITALS
SYSTOLIC BLOOD PRESSURE: 72 MMHG | DIASTOLIC BLOOD PRESSURE: 38 MMHG | TEMPERATURE: 97.7 F | BODY MASS INDEX: 17.94 KG/M2 | WEIGHT: 89 LBS | HEIGHT: 59 IN | OXYGEN SATURATION: 87 % | HEART RATE: 130 BPM

## 2025-02-12 LAB
ATRIAL RATE: 98 BPM
P AXIS: 77 DEGREES
P OFFSET: 181 MS
P ONSET: 118 MS
PR INTERVAL: 204 MS
Q ONSET: 220 MS
QRS COUNT: 16 BEATS
QRS DURATION: 72 MS
QT INTERVAL: 390 MS
QTC CALCULATION(BAZETT): 497 MS
QTC FREDERICIA: 459 MS
R AXIS: 76 DEGREES
T AXIS: 88 DEGREES
T OFFSET: 415 MS
VENTRICULAR RATE: 98 BPM

## 2025-02-17 ENCOUNTER — APPOINTMENT (OUTPATIENT)
Dept: PRIMARY CARE | Facility: CLINIC | Age: 70
End: 2025-02-17
Payer: MEDICARE

## 2025-02-17 VITALS
HEART RATE: 100 BPM | TEMPERATURE: 97.2 F | OXYGEN SATURATION: 95 % | BODY MASS INDEX: 16.01 KG/M2 | WEIGHT: 79.4 LBS | SYSTOLIC BLOOD PRESSURE: 125 MMHG | HEIGHT: 59 IN | DIASTOLIC BLOOD PRESSURE: 84 MMHG | RESPIRATION RATE: 16 BRPM

## 2025-02-17 DIAGNOSIS — E78.2 HYPERLIPIDEMIA, MIXED: ICD-10-CM

## 2025-02-17 DIAGNOSIS — A04.72 C. DIFFICILE COLITIS: Primary | ICD-10-CM

## 2025-02-17 DIAGNOSIS — R73.9 HYPERGLYCEMIA: ICD-10-CM

## 2025-02-17 DIAGNOSIS — M81.0 AGE-RELATED OSTEOPOROSIS WITHOUT CURRENT PATHOLOGICAL FRACTURE: ICD-10-CM

## 2025-02-17 DIAGNOSIS — J44.9 CHRONIC OBSTRUCTIVE PULMONARY DISEASE, UNSPECIFIED COPD TYPE (MULTI): ICD-10-CM

## 2025-02-17 PROCEDURE — 1123F ACP DISCUSS/DSCN MKR DOCD: CPT | Performed by: FAMILY MEDICINE

## 2025-02-17 PROCEDURE — 3074F SYST BP LT 130 MM HG: CPT | Performed by: FAMILY MEDICINE

## 2025-02-17 PROCEDURE — 99212 OFFICE O/P EST SF 10 MIN: CPT | Performed by: FAMILY MEDICINE

## 2025-02-17 PROCEDURE — G2211 COMPLEX E/M VISIT ADD ON: HCPCS | Performed by: FAMILY MEDICINE

## 2025-02-17 PROCEDURE — 3079F DIAST BP 80-89 MM HG: CPT | Performed by: FAMILY MEDICINE

## 2025-02-17 PROCEDURE — 1159F MED LIST DOCD IN RCRD: CPT | Performed by: FAMILY MEDICINE

## 2025-02-17 PROCEDURE — 1111F DSCHRG MED/CURRENT MED MERGE: CPT | Performed by: FAMILY MEDICINE

## 2025-02-17 PROCEDURE — 1160F RVW MEDS BY RX/DR IN RCRD: CPT | Performed by: FAMILY MEDICINE

## 2025-02-17 PROCEDURE — 3008F BODY MASS INDEX DOCD: CPT | Performed by: FAMILY MEDICINE

## 2025-02-17 NOTE — PATIENT INSTRUCTIONS
We will refer you to pulmonology for evaluation of the COPD.    Continue to work on increasing your calories in order to gain weight.    Follow up in 3 months with labs to be done PRIOR.    It was a pleasure to see you today. Thank you for choosing us for your health care needs.    If you have lab or other testing completed and have not been informed of results within one week, please call the office for your results.    If you haven't done so, consider signing up for [facility] [email or web address], the [facility] personal health record. Ask the staff how you can get started.

## 2025-02-17 NOTE — PROGRESS NOTES
"Subjective   Patient ID: Little De La Cruz is a 69 y.o. female who presents for Follow-up.    HPI     Pt presents in office to follow up on C. Diff colitis.  She underwent antibiotic therapy and diarrhea has resolved.  Stool was negative for C. Diff on 2/5/2025.  Has an appointment with GI 5/5/2025.    Patient only takes the acid reflux medication at this time.  Patient reports only taking omeprazole occasionally as she felt she did not need to take it daily.       She has COPD.  10/24/2024:  Pt reports that she has not been taking the Trelegy due to cost.  She uses Albuterol as needed.  Denies any exacerbations.  Was followed by pulmonology, Dr. Roque, in the past but has not seen pulmonology for \"years.\"  She is using her portable oxygen.  She is waiting to receive a portable oxygen concentrator it will be easier for her to carry outside the home as it is significantly lighter.    She has hypertension.  Patient does not monitor BP at home.   Denies CP, dizziness, and LE edema.   Patient is compliant with antihypertensive therapy and denies any noted side effects.      She has hyperlipidemia.  Patient does not try to limit the amount of fatty foods and high cholesterol foods that he consumes  She is not on any lipid lowering medication at this time and has been treated with lifestyle modification and dietary changes.     She has vitamin D deficiency.  She currently is on a vitamin d supplement. Patient not able to recall the dose of the supplement.      Pt has GERD.  Her GERD is stable with omeprazole.   She denies any breakthrough reflux symptoms.      She has osteoporosis.  Pt is treated with Reclast and has tolerated it well.  Next Reclast infusion is due 4/29/2025.     She quit smoking in 2005.    Review of Systems    Cardiovascular: Patient denies any chest pain, shortness of breath with exertion, tachycardia, palpitations, orthopnea, or paroxysmal nocturnal dyspnea.    Respiratory: Patient denies any cough or " "shortness breath above her baseline.   Currently denies any wheezing.    Gastrointestinal: Patient denies any nausea, vomiting, diarrhea, constipation, melena, hematochezia, or reflux symptoms  Skin: Denies any rashes or skin lesions  Neurology: Patient denies any new motor or sensory losses. Denies any numbness, tingling, weakness, and incoordination of the extremities. Patient also denies any tremor, seizures, or gait instability.  Endocrinology: Denies any polyuria, polydipsia, polyphagia, or heat/cold intolerance.  Psychiatric: She denies any depression, anxiety, or suicidal/homicidal ideation.    Objective   /84   Pulse 100   Temp 36.2 °C (97.2 °F)   Resp 16   Ht 1.499 m (4' 11\")   Wt (!) 36 kg (79 lb 6.4 oz)   SpO2 95%   BMI 16.04 kg/m²     Physical Exam    General Appearance: Alert and cooperative, in no acute distress, well-developed/well-nourished underweight female.   Neck: Supple and without adenopathy or rigidity. There is no JVD at 90° and no carotid bruits are noted. There is no thyromegaly, thyroid tenderness, or palpable thyroid nodules.  Heart: Regular rate and rhythm without murmur or ectopy.  Lungs: Clear to auscultation bilaterally with good air exchange.  Skin: Good turgor, moist, warm and without rashes or lesions.  Neurological exam: Alert and oriented ×3, no tremor, normal gait.  Extremities: No clubbing, cyanosis, or edema  Psychiatric: Appropriate mood and affect, good insight and judgment, no delusions or thought disorders, no suicidal or homicidal ideation    Assessment/Plan       C. difficile colitis:  Completed antibiotic therapy and diarrhea has resolved.  Repeat stool testing was negative for C. difficile.  Patient was advised to increase caloric intake as we would like her to regain some weight.    Chronic obstructive pulmonary disease, unspecified COPD type (Multi) (Primary):  She was provided with home oxygen after her oxygen saturation was found to be low during her " hospitalization.  Patient presents with her new oxygen tank today at visit.   We will refer her back to pulmonology that she has not been seen for some time now.  - Referral to Pulmonology; Future    Hyperglycemia:  Glucose was elevated on previous labs.  We will reevaluate on her next labs.  Denies any polyuria or polydipsia.  - Basic Metabolic Panel; Future  - Hemoglobin A1C; Future      Hyperlipidemia, mixed:  LDL cholesterol is above goal on her last labs.  Appropriate dietary changes were discussed at length.  Exercise is going to be extremely limited given her COPD.  - Lipid Panel; Future  Lab Results   Component Value Date    CHOL 270 (H) 11/07/2024    CHOL 320 (H) 11/15/2023    CHOL 292 (H) 06/06/2022     Lab Results   Component Value Date    HDL 76.9 11/07/2024    HDL 84.2 11/15/2023    HDL 99.0 06/06/2022     Lab Results   Component Value Date    LDLCALC 165 (H) 11/07/2024    LDLCALC  11/15/2023      Comment:      The calculation of LDL and VLDL are inaccurate when the Triglycerides are greater than 400 mg/dL or when the patient is non-fasting. If LDL measurement is necessary contact the testing laboratory for an alternative LDL assay.                                  Near   Borderline      AGE      Desirable  Optimal    High     High     Very High     0-19 Y     0 - 109     ---    110-129   >/= 130     ----    20-24 Y     0 - 119     ---    120-159   >/= 160     ----      >24 Y     0 -  99   100-129  130-159   160-189     >/=190       Lab Results   Component Value Date    TRIG 140 11/07/2024    TRIG 403 (H) 11/15/2023    TRIG 123 06/06/2022       Osteoporosis:  She is going to need Reclast arranged in the future as she is overdue.          Follow up in three months with labs to be completed prior to visit.     Scribe Attestation  By signing my name below, IVandana Scribe   attest that this documentation has been prepared under the direction and in the presence of Alber Damian DO.    Orders  Placed This Encounter   Procedures    Basic Metabolic Panel    Hemoglobin A1C    Lipid Panel    Referral to Pulmonology

## 2025-02-28 ENCOUNTER — PATIENT OUTREACH (OUTPATIENT)
Dept: PRIMARY CARE | Facility: CLINIC | Age: 70
End: 2025-02-28
Payer: MEDICARE

## 2025-03-19 ENCOUNTER — APPOINTMENT (OUTPATIENT)
Dept: PULMONOLOGY | Facility: CLINIC | Age: 70
End: 2025-03-19
Payer: MEDICARE

## 2025-03-25 ENCOUNTER — PATIENT MESSAGE (OUTPATIENT)
Dept: PRIMARY CARE | Facility: CLINIC | Age: 70
End: 2025-03-25
Payer: MEDICARE

## 2025-03-25 DIAGNOSIS — J44.9 CHRONIC OBSTRUCTIVE PULMONARY DISEASE, UNSPECIFIED COPD TYPE (MULTI): Primary | ICD-10-CM

## 2025-03-25 NOTE — PROGRESS NOTES
Yecenia has requested additional information to order her oxygen concentrator.    Order and Face to Face visit from 2/3/2025 visit were faxed.

## 2025-04-02 ENCOUNTER — PATIENT OUTREACH (OUTPATIENT)
Dept: PRIMARY CARE | Facility: CLINIC | Age: 70
End: 2025-04-02
Payer: MEDICARE

## 2025-04-30 ENCOUNTER — APPOINTMENT (OUTPATIENT)
Dept: PULMONOLOGY | Facility: CLINIC | Age: 70
End: 2025-04-30
Payer: MEDICARE

## 2025-05-05 ENCOUNTER — APPOINTMENT (OUTPATIENT)
Dept: GASTROENTEROLOGY | Facility: CLINIC | Age: 70
End: 2025-05-05
Payer: MEDICARE

## 2025-05-17 DIAGNOSIS — R73.9 HYPERGLYCEMIA: ICD-10-CM

## 2025-05-17 DIAGNOSIS — E78.2 HYPERLIPIDEMIA, MIXED: ICD-10-CM

## 2025-05-27 ENCOUNTER — HOSPITAL ENCOUNTER (OUTPATIENT)
Dept: RADIOLOGY | Facility: HOSPITAL | Age: 70
Discharge: HOME | End: 2025-05-27
Payer: MEDICARE

## 2025-05-27 ENCOUNTER — OFFICE VISIT (OUTPATIENT)
Dept: ORTHOPEDIC SURGERY | Facility: CLINIC | Age: 70
End: 2025-05-27
Payer: MEDICARE

## 2025-05-27 ENCOUNTER — HOSPITAL ENCOUNTER (EMERGENCY)
Age: 70
Discharge: HOME | End: 2025-05-27
Payer: MEDICARE

## 2025-05-27 ENCOUNTER — APPOINTMENT (OUTPATIENT)
Dept: PRIMARY CARE | Facility: CLINIC | Age: 70
End: 2025-05-27
Payer: MEDICARE

## 2025-05-27 ENCOUNTER — APPOINTMENT (OUTPATIENT)
Dept: GENERAL RADIOLOGY | Age: 70
End: 2025-05-27
Payer: MEDICARE

## 2025-05-27 ENCOUNTER — APPOINTMENT (OUTPATIENT)
Dept: CT IMAGING | Age: 70
End: 2025-05-27
Payer: MEDICARE

## 2025-05-27 VITALS
OXYGEN SATURATION: 95 % | HEIGHT: 59 IN | SYSTOLIC BLOOD PRESSURE: 100 MMHG | BODY MASS INDEX: 15.42 KG/M2 | DIASTOLIC BLOOD PRESSURE: 60 MMHG | WEIGHT: 76.5 LBS | TEMPERATURE: 97.2 F | HEART RATE: 105 BPM

## 2025-05-27 VITALS
WEIGHT: 76 LBS | BODY MASS INDEX: 15.32 KG/M2 | TEMPERATURE: 97.5 F | RESPIRATION RATE: 16 BRPM | SYSTOLIC BLOOD PRESSURE: 121 MMHG | HEART RATE: 94 BPM | OXYGEN SATURATION: 100 % | DIASTOLIC BLOOD PRESSURE: 76 MMHG | HEIGHT: 59 IN

## 2025-05-27 DIAGNOSIS — S52.531A COLLES' FRACTURE OF RIGHT RADIUS, INIT FOR CLOS FX: Primary | ICD-10-CM

## 2025-05-27 DIAGNOSIS — M25.531 RIGHT WRIST PAIN: ICD-10-CM

## 2025-05-27 DIAGNOSIS — J44.9 CHRONIC OBSTRUCTIVE PULMONARY DISEASE, UNSPECIFIED COPD TYPE (MULTI): ICD-10-CM

## 2025-05-27 DIAGNOSIS — R55 SYNCOPE AND COLLAPSE: Primary | ICD-10-CM

## 2025-05-27 DIAGNOSIS — K21.9 GASTROESOPHAGEAL REFLUX DISEASE WITHOUT ESOPHAGITIS: ICD-10-CM

## 2025-05-27 DIAGNOSIS — Z00.00 MEDICARE ANNUAL WELLNESS VISIT, SUBSEQUENT: Primary | ICD-10-CM

## 2025-05-27 DIAGNOSIS — Z12.31 ENCOUNTER FOR SCREENING MAMMOGRAM FOR BREAST CANCER: ICD-10-CM

## 2025-05-27 DIAGNOSIS — E55.9 VITAMIN D DEFICIENCY: ICD-10-CM

## 2025-05-27 DIAGNOSIS — M81.0 AGE-RELATED OSTEOPOROSIS WITHOUT CURRENT PATHOLOGICAL FRACTURE: ICD-10-CM

## 2025-05-27 DIAGNOSIS — Z00.00 WELL ADULT EXAM: ICD-10-CM

## 2025-05-27 DIAGNOSIS — R63.4 WEIGHT LOSS: ICD-10-CM

## 2025-05-27 DIAGNOSIS — E78.2 HYPERLIPIDEMIA, MIXED: ICD-10-CM

## 2025-05-27 LAB
ALBUMIN SERPL-MCNC: 4.5 G/DL (ref 3.5–4.6)
ALP SERPL-CCNC: 77 U/L (ref 40–130)
ALT SERPL-CCNC: 12 U/L (ref 0–33)
ANION GAP SERPL CALCULATED.3IONS-SCNC: 12 MEQ/L (ref 9–15)
AST SERPL-CCNC: 23 U/L (ref 0–35)
BASOPHILS # BLD: 0 K/UL (ref 0–0.2)
BASOPHILS NFR BLD: 0.3 %
BILIRUB SERPL-MCNC: <0.2 MG/DL (ref 0.2–0.7)
BNP BLD-MCNC: 594 PG/ML
BUN SERPL-MCNC: 25 MG/DL (ref 8–23)
CALCIUM SERPL-MCNC: 9.4 MG/DL (ref 8.5–9.9)
CHLORIDE SERPL-SCNC: 96 MEQ/L (ref 95–107)
CO2 SERPL-SCNC: 29 MEQ/L (ref 20–31)
CREAT SERPL-MCNC: 0.86 MG/DL (ref 0.5–0.9)
EOSINOPHIL # BLD: 0.1 K/UL (ref 0–0.7)
EOSINOPHIL NFR BLD: 0.4 %
ERYTHROCYTE [DISTWIDTH] IN BLOOD BY AUTOMATED COUNT: 13 % (ref 11.5–14.5)
GLOBULIN SER CALC-MCNC: 3.5 G/DL (ref 2.3–3.5)
GLUCOSE SERPL-MCNC: 116 MG/DL (ref 70–99)
HCT VFR BLD AUTO: 37.4 % (ref 37–47)
HGB BLD-MCNC: 11.7 G/DL (ref 12–16)
LIPASE SERPL-CCNC: 118 U/L (ref 12–95)
LYMPHOCYTES # BLD: 0.9 K/UL (ref 1–4.8)
LYMPHOCYTES NFR BLD: 7.3 %
MAGNESIUM SERPL-MCNC: 2.3 MG/DL (ref 1.7–2.4)
MCH RBC QN AUTO: 29.1 PG (ref 27–31.3)
MCHC RBC AUTO-ENTMCNC: 31.3 % (ref 33–37)
MCV RBC AUTO: 93 FL (ref 79.4–94.8)
MONOCYTES # BLD: 0.5 K/UL (ref 0.2–0.8)
MONOCYTES NFR BLD: 4.1 %
NEUTROPHILS # BLD: 10.3 K/UL (ref 1.4–6.5)
NEUTS SEG NFR BLD: 87.3 %
PLATELET # BLD AUTO: 331 K/UL (ref 130–400)
POTASSIUM SERPL-SCNC: 4.6 MEQ/L (ref 3.4–4.9)
PROT SERPL-MCNC: 8 G/DL (ref 6.3–8)
RBC # BLD AUTO: 4.02 M/UL (ref 4.2–5.4)
SODIUM SERPL-SCNC: 137 MEQ/L (ref 135–144)
TROPONIN, HIGH SENSITIVITY: 12 NG/L (ref 0–19)
TROPONIN, HIGH SENSITIVITY: 13 NG/L (ref 0–19)
TROPONIN, HIGH SENSITIVITY: 16 NG/L (ref 0–19)
WBC # BLD AUTO: 11.8 K/UL (ref 4.8–10.8)

## 2025-05-27 PROCEDURE — 99397 PER PM REEVAL EST PAT 65+ YR: CPT | Performed by: FAMILY MEDICINE

## 2025-05-27 PROCEDURE — 1160F RVW MEDS BY RX/DR IN RCRD: CPT | Performed by: FAMILY MEDICINE

## 2025-05-27 PROCEDURE — 1159F MED LIST DOCD IN RCRD: CPT | Performed by: FAMILY MEDICINE

## 2025-05-27 PROCEDURE — G0439 PPPS, SUBSEQ VISIT: HCPCS | Performed by: FAMILY MEDICINE

## 2025-05-27 PROCEDURE — 36415 COLL VENOUS BLD VENIPUNCTURE: CPT

## 2025-05-27 PROCEDURE — 85025 COMPLETE CBC W/AUTO DIFF WBC: CPT

## 2025-05-27 PROCEDURE — 25605 CLTX DST RDL FX/EPHYS SEP W/: CPT | Performed by: ORTHOPAEDIC SURGERY

## 2025-05-27 PROCEDURE — 99214 OFFICE O/P EST MOD 30 MIN: CPT | Performed by: ORTHOPAEDIC SURGERY

## 2025-05-27 PROCEDURE — 80053 COMPREHEN METABOLIC PANEL: CPT

## 2025-05-27 PROCEDURE — 83735 ASSAY OF MAGNESIUM: CPT

## 2025-05-27 PROCEDURE — 83880 ASSAY OF NATRIURETIC PEPTIDE: CPT

## 2025-05-27 PROCEDURE — 1159F MED LIST DOCD IN RCRD: CPT | Performed by: ORTHOPAEDIC SURGERY

## 2025-05-27 PROCEDURE — 93005 ELECTROCARDIOGRAM TRACING: CPT

## 2025-05-27 PROCEDURE — 3074F SYST BP LT 130 MM HG: CPT | Performed by: FAMILY MEDICINE

## 2025-05-27 PROCEDURE — 1170F FXNL STATUS ASSESSED: CPT | Performed by: FAMILY MEDICINE

## 2025-05-27 PROCEDURE — 1036F TOBACCO NON-USER: CPT | Performed by: ORTHOPAEDIC SURGERY

## 2025-05-27 PROCEDURE — 71045 X-RAY EXAM CHEST 1 VIEW: CPT

## 2025-05-27 PROCEDURE — 3078F DIAST BP <80 MM HG: CPT | Performed by: FAMILY MEDICINE

## 2025-05-27 PROCEDURE — 73110 X-RAY EXAM OF WRIST: CPT | Mod: RIGHT SIDE | Performed by: STUDENT IN AN ORGANIZED HEALTH CARE EDUCATION/TRAINING PROGRAM

## 2025-05-27 PROCEDURE — 70450 CT HEAD/BRAIN W/O DYE: CPT

## 2025-05-27 PROCEDURE — 99214 OFFICE O/P EST MOD 30 MIN: CPT | Performed by: FAMILY MEDICINE

## 2025-05-27 PROCEDURE — 73110 X-RAY EXAM OF WRIST: CPT | Mod: RT

## 2025-05-27 PROCEDURE — 3008F BODY MASS INDEX DOCD: CPT | Performed by: FAMILY MEDICINE

## 2025-05-27 PROCEDURE — 99285 EMERGENCY DEPT VISIT HI MDM: CPT

## 2025-05-27 PROCEDURE — 83690 ASSAY OF LIPASE: CPT

## 2025-05-27 PROCEDURE — 84484 ASSAY OF TROPONIN QUANT: CPT

## 2025-05-27 ASSESSMENT — ENCOUNTER SYMPTOMS
DEPRESSION: 0
COLOR CHANGE: 0
COUGH: 0
LOSS OF SENSATION IN FEET: 0
VOMITING: 0
SHORTNESS OF BREATH: 1
NAUSEA: 0
ABDOMINAL PAIN: 0
WHEEZING: 0
OCCASIONAL FEELINGS OF UNSTEADINESS: 1
BACK PAIN: 0
RHINORRHEA: 0
SORE THROAT: 0
DIARRHEA: 0

## 2025-05-27 ASSESSMENT — ACTIVITIES OF DAILY LIVING (ADL)
BATHING: INDEPENDENT
TAKING_MEDICATION: INDEPENDENT
DOING_HOUSEWORK: NEEDS ASSISTANCE
DRESSING: INDEPENDENT
GROCERY_SHOPPING: NEEDS ASSISTANCE
MANAGING_FINANCES: INDEPENDENT

## 2025-05-27 ASSESSMENT — PAIN - FUNCTIONAL ASSESSMENT: PAIN_FUNCTIONAL_ASSESSMENT: 0-10

## 2025-05-27 ASSESSMENT — PAIN DESCRIPTION - LOCATION: LOCATION: WRIST;ARM

## 2025-05-27 ASSESSMENT — LIFESTYLE VARIABLES
HOW OFTEN DO YOU HAVE A DRINK CONTAINING ALCOHOL: NEVER
HOW MANY STANDARD DRINKS CONTAINING ALCOHOL DO YOU HAVE ON A TYPICAL DAY: PATIENT DOES NOT DRINK

## 2025-05-27 ASSESSMENT — PAIN DESCRIPTION - PAIN TYPE: TYPE: ACUTE PAIN

## 2025-05-27 ASSESSMENT — PAIN SCALES - GENERAL: PAINLEVEL_OUTOF10: 7

## 2025-05-27 NOTE — PROGRESS NOTES
History of Present Illness  Chief Complaint   Patient presents with    Right Wrist - Pain       Patient with substantial medical comorbidities most notably fairly advanced COPD and requires at least 4 L of nasal cannula oxygen constantly.  Patient appears in sustained somewhat of a syncopal fall likely due to low oxygen levels with onto the right wrist which resulted in pain and deformity.  Patient was seen by PCP this morning and recommended to come down to the fracture clinic.  Unfortunately on my evaluation of patient she became substantially lightheaded and began to faint.  She was awoken with stimulation with grabbing of the right wrist and manipulation of the distal radius fracture.  We obtained oxygen canister for patient as she had run out of oxygen and her portable device and improved with 8 L nasal cannula oxygen.    Medical History[1]    Medication Documentation Review Audit       Reviewed by Анна Boyd MA (Medical Assistant) on 25 at 1453      Medication Order Taking? Sig Documenting Provider Last Dose Status   albuterol 2.5 mg /3 mL (0.083 %) nebulizer solution [id number] No Take 3 mL (2.5 mg) by nebulization 4 times a day as needed for wheezing or shortness of breath. Vivian Marie PA-C Past Week  25 2359   albuterol 90 mcg/actuation inhaler [id number] No Inhale 2 puffs every 4 hours if needed for wheezing or shortness of breath.   Patient not taking: Reported on 2025    Alber Damian DO Past Week Active   cholecalciferol (Vitamin D-3) 25 MCG (1000 UT) capsule [id number] No Take 1 capsule (25 mcg) by mouth once daily.   Patient not taking: Reported on 2/3/2025    Historical Provider, MD Unknown Active   magnesium oxide (Mag-Ox) 400 mg (241.3 mg magnesium) tablet [id number] No Take 1 tablet (400 mg) by mouth once daily.   Patient not taking: Reported on 2/3/2025    Alber Damian DO Past Month Active   nebulizer accessories (Reusable Nebulizer Kit) kit [id number]  Use  nebulizer every 6 hours as needed.   Patient not taking: Reported on 2/3/2025    Vivian Marie PA-C  Active   nebulizer and compressor device [id number]  1 Device every 6 hours if needed (sob or wheezint).   Patient not taking: Reported on 2/3/2025    Vivian Marie PA-C  Active   omeprazole (PriLOSEC) 20 mg DR capsule [id number] No Take 1 capsule (20 mg) by mouth once daily. Alber Damian DO Past Week Active   predniSONE (Deltasone) 20 mg tablet [id number]  Take 3 tablets for 2 days, then 2 tablets for 2 days, then 1 tablet for 2 days   Patient not taking: Reported on 2/3/2025    Alber Damina DO  Active   sodium,potassium,mag sulfates (Suprep) 17.5-3.13-1.6 gram solution [id number]  Take one bottle twice as directed by the prep instructions   Patient not taking: Reported on 2/3/2025    Kathy Mcgill MD  Active   Patient not taking:  Discontinued 25 1349   zoledronic acid (Reclast) 5 mg/100 mL piggyback [id number]  Infuse 100 mL (5 mg) at 400 mL/hr over 15 minutes into a venous catheter 1 time for 1 dose. Alber Damian DO   23 2359                    RX Allergies[2]    Social History     Socioeconomic History    Marital status:      Spouse name: Not on file    Number of children: Not on file    Years of education: Not on file    Highest education level: Not on file   Occupational History    Not on file   Tobacco Use    Smoking status: Former     Current packs/day: 1.00     Average packs/day: 1 pack/day for 38.4 years (38.4 ttl pk-yrs)     Types: Cigarettes     Start date:     Smokeless tobacco: Never   Vaping Use    Vaping status: Never Used   Substance and Sexual Activity    Alcohol use: Not Currently     Comment: not recently    Drug use: Never    Sexual activity: Defer   Other Topics Concern    Not on file   Social History Narrative    Not on file     Social Drivers of Health     Financial Resource Strain: Low Risk  (2025)    Overall Financial Resource Strain  (CARDIA)     Difficulty of Paying Living Expenses: Not very hard   Food Insecurity: No Food Insecurity (1/19/2025)    Hunger Vital Sign     Worried About Running Out of Food in the Last Year: Never true     Ran Out of Food in the Last Year: Never true   Transportation Needs: No Transportation Needs (1/19/2025)    PRAPARE - Transportation     Lack of Transportation (Medical): No     Lack of Transportation (Non-Medical): No   Physical Activity: Not on file   Stress: Not on file   Social Connections: Not on file   Intimate Partner Violence: Not At Risk (1/19/2025)    Humiliation, Afraid, Rape, and Kick questionnaire     Fear of Current or Ex-Partner: No     Emotionally Abused: No     Physically Abused: No     Sexually Abused: No   Housing Stability: Low Risk  (1/19/2025)    Housing Stability Vital Sign     Unable to Pay for Housing in the Last Year: No     Number of Times Moved in the Last Year: 0     Homeless in the Last Year: No       Surgical History[3]         Review of Systems   GENERAL: Negative for malaise, significant weight loss, fever  MUSCULOSKELETAL: see HPI  NEURO:  Negative      Exam  Right upper extremity: Patient has a deformity appreciate about the right wrist with radial deviation and shortening consistent with a distal radius fracture.  Patient is tender to palpation in this region however remains neurovascular intact distally.  Palpable       Imaging  ECG 12 lead  Sinus rhythm with Premature atrial complexes  ST & T wave abnormality, consider inferior ischemia  Abnormal ECG  No previous ECGs available  See ED provider note for full interpretation and clinical correlation  Confirmed by Maggy Roman ([id number]) on 2/12/2025 8:57:47 PM    Procedure note:    SPLINTING / CASTING / STRAPPING [[id number]]    Date/Time: 5/27/2025 4:40 PM    Performed by: Wilmar Syed MD  Authorized by: Wilmar Syed MD    Consent:     Consent obtained:  Verbal    Consent given by:  Patient    Risks, benefits, and  alternatives were discussed: yes      Risks discussed:  Numbness, pain and swelling    Alternatives discussed:  No treatment  Universal protocol:     Procedure explained and questions answered to patient or proxy's satisfaction: yes      Imaging studies available: yes      Immediately prior to procedure a time out was called: yes      Patient identity confirmed:  Verbally with patient  Pre-procedure details:     Distal neurologic exam:  Normal    Distal perfusion: distal pulses strong and brisk capillary refill    Procedure details:     Location:  Wrist    Wrist location:  R wrist    Strapping: no      Splint type:  Radial gutter    Supplies:  Fiberglass    Attestation: Splint applied and adjusted personally by me    Post-procedure details:     Distal neurologic exam:  Normal    Distal perfusion: distal pulses strong      Procedure completion:  Tolerated well, no immediate complications    Post-procedure imaging: not applicable            Assessment  Comminuted right distal radius and ulna fracture     Plan  Patient was substantial medical comorbidities and ultimately recommended transfer to the emergency department given patient's substantial COPD exacerbation and episode of near fainting.  Patient was placed into an appropriately molded radial gutter splint and recommendation is going to be for nonoperative management with transition to casting after several weeks to allow for swelling to defervesce.  No lifting pulling or pushing with the right upper extremity.  This can progress toward a nonunion and almost certainly result in the malunion which will result in decreased  strength however general range of motion and function of the hand should be adequate for patient's function.  We discussed risk benefits and alternatives in regards to both operative and nonoperative management and patient elected to pursue this treatment option.  Will see patient back in 2 weeks for repeat clinical and radiographic  assessment    In 2 weeks with repeat two-view right wrist out of splint         [1]   Past Medical History:  Diagnosis Date    Acute upper respiratory infection, unspecified 09/05/2018    URI, acute    Arthritis     Cataract     Chronic obstructive pulmonary disease, unspecified 11/15/2022    COPD (chronic obstructive pulmonary disease)    Essential (primary) hypertension 11/15/2022    Hypertension    Gastro-esophageal reflux disease without esophagitis 11/15/2022    GERD (gastroesophageal reflux disease)    Osteoporosis     Shortness of breath     states being evaluated for oxygen use    Wears glasses    [2]   Allergies  Allergen Reactions    Codeine Nausea Only   [3]   Past Surgical History:  Procedure Laterality Date    APPENDECTOMY      INCISIONAL BREAST BIOPSY  10/03/2015    Incisional Breast Biopsy-right breast    TONSILLECTOMY      TUBAL LIGATION

## 2025-05-27 NOTE — PROGRESS NOTES
"Subjective   Reason for Visit: Little De La Cruz is an 69 y.o. female here for a Medicare Wellness visit, annual physical, and follow up monitoring and management of multiple medical conditions.      Past Medical, Surgical, and Family History reviewed and updated in chart.    Reviewed all medications by prescribing practitioner or clinical pharmacist (such as prescriptions, OTCs, herbal therapies and supplements) and documented in the medical record.    HPI      PATIENT C/O FALLING REACHING FOR THE LIGHT SWITCH IN THE BATHROOM HITTING HER HEAD AND WRIST THIS MORNING (5/27/2025).  PATIENT C/O PAIN IN HER RIGHT WRIST WITH NOTABLE SWELLING.    PATIENT REPORTS TAKING TYLENOL.  It provided minimal relief.    Pt has lost 3 pounds since last office visit.   Pt is drinking protein drinks daily to aid in weight gain.   Pt states she is no longer having diarrhea.     Pt has an appt with Pulmonology 6/9/2025.  LAST RECLAST 12/2023        No recent BW  Colonoscopy : ordered     She has COPD.  10/24/2024:  Pt reports that she has not been taking the Trelegy due to cost.  She uses Albuterol as needed.  Denies any exacerbations.  Was followed by pulmonology, Dr. Roque, in the past but has not seen pulmonology for \"years.\"  She is using her oxygen around the clock.     She has hypertension.  Patient does not monitor BP at home.   Denies CP, dizziness, and LE edema.   Patient is compliant with antihypertensive therapy and denies any noted side effects.      She has hyperlipidemia.  Patient does not try to limit the amount of fatty foods and high cholesterol foods that he consumes  She is not on any lipid lowering medication at this time and has been treated with lifestyle modification and dietary changes.     She has vitamin D deficiency.  She currently is on a vitamin d supplement. Patient not able to recall the dose of the supplement.      Pt has GERD.  Her GERD is stable with omeprazole.   She denies any breakthrough reflux symptoms. "      She has osteoporosis.  Pt is treated with Reclast and has tolerated it well.  Next Reclast infusion is due 4/29/2025.     She quit smoking in 2005.         Patient Self Assessment of Health Status  Patient Self Assessment: Poor    Nutrition and Exercise  Current Diet: Well Balanced Diet  Adequate Fluid Intake: Yes  Caffeine: Yes  Exercise Frequency: No Exercise    Functional Ability/Level of Safety  Cognitive Impairment Observed: No cognitive impairment observed  Cognitive Impairment Reported: No cognitive impairment reported by patient or family    Home Safety Risk Factors: None    Patient Care Team:  Alber Damian DO as PCP - General  Alber Damian DO as PCP - [employer] PCP       Review of Systems  Constitutional: Patient denies any fever, chills, loss of appetite, or unexplained weight loss.  HEENT: Denies any headache, sore throat, eye pain, ear pain, decreased vision, or decreased hearing. Patient also denies any rhinorrhea.  Cardiovascular: Patient denies any chest pain, shortness of breath with exertion, tachycardia, palpitations, orthopnea, or paroxysmal nocturnal dyspnea.  Respiratory: Patient denies any cough, shortness of breath, or wheezing.  Gastrointestinal: Patient denies any nausea, vomiting, diarrhea, constipation, melena, hematochezia, or reflux symptoms    Musculoskeletal:   (+) for pain and swelling of right wrist.    Skin: Denies any rashes or skin lesions  Neurology: Patient denies any new motor or sensory losses. Denies any numbness, tingling, weakness, and incoordination of the extremities. Patient also denies any tremor, seizures, or gait instability.  Endocrinology: Denies any polyuria, polydipsia, polyphagia, or heat/cold intolerance.  Psychiatric: Denies any anxiety, depression, or suicidal/homicidal ideation.  Hematology: Patient denies any abnormal bruising or bleeding.      SEE HPI ALSO, ROS is otherwise negative if not noted.      Objective   Vitals:  /60  "(BP Location: Right arm, Patient Position: Sitting, BP Cuff Size: Child)   Pulse 105   Temp 36.2 °C (97.2 °F) (Temporal)   Ht 1.499 m (4' 11\")   Wt (!) 34.7 kg (76 lb 8 oz)   SpO2 95%   BMI 15.45 kg/m²       Physical Exam  Gen. Appearance: Alert and cooperative, no acute distress, well-developed/well-nourished underweight female seated in a manual wheelchair.   Using portable oxygen concentrator.    Head: Normocephalic and atraumatic  EENT: Pupils are equal round reactive to light, extraocular muscles are intact, mucous membranes are moist, external auditory canals and tympanic membranes are within normal limits bilaterally, pharynx is without erythema or exudate, there is no noted rhinorrhea.  Neck: Supple and without adenopathy, no JVD at 90° and no carotid bruits are noted. There is no thyromegaly, thyroid tenderness, or palpable thyroid nodules.  Cardiovascular: Regular rate and rhythm without murmur or ectopy.  Respiratory: Clear to auscultation bilaterally with good air exchange.  Abdomen: Soft, nontender/nondistended. No masses, guarding, rebound, or rigidity. No hepatosplenomegaly, abdominal bruits, or CVA tenderness. Bowel sounds are normal. There is no widening of the aortic pulsation.    Musculoskeletal: Examination of the right wrist reveals swelling dorsally with radial deviation. Area is tender to palpation.    Skin: Good turgor, moist, warm and without rashes or lesions.  Lymph nodes: No cervical, clavicular, or inguinal adenopathy.  Neurological exam: Alert and oriented ×3, no tremor, normal gait.  Psychiatric: Appropriate mood and affect, good insight and judgment, no delusions or thought disorders, no suicidal or homicidal ideation.  Extremities: No clubbing, cyanosis, or edema    Assessment/Plan     MEDICARE ANNUAL WELLNESS EXAM / ANNUAL PHYSICAL: Appropriate screenings for the patient's current age were discussed at length.  I encouraged a low-fat/low-cholesterol diet and routine " exercise.       Hyperlipidemia, mixed:  Stable based on labs.  Patient will continue with the current conservative care.    Dietary changes, exercise, and maintenance of healthy weight were discussed at length.  Lab Results   Component Value Date    CHOL 270 (H) 11/07/2024    HDL 76.9 11/07/2024    LDLCALC 165 (H) 11/07/2024    TRIG 140 11/07/2024    CHHDL 3.5 11/07/2024    VLDL 28 11/07/2024    NHDL 193 (H) 11/07/2024       Gastroesophageal reflux disease without esophagitis:  Stable with the current treatment plan.  No breakthrough reflux symptoms.     Chronic obstructive pulmonary disease, unspecified COPD type:  Stable based on symptoms.  Continue current treatment.  She is to continue her oxygen continuously.      5/27/2025:   SHE IS SCHEDULED TO SEE PULMONOLOGY NEXT WEEK.    Vitamin D deficiency:  Vitamin D remains low on her last labs.  We will continue the current vitamin D supplementation.  Lab Results   Component Value Date    VITD25 13 (L) 11/07/2024    VITD25 19 (L) 11/15/2023    VITD25 20 (A) 06/06/2022       Age-related osteoporosis without current pathological fracture:  Has tolerated Reclast well.  Last DEXA was 8/12/2024.  10-year Fracture Risk:  Major Osteoporotic Fracture  23.2  Hip Fracture                        7.2  5/27/2025:  Reclast due 4/2025 (OVERDUE) and will be reordered.     Weight loss:  6/28/2024 Cologuard sample not collected properly and testing not completed.  Cologuard was re-ordered but never completed.  10/24/2024:  Pt has had some noted unintentional weight loss.  We discussed the need for colon cancer screening again at length.  She agreed to proceed with a colonoscopy and that was ordered today.  CT scan of chest and TSH were ordered for further evaluation.  Lab Results   Component Value Date    TSH 2.93 11/07/2024   CT of chest noted some chronic findings and follow-up with pulmonology as recommended.  CT of the Abdomen / Pelvis done 1/18/2025 revealed some liquid filled  colon but no definitive cause for the weight loss.  5/27/2025: She has lost an additional 3 pounds since her 2/17/2025 visit. Referred her to GI for a colonoscopy to further evaluate the weight loss.      Encounter for screening mammogram for breast cancer:  Ordered mammogram screening.     Right wrist pain:  Advised pt to go directly to the 1st floor Orthopedic walk-in clinic for evaluation of the right wrist as there is high suspicion for a right wrist fracture given the noted deformity.           MCAW DUE 5/2026  DEXA DUE 8/13/2026  MAMM OVERDUE 3/6/2025  COLOGUARD (6/28/24 Sample not collected properly)  COLONOSCOPY (ordered but not completed)  RECLAST DUE 4/29/2025       Scribe Attestation  By signing my name below, I, Jhonatan Quinonez , Angela   attest that this documentation has been prepared under the direction and in the presence of Alber Damian DO.    Orders Placed This Encounter   Procedures    BI mammo bilateral screening tomosynthesis    Referral to Gastroenterology       CONTINUE THE CURRENT MEDICATIONS WITHOUT CHANGE:  Current Outpatient Medications   Medication Instructions    albuterol 90 mcg/actuation inhaler 2 puffs, inhalation, Every 4 hours PRN    cholecalciferol (VITAMIN D-3) 25 mcg, Daily    fluticasone-umeclidin-vilanter (Trelegy Ellipta) 100-62.5-25 mcg blister with device 1 puff, inhalation, Daily, Rinse mouth with water after use to reduce aftertaste and incidence of candidiasis. Do not swallow.    ipratropium-albuteroL (Duo-Neb) 0.5-2.5 mg/3 mL nebulizer solution 3 mL, nebulization, Every 6 hours PRN    magnesium oxide (MAG-OX) 400 mg, oral, Daily    nebulizer accessories (Reusable Nebulizer Kit) kit Use nebulizer every 6 hours as needed.    nebulizer and compressor device 1 Device, miscellaneous, Every 6 hours PRN    omeprazole (PRILOSEC) 20 mg, oral, Daily

## 2025-05-27 NOTE — ED TRIAGE NOTES
Patient presents via EMS from orthopedic office. Patient fell at home this morning and injured her right forearm/wrist. Patient then went to her scheduled PCP appointment. She was then sent to xrays and to see the ortho office. While patient was having her right arm splinted, she passed out. Patient does not remember events leading up to syncopal. Patient arrives alert and oriented. Skin pink, warm, and dry. No distress noted on arrival. Splint noted to right arm. MSPs intact.

## 2025-05-27 NOTE — PATIENT INSTRUCTIONS
I have referred you for a colonoscopy due to the weight loss.  Please increase your calorie intake to prevent any further weight loss.    Have labs done soon that were previously ordered.    See pulmonology next week.    See orthopedics today for the right wrist pain as I fear you have fractured the wrist.      Follow up in 3 months.    It was a pleasure to see you today. Thank you for choosing us for your health care needs.    If you have lab or other testing completed and have not been informed of results within one week, please call the office for your results.    If you haven't done so, consider signing up for [facility] [employer], the [facility] personal health record. Ask the staff how you can get started.

## 2025-05-27 NOTE — ED PROVIDER NOTES
for the following components:    Glucose 116 (*)     BUN 25 (*)     All other components within normal limits   LIPASE - Abnormal; Notable for the following components:    Lipase 118 (*)     All other components within normal limits   MAGNESIUM   BRAIN NATRIURETIC PEPTIDE   TROPONIN   TROPONIN   TROPONIN       All other labs were within normal range or not returned as of this dictation.    EMERGENCY DEPARTMENT COURSE and DIFFERENTIAL DIAGNOSIS/MDM:   Vitals:    Vitals:    25 1625 25 1700 25 1723 25 1730   BP: (!) 130/90 (!) 96/57  116/66   Pulse: (!) 107 (!) 107 80 76   Resp:  19   Temp: 97.5 °F (36.4 °C)      TempSrc: Oral      SpO2: 98% 91% 100% 100%   Weight: 34.5 kg (76 lb)      Height: 1.499 m (4' 11\")          69 year old female with history of COPD and HTN who presents to the emergency department for syncope episode at [facility]. Patient was seen for right wrist fracture and wrist was splinted in there office. While at [facility], patient's battery  and she was out of oxygen for 45 minutes or more. She normally wears 4L at home. Patient states that she started to feel lightheaded and put her head in her hands and the next thing she remembers is her sister tapping her saying her name. When EMS arrived she was on 4L of oxygen and 100%. She did not fall during syncope episode but she did have a fall earlier this morning, injuring wrist and hitting head. Patient states that she was reaching for light switch and tripped and fell. She is hemodynamically stable, non toxic appearing, and afebrile. Lung sounds clear. Neuro exam benign. Pulses 2+, cap refill less then 2 seconds. She denies numbness, tingling, or loss of sensation in extremities. NSR, no ST elevation, no ectopy. CT of head read by radiologist as no acute intracranial abnormality. X-ray read by radiologist as: no acute process. Lab work unremarkable. Patient dropped to 88% on room air in ER, but remains

## 2025-05-28 LAB
EKG ATRIAL RATE: 96 BPM
EKG DIAGNOSIS: NORMAL
EKG P AXIS: 74 DEGREES
EKG P-R INTERVAL: 164 MS
EKG Q-T INTERVAL: 364 MS
EKG QRS DURATION: 64 MS
EKG QTC CALCULATION (BAZETT): 459 MS
EKG R AXIS: 71 DEGREES
EKG T AXIS: 83 DEGREES
EKG VENTRICULAR RATE: 96 BPM

## 2025-05-28 PROCEDURE — 93010 ELECTROCARDIOGRAM REPORT: CPT | Performed by: INTERNAL MEDICINE

## 2025-06-09 ENCOUNTER — APPOINTMENT (OUTPATIENT)
Dept: PULMONOLOGY | Facility: CLINIC | Age: 70
End: 2025-06-09
Payer: MEDICARE

## 2025-06-09 VITALS
WEIGHT: 72 LBS | SYSTOLIC BLOOD PRESSURE: 82 MMHG | HEIGHT: 59 IN | DIASTOLIC BLOOD PRESSURE: 54 MMHG | HEART RATE: 132 BPM | BODY MASS INDEX: 14.52 KG/M2 | OXYGEN SATURATION: 93 % | TEMPERATURE: 96.9 F

## 2025-06-09 DIAGNOSIS — J98.19 RIGHT MIDDLE LOBE SYNDROME: ICD-10-CM

## 2025-06-09 DIAGNOSIS — R64 PULMONARY CACHEXIA DUE TO CHRONIC OBSTRUCTIVE PULMONARY DISEASE (MULTI): ICD-10-CM

## 2025-06-09 DIAGNOSIS — Z87.891 FORMER SMOKER: ICD-10-CM

## 2025-06-09 DIAGNOSIS — J44.9 CHRONIC OBSTRUCTIVE PULMONARY DISEASE, UNSPECIFIED COPD TYPE (MULTI): Primary | ICD-10-CM

## 2025-06-09 DIAGNOSIS — J44.9 PULMONARY CACHEXIA DUE TO CHRONIC OBSTRUCTIVE PULMONARY DISEASE (MULTI): ICD-10-CM

## 2025-06-09 PROCEDURE — 3008F BODY MASS INDEX DOCD: CPT | Performed by: STUDENT IN AN ORGANIZED HEALTH CARE EDUCATION/TRAINING PROGRAM

## 2025-06-09 PROCEDURE — 1036F TOBACCO NON-USER: CPT | Performed by: STUDENT IN AN ORGANIZED HEALTH CARE EDUCATION/TRAINING PROGRAM

## 2025-06-09 PROCEDURE — 99205 OFFICE O/P NEW HI 60 MIN: CPT | Performed by: STUDENT IN AN ORGANIZED HEALTH CARE EDUCATION/TRAINING PROGRAM

## 2025-06-09 PROCEDURE — 3078F DIAST BP <80 MM HG: CPT | Performed by: STUDENT IN AN ORGANIZED HEALTH CARE EDUCATION/TRAINING PROGRAM

## 2025-06-09 PROCEDURE — G2211 COMPLEX E/M VISIT ADD ON: HCPCS | Performed by: STUDENT IN AN ORGANIZED HEALTH CARE EDUCATION/TRAINING PROGRAM

## 2025-06-09 PROCEDURE — 1159F MED LIST DOCD IN RCRD: CPT | Performed by: STUDENT IN AN ORGANIZED HEALTH CARE EDUCATION/TRAINING PROGRAM

## 2025-06-09 PROCEDURE — 3074F SYST BP LT 130 MM HG: CPT | Performed by: STUDENT IN AN ORGANIZED HEALTH CARE EDUCATION/TRAINING PROGRAM

## 2025-06-09 RX ORDER — FLUTICASONE FUROATE, UMECLIDINIUM BROMIDE AND VILANTEROL TRIFENATATE 100; 62.5; 25 UG/1; UG/1; UG/1
1 POWDER RESPIRATORY (INHALATION) DAILY
Qty: 180 EACH | Refills: 3 | Status: SHIPPED | OUTPATIENT
Start: 2025-06-09

## 2025-06-09 RX ORDER — IPRATROPIUM BROMIDE AND ALBUTEROL SULFATE 2.5; .5 MG/3ML; MG/3ML
3 SOLUTION RESPIRATORY (INHALATION) EVERY 6 HOURS PRN
Qty: 360 ML | Refills: 11 | Status: SHIPPED | OUTPATIENT
Start: 2025-06-09

## 2025-06-09 RX ORDER — ALBUTEROL SULFATE 90 UG/1
2 INHALANT RESPIRATORY (INHALATION) EVERY 4 HOURS PRN
Qty: 25.5 G | Refills: 11 | Status: SHIPPED | OUTPATIENT
Start: 2025-06-09 | End: 2025-07-09

## 2025-06-09 ASSESSMENT — ENCOUNTER SYMPTOMS
OCCASIONAL FEELINGS OF UNSTEADINESS: 1
DEPRESSION: 0
WHEEZING: 1
COLOR CHANGE: 0
ABDOMINAL DISTENTION: 0
COUGH: 1
FEVER: 0
LOSS OF SENSATION IN FEET: 0
HEADACHES: 0
LIGHT-HEADEDNESS: 0
TROUBLE SWALLOWING: 0
BLOOD IN STOOL: 0
UNEXPECTED WEIGHT CHANGE: 0
NAUSEA: 0
VOMITING: 0
CONFUSION: 0
ACTIVITY CHANGE: 1
CHILLS: 0
DIFFICULTY URINATING: 0
DIZZINESS: 0
CONSTIPATION: 0
ARTHRALGIAS: 0
JOINT SWELLING: 0
FATIGUE: 1
ABDOMINAL PAIN: 0
SLEEP DISTURBANCE: 0
DIARRHEA: 0
SHORTNESS OF BREATH: 1
SPEECH DIFFICULTY: 0

## 2025-06-09 NOTE — PROGRESS NOTES
Department of Medicine  Division of Pulmonary, Critical Care, and Sleep Medicine  Consultation  [facility]    I was asked by Alber Damian DO, to evaluate Little De La Cruz for COPD. I have independently interviewed and examined the patient in the office and reviewed available records. The finalized note is available in the electronic medical record for review by the referring physician.     Physician HPI (6/9/2025):  Patient is a 69-year-old  female presenting to the clinic today for evaluation of severe COPD.  The patient was previously established with another provider and is looking for another opinion regarding management of her condition.  She is currently dependent on 5 L nasal cannula and is only able to ambulate a few feet before becoming short of breath.  She states at home that she wears her continuous home concentrator religiously.  She did recently fall at home, attempting to turn on a light switch in the middle of the night.  She reached out for the wall and was unable to obtain her balance, falling to her left side and fracturing her wrist.  She was offered operative intervention but because of a syncopal event having occurred in the orthopedic office after running out of oxygen, the surgeon waved off any surgical intervention.  Her current oxygen supplier is [employer].    Patient reports that she had been on several bronchodilators previously including Trelegy but reports that the medication was too expensive and that the medication caused her to have a coughing fit.  She is currently managing with albuterol as needed which is 3-4 times per day.  The patient was on steroids about a year and a half ago but is symptomatic most hours out of the day.  She has been losing weight and her current BMI is 14.54.  She claims to have an appetite but her sister reports that she has not been eating well.  Patient currently resides at home alone although she has multiple family members that  live nearby.    Reviewed the patient's most recent CT imaging dated 11/7/2024 shows extensive bilateral pan lobar emphysema with multiple bilateral sub-5 mm nodules as well as complete right middle lobe collapse and atelectasis which has been chronic for more than 2 years now.    Immunization History:  Immunization History   Administered Date(s) Administered    Flu vaccine (IIV4), preservative free *Check age/dose* 09/25/2017, 10/18/2018    Flu vaccine, quadrivalent, high-dose, preservative free, age 65y+ (FLUZONE) 10/03/2020, 12/02/2022, 11/01/2023    Flu vaccine, trivalent, preservative free, HIGH-DOSE, age 65y+ (Fluzone) 10/16/2024    Flu vaccine, trivalent, preservative free, age 6 months and greater (Fluarix/Fluzone/Flulaval) 09/26/2016    Influenza, seasonal, injectable 10/10/2015    Moderna COVID-19 vaccine, 12 years and older (50mcg/0.5mL)(Spikevax) 11/01/2023    Moderna SARS-CoV-2 Vaccination 04/17/2021, 05/15/2021    Pfizer COVID-19 vaccine, 12 years and older, (30mcg/0.3mL) (Comirnaty) 10/16/2024    Pfizer Gray Cap SARS-CoV-2 05/12/2022    Pneumococcal conjugate vaccine, 20-valent (PREVNAR 20) 08/16/2022    RSV, 60 Years And Older (AREXVY) 11/01/2023    Zoster vaccine, recombinant, adult (SHINGRIX) 11/01/2023, 04/01/2024    Zoster, live 09/26/2016       Family History:  Family History[1]    Social History:  Social History[2]    Current Medications:  Current Outpatient Medications   Medication Instructions    albuterol 90 mcg/actuation inhaler 2 puffs, inhalation, Every 4 hours PRN    cholecalciferol (VITAMIN D-3) 25 mcg, Daily    fluticasone-umeclidin-vilanter (Trelegy Ellipta) 100-62.5-25 mcg blister with device 1 puff, inhalation, Daily, Rinse mouth with water after use to reduce aftertaste and incidence of candidiasis. Do not swallow.    ipratropium-albuteroL (Duo-Neb) 0.5-2.5 mg/3 mL nebulizer solution 3 mL, nebulization, Every 6 hours PRN    magnesium oxide (MAG-OX) 400 mg, oral, Daily    nebulizer  "accessories (Reusable Nebulizer Kit) kit Use nebulizer every 6 hours as needed.    nebulizer and compressor device 1 Device, miscellaneous, Every 6 hours PRN    omeprazole (PRILOSEC) 20 mg, oral, Daily    predniSONE (Deltasone) 20 mg tablet Take 3 tablets for 2 days, then 2 tablets for 2 days, then 1 tablet for 2 days    sodium,potassium,mag sulfates (Suprep) 17.5-3.13-1.6 gram solution Take one bottle twice as directed by the prep instructions        Drug Allergies/Intolerances:  RX Allergies[3]     Review of Systems:  Review of Systems   Constitutional:  Positive for activity change and fatigue. Negative for chills, fever and unexpected weight change.   HENT:  Negative for congestion and trouble swallowing.    Respiratory:  Positive for cough, shortness of breath and wheezing.    Cardiovascular:  Negative for chest pain.   Gastrointestinal:  Negative for abdominal distention, abdominal pain, blood in stool, constipation, diarrhea, nausea and vomiting.   Genitourinary:  Negative for difficulty urinating.   Musculoskeletal:  Negative for arthralgias and joint swelling.   Skin:  Negative for color change.   Neurological:  Negative for dizziness, speech difficulty, light-headedness and headaches.   Psychiatric/Behavioral:  Negative for confusion and sleep disturbance.         All other review of systems are negative and/or non-contributory.    Physical Examination:  BP 82/54   Pulse (!) 132   Temp 36.1 °C (96.9 °F)   Ht 1.499 m (4' 11\")   Wt (!) 32.7 kg (72 lb)   SpO2 93% Comment: 5L NC  BMI 14.54 kg/m²      Measured SpO2 is with ambient air at rest    Physical Exam  Constitutional:       General: She is awake.      Appearance: Normal appearance. She is cachectic.      Interventions: Nasal cannula in place.      Comments: Patient currently residing in a wheelchair   HENT:      Head: Normocephalic and atraumatic.      Nose: Nose normal.      Mouth/Throat:      Mouth: Mucous membranes are moist.   Eyes:      " Pupils: Pupils are equal, round, and reactive to light.   Neck:      Thyroid: No thyroid mass.      Trachea: Phonation normal.   Cardiovascular:      Rate and Rhythm: Regular rhythm. Tachycardia present.   Pulmonary:      Effort: Tachypnea and accessory muscle usage present. No respiratory distress.      Breath sounds: Decreased air movement present. Decreased breath sounds present. No wheezing, rhonchi or rales.      Comments: Currently on 4 L nasal cannula via POC  Abdominal:      General: Bowel sounds are normal. There is no distension.      Palpations: Abdomen is soft.      Tenderness: There is no abdominal tenderness.   Musculoskeletal:      Cervical back: Neck supple.      Right lower leg: No edema.      Left lower leg: No edema.   Skin:     General: Skin is warm.      Capillary Refill: Capillary refill takes less than 2 seconds.   Neurological:      General: No focal deficit present.      Mental Status: She is alert and oriented to person, place, and time. Mental status is at baseline.      GCS: GCS eye subscore is 4. GCS verbal subscore is 5. GCS motor subscore is 6.      Cranial Nerves: Cranial nerves 2-12 are intact.      Motor: Weakness and atrophy present.   Psychiatric:         Attention and Perception: Attention and perception normal.         Mood and Affect: Mood normal.         Speech: Speech normal.         Behavior: Behavior normal.         Pulmonary Function Test Results     Failed to redirect to the Timeline version of the [employer] SmartLink.      Chest Radiograph     XR chest 1 view 05/27/2025    Narrative  EXAMINATION:  ONE XRAY VIEW OF THE CHEST    5/27/2025 5:08 pm    COMPARISON:  09/23/2021    HISTORY:  ORDERING SYSTEM PROVIDED HISTORY: SOB  TECHNOLOGIST PROVIDED HISTORY:  Reason for exam:->SOB  What reading provider will be dictating this exam?->CRC  FINDINGS:  The lungs are without acute focal process.  There is no effusion or  pneumothorax. The cardiomediastinal silhouette is without acute  process. The  osseous structures are without acute process.    Impression  No acute process.      Echocardiogram     No results found for this or any previous visit from the past 365 days.       Chest CT Scan     No results found for this or any previous visit from the past 365 days.         Relevant Laboratory Tests       Assessment and Plan / Recommendations:  Problem List Items Addressed This Visit          Pulmonary and Pneumonias    COPD (chronic obstructive pulmonary disease) (Multi) - Primary    Relevant Medications    fluticasone-umeclidin-vilanter (Trelegy Ellipta) 100-62.5-25 mcg blister with device    albuterol 90 mcg/actuation inhaler    ipratropium-albuteroL (Duo-Neb) 0.5-2.5 mg/3 mL nebulizer solution  This patient has hallmark features of end-stage COPD.  In addition to the extensive emphysema seen on CT imaging, she is clearly losing weight as a consequence of her increased work of breathing.  We talked at length about the importance of remaining on consistent bronchodilator therapy.  I do think the most appropriate treatment for her is triple therapy.  If it is too expensive for the patient to use the Trelegy inhaler, I do think using a regimen entirely through the nebulizer would be appropriate.  I am going to provide a prescription for ipratropium/albuterol via the nebulizer in addition to the Trelegy.  Once the patient has been placed on appropriate bronchodilator therapy, the neck step would be to determine if she is a candidate for PDE 4/5 inhibition.  With this patient's history of C. difficile colitis I would not prescribe scheduled azithromycin for her.  Similarly, because of her osteoporosis I would not prescribe chronic daily prednisone.     Other Visit Diagnoses         Former smoker      This patient has a fairly extensive smoking history but quit about 20 years ago.  He would be reasonable to continue CT imaging to follow the pulmonary nodules however her ECOG status is fairly poor and  would not be a great candidate for systemic therapies.      Right middle lobe syndrome      Her right middle lobe has not been contributing to her overall respiratory function for at least 2 years now.  Unfortunately there is not much we can do to reexpand this area of her lung.      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  We did not have time to discuss goals of care or advance care planning but this is something that should take place.  Strongly recommend that the patient establish care with the palliative care medicine on an outpatient basis to help with symptom control.  Unclear to me how much longer she is going to be able to live by herself as her care needs are fairly high at the moment.             Follow-up: 1 month    Parts of this note may have been generated using voice dictation software, Dragon.  Homophonic errors may exist.  Please contact me directly if clarification is needed.    Kurt Carey MD, MSBS   Pulmonary/Critical Care  4:18 PM  06/09/25  [address] [address]  Office: [phone]  Fax: [phone]         [1]   Family History  Problem Relation Name Age of Onset    Breast cancer Sister      Breast cancer Father's Sister     [2]   Social History  Socioeconomic History    Marital status:    Tobacco Use    Smoking status: Former     Current packs/day: 1.00     Average packs/day: 1 pack/day for 38.4 years (38.4 ttl pk-yrs)     Types: Cigarettes     Start date: 1987    Smokeless tobacco: Never   Vaping Use    Vaping status: Never Used   Substance and Sexual Activity    Alcohol use: Not Currently     Comment: not recently    Drug use: Never    Sexual activity: Defer     Social Drivers of Health     Financial Resource Strain: Low Risk  (1/19/2025)    Overall Financial Resource Strain (CARDI)     Difficulty of Paying Living Expenses: Not very hard   Food  Insecurity: No Food Insecurity (1/19/2025)    Hunger Vital Sign     Worried About Running Out of Food in the Last Year: Never true     Ran Out of Food in the Last Year: Never true   Transportation Needs: No Transportation Needs (1/19/2025)    PRAPARE - Transportation     Lack of Transportation (Medical): No     Lack of Transportation (Non-Medical): No   Intimate Partner Violence: Not At Risk (1/19/2025)    Humiliation, Afraid, Rape, and Kick questionnaire     Fear of Current or Ex-Partner: No     Emotionally Abused: No     Physically Abused: No     Sexually Abused: No   Housing Stability: Low Risk  (1/19/2025)    Housing Stability Vital Sign     Unable to Pay for Housing in the Last Year: No     Number of Times Moved in the Last Year: 0     Homeless in the Last Year: No   [3]   Allergies  Allergen Reactions    Codeine Nausea Only

## 2025-06-10 ENCOUNTER — APPOINTMENT (OUTPATIENT)
Dept: ORTHOPEDIC SURGERY | Facility: CLINIC | Age: 70
End: 2025-06-10
Payer: MEDICARE

## 2025-06-10 ENCOUNTER — HOSPITAL ENCOUNTER (OUTPATIENT)
Dept: RADIOLOGY | Facility: HOSPITAL | Age: 70
Discharge: HOME | End: 2025-06-10
Payer: MEDICARE

## 2025-06-10 ENCOUNTER — APPOINTMENT (OUTPATIENT)
Dept: RADIOLOGY | Facility: HOSPITAL | Age: 70
End: 2025-06-10
Payer: MEDICARE

## 2025-06-10 DIAGNOSIS — S52.531A COLLES' FRACTURE OF RIGHT RADIUS, INIT FOR CLOS FX: ICD-10-CM

## 2025-06-10 DIAGNOSIS — S62.101D RIGHT WRIST FRACTURE, WITH ROUTINE HEALING, SUBSEQUENT ENCOUNTER: ICD-10-CM

## 2025-06-10 PROCEDURE — 99024 POSTOP FOLLOW-UP VISIT: CPT | Performed by: ORTHOPAEDIC SURGERY

## 2025-06-10 PROCEDURE — 73100 X-RAY EXAM OF WRIST: CPT | Mod: RT

## 2025-06-10 NOTE — PROGRESS NOTES
History of Present Illness  Chief Complaint   Patient presents with    Right Wrist - Follow-up     Xrays out of splint today       Patient has had difficulty with compliance regards to right wrist fracture.  Splint was removed today patient has had multiple medical issues and required admission for substantial diminished lung function in regards to progressive COPD.  Patient has also had issues in regards to nutrition.    Medical History[1]    Medication Documentation Review Audit       Reviewed by Rico Kellogg RN (Registered Nurse) on 06/09/25 at 1114      Medication Order Taking? Sig Documenting Provider Last Dose Status   albuterol 2.5 mg /3 mL (0.083 %) nebulizer solution [id number] No Take 3 mL (2.5 mg) by nebulization 4 times a day as needed for wheezing or shortness of breath. Vivian Marie PA-C Past Week Active   albuterol 90 mcg/actuation inhaler [id number] No Inhale 2 puffs every 4 hours if needed for wheezing or shortness of breath.   Patient not taking: Reported on 5/27/2025    Alber Damian DO Past Week Active   cholecalciferol (Vitamin D-3) 25 MCG (1000 UT) capsule [id number] No Take 1 capsule (25 mcg) by mouth once daily.   Patient not taking: Reported on 2/3/2025    Historical Provider, MD Unknown Active   magnesium oxide (Mag-Ox) 400 mg (241.3 mg magnesium) tablet [id number] No Take 1 tablet (400 mg) by mouth once daily.   Patient not taking: Reported on 2/3/2025    Alber Damian DO Past Month Active   nebulizer accessories (Reusable Nebulizer Kit) kit [id number]  Use nebulizer every 6 hours as needed.   Patient not taking: Reported on 2/3/2025    Vivian Marie PA-C  Active   nebulizer and compressor device [id number]  1 Device every 6 hours if needed (sob or wheezint).   Patient not taking: Reported on 2/3/2025    Vivian Marie PA-C  Active   omeprazole (PriLOSEC) 20 mg DR capsule [id number] No Take 1 capsule (20 mg) by mouth once daily. Alber Damian DO Past Week Active   predniSONE  (Deltasone) 20 mg tablet [id number]  Take 3 tablets for 2 days, then 2 tablets for 2 days, then 1 tablet for 2 days   Patient not taking: Reported on 2/3/2025    Alber Damian DO  Active   sodium,potassium,mag sulfates (Suprep) 17.5-3.13-1.6 gram solution [id number]  Take one bottle twice as directed by the prep instructions   Patient not taking: Reported on 2/3/2025    Kathy Mcgill MD  Active   Patient not taking:  Discontinued 05/27/25 1349                    RX Allergies[2]    Social History     Socioeconomic History    Marital status:      Spouse name: Not on file    Number of children: Not on file    Years of education: Not on file    Highest education level: Not on file   Occupational History    Not on file   Tobacco Use    Smoking status: Former     Current packs/day: 1.00     Average packs/day: 1 pack/day for 38.4 years (38.4 ttl pk-yrs)     Types: Cigarettes     Start date: 1987    Smokeless tobacco: Never   Vaping Use    Vaping status: Never Used   Substance and Sexual Activity    Alcohol use: Not Currently     Comment: not recently    Drug use: Never    Sexual activity: Defer   Other Topics Concern    Not on file   Social History Narrative    Not on file     Social Drivers of Health     Financial Resource Strain: Low Risk  (1/19/2025)    Overall Financial Resource Strain (CARDIA)     Difficulty of Paying Living Expenses: Not very hard   Food Insecurity: No Food Insecurity (1/19/2025)    Hunger Vital Sign     Worried About Running Out of Food in the Last Year: Never true     Ran Out of Food in the Last Year: Never true   Transportation Needs: No Transportation Needs (1/19/2025)    PRAPARE - Transportation     Lack of Transportation (Medical): No     Lack of Transportation (Non-Medical): No   Physical Activity: Not on file   Stress: Not on file   Social Connections: Not on file   Intimate Partner Violence: Not At Risk (1/19/2025)    Humiliation, Afraid, Rape, and Kick questionnaire     Fear of  Current or Ex-Partner: No     Emotionally Abused: No     Physically Abused: No     Sexually Abused: No   Housing Stability: Low Risk  (1/19/2025)    Housing Stability Vital Sign     Unable to Pay for Housing in the Last Year: No     Number of Times Moved in the Last Year: 0     Homeless in the Last Year: No       Surgical History[3]         Review of Systems   GENERAL: Negative for malaise, significant weight loss, fever  MUSCULOSKELETAL: see HPI  NEURO:  Negative      Exam  Right wrist: Gross deformities appreciated about the right wrist with radial deviation deformity.  Remains at neurovascular baseline distally and is tender to palpation about the right distal radius.     Imaging  XR wrist right 3+ views  Narrative: Interpreted By:  Rico Lloyd,   STUDY:  XR WRIST RIGHT 3+ VIEWS; 5/27/2025 3:13 pm      INDICATION:  Signs/Symptoms:pain.      COMPARISON:  None.      ACCESSION NUMBER(S):  [id number]      ORDERING CLINICIAN:  MINERVA MALLOY      FINDINGS:  Acute impacted distal radial metaphyseal fracture with  intra-articular extension and 11 mm foreshortening. Radiocarpal  alignment is maintained. Ulnar positive variance may be acquired      Acute nondisplaced ulnar styloid process base avulsion fracture.  Ossific fracture fragment adjacent to the radial aspect of the ulnar  styloid process measuring 3 mm in length with uncertain donor site      Mild STT degenerative change.      Osteopenia.      Impression: Acute impacted intra-articular distal radial metaphyseal fracture.      Acute nondisplaced ulnar styloid process base avulsion fracture.      Signed by: Rico Lloyd 5/28/2025 1:18 PM  Dictation workstation:   [id number]    PA and lateral imaging of the right wrist was obtained on the date of this exam to evaluate for maintenance of alignment of right distal radius and ulna fracture    Imaging demonstrates substantial shortening, loss of radial inclination with substantial ulnar positivity about the  right wrist.     Assessment  Unstable right distal radius and ulnar styloid fracture right wrist     Plan  Unfortunate situation as patient is not medically stable to move forward with surgery.  Patient is requiring substantial medical treatment to maintain oxygenation in an outpatient setting and as such any type of anesthetic would not be in patient's best interest.  Furthermore patient's nutrition is compromised and I feel her capacity to heal wounds to be substantially limited and pose an increased risk of complications including infection about the right wrist and hand with any type of operative intervention.  Patient does not wish to pursue any operative intervention and does not feel she is an appropriate candidate for surgery either.  Patient understands that she will likely have a deformity and may have limited use of the right wrist as a result of this injury.            [1]   Past Medical History:  Diagnosis Date    Acute upper respiratory infection, unspecified 09/05/2018    URI, acute    Arthritis     Cataract     Chronic obstructive pulmonary disease, unspecified 11/15/2022    COPD (chronic obstructive pulmonary disease)    Essential (primary) hypertension 11/15/2022    Hypertension    Gastro-esophageal reflux disease without esophagitis 11/15/2022    GERD (gastroesophageal reflux disease)    Osteoporosis     Shortness of breath     states being evaluated for oxygen use    Wears glasses    [2]   Allergies  Allergen Reactions    Codeine Nausea Only   [3]   Past Surgical History:  Procedure Laterality Date    APPENDECTOMY      INCISIONAL BREAST BIOPSY  10/03/2015    Incisional Breast Biopsy-right breast    TONSILLECTOMY      TUBAL LIGATION

## 2025-06-11 LAB
ANION GAP SERPL CALCULATED.4IONS-SCNC: 14 MMOL/L (CALC) (ref 7–17)
BUN SERPL-MCNC: 27 MG/DL (ref 7–25)
BUN/CREAT SERPL: 32 (CALC) (ref 6–22)
CALCIUM SERPL-MCNC: 9.2 MG/DL (ref 8.6–10.4)
CHLORIDE SERPL-SCNC: 98 MMOL/L (ref 98–110)
CHOLEST SERPL-MCNC: 257 MG/DL
CHOLEST/HDLC SERPL: 4.1 (CALC)
CO2 SERPL-SCNC: 27 MMOL/L (ref 20–32)
CREAT SERPL-MCNC: 0.84 MG/DL (ref 0.5–1.05)
EGFRCR SERPLBLD CKD-EPI 2021: 75 ML/MIN/1.73M2
EST. AVERAGE GLUCOSE BLD GHB EST-MCNC: 111 MG/DL
EST. AVERAGE GLUCOSE BLD GHB EST-SCNC: 6.2 MMOL/L
GLUCOSE SERPL-MCNC: 111 MG/DL (ref 65–99)
HBA1C MFR BLD: 5.5 %
HDLC SERPL-MCNC: 63 MG/DL
LDLC SERPL CALC-MCNC: 160 MG/DL (CALC)
NONHDLC SERPL-MCNC: 194 MG/DL (CALC)
POTASSIUM SERPL-SCNC: 3.7 MMOL/L (ref 3.5–5.3)
SODIUM SERPL-SCNC: 139 MMOL/L (ref 135–146)
TRIGL SERPL-MCNC: 187 MG/DL

## 2025-07-01 ENCOUNTER — APPOINTMENT (OUTPATIENT)
Dept: ORTHOPEDIC SURGERY | Facility: CLINIC | Age: 70
End: 2025-07-01
Payer: MEDICARE

## 2025-07-01 ENCOUNTER — HOSPITAL ENCOUNTER (OUTPATIENT)
Dept: RADIOLOGY | Facility: HOSPITAL | Age: 70
Discharge: HOME | End: 2025-07-01
Payer: MEDICARE

## 2025-07-01 DIAGNOSIS — S62.101D RIGHT WRIST FRACTURE, WITH ROUTINE HEALING, SUBSEQUENT ENCOUNTER: ICD-10-CM

## 2025-07-01 DIAGNOSIS — S52.531A COLLES' FRACTURE OF RIGHT RADIUS, INIT FOR CLOS FX: ICD-10-CM

## 2025-07-01 PROCEDURE — 73110 X-RAY EXAM OF WRIST: CPT | Mod: RT

## 2025-07-01 PROCEDURE — 99024 POSTOP FOLLOW-UP VISIT: CPT | Performed by: ORTHOPAEDIC SURGERY

## 2025-07-01 PROCEDURE — 73110 X-RAY EXAM OF WRIST: CPT | Mod: RIGHT SIDE | Performed by: STUDENT IN AN ORGANIZED HEALTH CARE EDUCATION/TRAINING PROGRAM

## 2025-07-01 NOTE — PROGRESS NOTES
History of Present Illness  Chief Complaint   Patient presents with    Right Wrist - Follow-up       Patient doing okay overall.  Has been maintaining her cast appropriately.    Medical History[1]    Medication Documentation Review Audit       Reviewed by Rico Kellogg RN (Registered Nurse) on 06/09/25 at 1114      Medication Order Taking? Sig Documenting Provider Last Dose Status   albuterol 2.5 mg /3 mL (0.083 %) nebulizer solution [id number] No Take 3 mL (2.5 mg) by nebulization 4 times a day as needed for wheezing or shortness of breath. Vivian Marie PA-C Past Week Active   albuterol 90 mcg/actuation inhaler [id number] No Inhale 2 puffs every 4 hours if needed for wheezing or shortness of breath.   Patient not taking: Reported on 5/27/2025    Alber Damian DO Past Week Active   cholecalciferol (Vitamin D-3) 25 MCG (1000 UT) capsule [id number] No Take 1 capsule (25 mcg) by mouth once daily.   Patient not taking: Reported on 2/3/2025    Historical Provider, MD Unknown Active   magnesium oxide (Mag-Ox) 400 mg (241.3 mg magnesium) tablet [id number] No Take 1 tablet (400 mg) by mouth once daily.   Patient not taking: Reported on 2/3/2025    Alber Damian DO Past Month Active   nebulizer accessories (Reusable Nebulizer Kit) kit [id number]  Use nebulizer every 6 hours as needed.   Patient not taking: Reported on 2/3/2025    Vivian Marie PA-C  Active   nebulizer and compressor device [id number]  1 Device every 6 hours if needed (sob or wheezint).   Patient not taking: Reported on 2/3/2025    Vivian Marie PA-C  Active   omeprazole (PriLOSEC) 20 mg DR capsule [id number] No Take 1 capsule (20 mg) by mouth once daily. Alber Damian DO Past Week Active   predniSONE (Deltasone) 20 mg tablet [id number]  Take 3 tablets for 2 days, then 2 tablets for 2 days, then 1 tablet for 2 days   Patient not taking: Reported on 2/3/2025    Alber Damian DO  Active   sodium,potassium,mag sulfates (Suprep) 17.5-3.13-1.6 gram  solution [id number]  Take one bottle twice as directed by the prep instructions   Patient not taking: Reported on 2/3/2025    Kathy Mcgill MD  Active   Patient not taking:  Discontinued 05/27/25 1349                    RX Allergies[2]    Social History     Socioeconomic History    Marital status:      Spouse name: Not on file    Number of children: Not on file    Years of education: Not on file    Highest education level: Not on file   Occupational History    Not on file   Tobacco Use    Smoking status: Former     Current packs/day: 1.00     Average packs/day: 1 pack/day for 38.5 years (38.5 ttl pk-yrs)     Types: Cigarettes     Start date: 1987    Smokeless tobacco: Never   Vaping Use    Vaping status: Never Used   Substance and Sexual Activity    Alcohol use: Not Currently     Comment: not recently    Drug use: Never    Sexual activity: Defer   Other Topics Concern    Not on file   Social History Narrative    Not on file     Social Drivers of Health     Financial Resource Strain: Low Risk  (1/19/2025)    Overall Financial Resource Strain (CARDIA)     Difficulty of Paying Living Expenses: Not very hard   Food Insecurity: No Food Insecurity (1/19/2025)    Hunger Vital Sign     Worried About Running Out of Food in the Last Year: Never true     Ran Out of Food in the Last Year: Never true   Transportation Needs: No Transportation Needs (1/19/2025)    PRAPARE - Transportation     Lack of Transportation (Medical): No     Lack of Transportation (Non-Medical): No   Physical Activity: Not on file   Stress: Not on file   Social Connections: Not on file   Intimate Partner Violence: Not At Risk (1/19/2025)    Humiliation, Afraid, Rape, and Kick questionnaire     Fear of Current or Ex-Partner: No     Emotionally Abused: No     Physically Abused: No     Sexually Abused: No   Housing Stability: Low Risk  (1/19/2025)    Housing Stability Vital Sign     Unable to Pay for Housing in the Last Year: No     Number of Times  Moved in the Last Year: 0     Homeless in the Last Year: No       Surgical History[3]         Review of Systems   GENERAL: Negative for malaise, significant weight loss, fever  MUSCULOSKELETAL: see HPI  NEURO:  Negative      Exam  Right wrist: Patient's cast remains well-positioned and snug.  Fingers are exposed nicely and no substantial loosening is appreciated around the wrist.     Imaging  PA and lateral imaging of the right wrist was independently reviewed and obtained in office today    Findings demonstrate maintenance of prior alignment status post impacted right distal radius and ulna fracture.  Substantial loss of height and loss of radial clinician is appreciated.       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  Will continue cast immobilization for additional month and see patient back in 1 month's time.    In office please obtain 2 view right wrist out of cast            [1]   Past Medical History:  Diagnosis Date    Acute upper respiratory infection, unspecified 09/05/2018    URI, acute    Arthritis     Cataract     Chronic obstructive pulmonary disease, unspecified 11/15/2022    COPD (chronic obstructive pulmonary disease)    Essential (primary) hypertension 11/15/2022    Hypertension    Gastro-esophageal reflux disease without esophagitis 11/15/2022    GERD (gastroesophageal reflux disease)    Osteoporosis     Shortness of breath     states being evaluated for oxygen use    Wears glasses    [2]   Allergies  Allergen Reactions    Codeine Nausea Only   [3]   Past Surgical History:  Procedure Laterality Date    APPENDECTOMY      INCISIONAL BREAST BIOPSY  10/03/2015    Incisional Breast Biopsy-right breast    TONSILLECTOMY      TUBAL LIGATION

## 2025-07-21 ENCOUNTER — OFFICE VISIT (OUTPATIENT)
Dept: PULMONOLOGY | Facility: CLINIC | Age: 70
End: 2025-07-21
Payer: MEDICARE

## 2025-07-21 VITALS
TEMPERATURE: 97.5 F | RESPIRATION RATE: 16 BRPM | BODY MASS INDEX: 15.72 KG/M2 | WEIGHT: 78 LBS | HEIGHT: 59 IN | HEART RATE: 110 BPM | OXYGEN SATURATION: 96 % | DIASTOLIC BLOOD PRESSURE: 64 MMHG | SYSTOLIC BLOOD PRESSURE: 130 MMHG

## 2025-07-21 DIAGNOSIS — R64 PULMONARY CACHEXIA DUE TO CHRONIC OBSTRUCTIVE PULMONARY DISEASE (MULTI): ICD-10-CM

## 2025-07-21 DIAGNOSIS — J96.11 CHRONIC HYPOXEMIC RESPIRATORY FAILURE: ICD-10-CM

## 2025-07-21 DIAGNOSIS — J44.9 CHRONIC OBSTRUCTIVE PULMONARY DISEASE, UNSPECIFIED COPD TYPE (MULTI): Primary | ICD-10-CM

## 2025-07-21 DIAGNOSIS — Z71.89 ADVANCE CARE PLANNING: ICD-10-CM

## 2025-07-21 DIAGNOSIS — J44.9 PULMONARY CACHEXIA DUE TO CHRONIC OBSTRUCTIVE PULMONARY DISEASE (MULTI): ICD-10-CM

## 2025-07-21 PROCEDURE — 3078F DIAST BP <80 MM HG: CPT | Performed by: STUDENT IN AN ORGANIZED HEALTH CARE EDUCATION/TRAINING PROGRAM

## 2025-07-21 PROCEDURE — 3008F BODY MASS INDEX DOCD: CPT | Performed by: STUDENT IN AN ORGANIZED HEALTH CARE EDUCATION/TRAINING PROGRAM

## 2025-07-21 PROCEDURE — 99214 OFFICE O/P EST MOD 30 MIN: CPT | Performed by: STUDENT IN AN ORGANIZED HEALTH CARE EDUCATION/TRAINING PROGRAM

## 2025-07-21 PROCEDURE — 3075F SYST BP GE 130 - 139MM HG: CPT | Performed by: STUDENT IN AN ORGANIZED HEALTH CARE EDUCATION/TRAINING PROGRAM

## 2025-07-21 PROCEDURE — 1159F MED LIST DOCD IN RCRD: CPT | Performed by: STUDENT IN AN ORGANIZED HEALTH CARE EDUCATION/TRAINING PROGRAM

## 2025-07-21 PROCEDURE — 99212 OFFICE O/P EST SF 10 MIN: CPT | Performed by: STUDENT IN AN ORGANIZED HEALTH CARE EDUCATION/TRAINING PROGRAM

## 2025-07-21 PROCEDURE — G2211 COMPLEX E/M VISIT ADD ON: HCPCS | Performed by: STUDENT IN AN ORGANIZED HEALTH CARE EDUCATION/TRAINING PROGRAM

## 2025-07-21 RX ORDER — ALBUTEROL SULFATE 90 UG/1
1 INHALANT RESPIRATORY (INHALATION) ONCE
OUTPATIENT
Start: 2025-07-21

## 2025-07-21 RX ORDER — ALBUTEROL SULFATE 0.83 MG/ML
3 SOLUTION RESPIRATORY (INHALATION) ONCE
OUTPATIENT
Start: 2025-07-21 | End: 2025-07-21

## 2025-07-21 ASSESSMENT — ENCOUNTER SYMPTOMS
DIFFICULTY URINATING: 0
DIARRHEA: 0
TROUBLE SWALLOWING: 0
CONSTIPATION: 0
FEVER: 0
SHORTNESS OF BREATH: 1
SLEEP DISTURBANCE: 0
LIGHT-HEADEDNESS: 0
WHEEZING: 1
CHILLS: 0
NAUSEA: 0
WEAKNESS: 1
FATIGUE: 1
SPEECH DIFFICULTY: 0
HEADACHES: 0
JOINT SWELLING: 0
UNEXPECTED WEIGHT CHANGE: 0
VOMITING: 0
CONFUSION: 0
BLOOD IN STOOL: 0
COLOR CHANGE: 0
ABDOMINAL PAIN: 0
COUGH: 1
ARTHRALGIAS: 0
DIZZINESS: 0
ABDOMINAL DISTENTION: 0

## 2025-07-21 NOTE — PROGRESS NOTES
Department of Medicine  Division of Pulmonary, Critical Care, and Sleep Medicine  Follow-up  [facility]    69 y.o. female who presents today for a follow-up visit for COPD.    Their last office visit with me was in June 2025.    I have independently interviewed and examined the patient in the office and reviewed available records. The finalized note is available in the electronic medical record for review by initial referring physician or provider.    Interval History (7/21/2025):  At our last visit we started the patient back on Trelegy as well as provided the patient with DuoNeb to use with her nebulizer.  This intervention has had a positive effect on the patient's overall breathing.  She states that she has better pulmonary endurance than she did previously and is coughing much less frequently.  She is actually put on weight since her last visit 72 lbs >>> 78 lbs.  Overall she is very pleased with the progress that she has made.  She did fall and fractured her wrist and is currently having her right arm in a cast.  She is scheduled to get the cast off next week    Immunization History:  Immunization History   Administered Date(s) Administered    Flu vaccine (IIV4), preservative free *Check age/dose* 09/25/2017, 10/18/2018    Flu vaccine, quadrivalent, high-dose, preservative free, age 65y+ (FLUZONE) 10/03/2020, 12/02/2022, 11/01/2023    Flu vaccine, trivalent, preservative free, HIGH-DOSE, age 65y+ (Fluzone) 10/16/2024    Flu vaccine, trivalent, preservative free, age 6 months and greater (Fluarix/Fluzone/Flulaval) 09/26/2016    Influenza, seasonal, injectable 10/10/2015    Moderna COVID-19 vaccine, 12 years and older (50mcg/0.5mL)(Spikevax) 11/01/2023    Moderna SARS-CoV-2 Vaccination 04/17/2021, 05/15/2021    Pfizer COVID-19 vaccine, 12 years and older, (30mcg/0.3mL) (Comirnaty) 10/16/2024    Pfizer Gray Cap SARS-CoV-2 05/12/2022    Pneumococcal conjugate vaccine, 20-valent (PREVNAR 20)  08/16/2022    RSV, 60 Years And Older (AREXVY) 11/01/2023    Zoster vaccine, recombinant, adult (SHINGRIX) 11/01/2023, 04/01/2024    Zoster, live 09/26/2016       Family History:  Family History[1]    Social History:  Social History[2]    Current Medications:  Current Outpatient Medications   Medication Instructions    albuterol 90 mcg/actuation inhaler 2 puffs, inhalation, Every 4 hours PRN    cholecalciferol (VITAMIN D-3) 25 mcg, Daily    fluticasone-umeclidin-vilanter (Trelegy Ellipta) 100-62.5-25 mcg blister with device 1 puff, inhalation, Daily, Rinse mouth with water after use to reduce aftertaste and incidence of candidiasis. Do not swallow.    ipratropium-albuteroL (Duo-Neb) 0.5-2.5 mg/3 mL nebulizer solution 3 mL, nebulization, Every 6 hours PRN    magnesium oxide (MAG-OX) 400 mg, oral, Daily    nebulizer accessories (Reusable Nebulizer Kit) kit Use nebulizer every 6 hours as needed.    nebulizer and compressor device 1 Device, miscellaneous, Every 6 hours PRN    omeprazole (PRILOSEC) 20 mg, oral, Daily    predniSONE (Deltasone) 20 mg tablet Take 3 tablets for 2 days, then 2 tablets for 2 days, then 1 tablet for 2 days    sodium,potassium,mag sulfates (Suprep) 17.5-3.13-1.6 gram solution Take one bottle twice as directed by the prep instructions        Drug Allergies/Intolerances:  RX Allergies[3]     Review of Systems:  Review of Systems   Constitutional:  Positive for fatigue. Negative for chills, fever and unexpected weight change.   HENT:  Negative for congestion and trouble swallowing.    Respiratory:  Positive for cough, shortness of breath and wheezing.    Cardiovascular:  Negative for chest pain.   Gastrointestinal:  Negative for abdominal distention, abdominal pain, blood in stool, constipation, diarrhea, nausea and vomiting.   Genitourinary:  Negative for difficulty urinating.   Musculoskeletal:  Negative for arthralgias and joint swelling.   Skin:  Negative for color change.   Neurological:   "Positive for weakness. Negative for dizziness, speech difficulty, light-headedness and headaches.   Psychiatric/Behavioral:  Negative for confusion and sleep disturbance.         All other review of systems are negative and/or non-contributory.    Physical Examination:  /64   Pulse 110   Temp 36.4 °C (97.5 °F)   Resp 16   Ht (!) 1.499 m (4' 11\")   Wt (!) 35.4 kg (78 lb)   SpO2 96%   BMI 15.75 kg/m²      Measured SpO2 is with ambient air at rest    Physical Exam  Constitutional:       General: She is awake.      Appearance: Normal appearance. She is cachectic.      Interventions: Nasal cannula in place.      Comments: Patient currently residing in a wheelchair   HENT:      Head: Normocephalic and atraumatic.      Nose: Nose normal.      Mouth/Throat:      Mouth: Mucous membranes are moist.     Eyes:      Pupils: Pupils are equal, round, and reactive to light.     Neck:      Thyroid: No thyroid mass.      Trachea: Phonation normal.     Cardiovascular:      Rate and Rhythm: Normal rate and regular rhythm.   Pulmonary:      Effort: Pulmonary effort is normal. Tachypnea present. No respiratory distress.      Breath sounds: Normal air entry. No decreased breath sounds, wheezing, rhonchi or rales.      Comments: Currently on 4 L nasal cannula via POC  Abdominal:      General: Bowel sounds are normal. There is no distension.      Palpations: Abdomen is soft.      Tenderness: There is no abdominal tenderness.     Musculoskeletal:      Cervical back: Neck supple.      Right lower leg: No edema.      Left lower leg: No edema.     Skin:     General: Skin is warm.      Capillary Refill: Capillary refill takes less than 2 seconds.     Neurological:      General: No focal deficit present.      Mental Status: She is alert and oriented to person, place, and time. Mental status is at baseline.      GCS: GCS eye subscore is 4. GCS verbal subscore is 5. GCS motor subscore is 6.      Cranial Nerves: Cranial nerves 2-12 are " intact.      Motor: Motor function is intact.     Psychiatric:         Attention and Perception: Attention and perception normal.         Mood and Affect: Mood normal.         Speech: Speech normal.         Behavior: Behavior normal.         Pulmonary Function Test Results     Failed to redirect to the Timeline version of the REVFS SmartLink.      Chest Radiograph     XR chest 1 view 05/27/2025    Narrative  EXAMINATION:  ONE XRAY VIEW OF THE CHEST    5/27/2025 5:08 pm    COMPARISON:  09/23/2021    HISTORY:  ORDERING SYSTEM PROVIDED HISTORY: SOB  TECHNOLOGIST PROVIDED HISTORY:  Reason for exam:->SOB  What reading provider will be dictating this exam?->CRC  FINDINGS:  The lungs are without acute focal process.  There is no effusion or  pneumothorax. The cardiomediastinal silhouette is without acute process. The  osseous structures are without acute process.    Impression  No acute process.      Echocardiogram     No results found for this or any previous visit from the past 365 days.       Chest CT Scan     No results found for this or any previous visit from the past 365 days.       Relevant Laboratory Tests       Assessment and Plan / Recommendations:  Problem List Items Addressed This Visit          Pulmonary and Pneumonias    COPD (chronic obstructive pulmonary disease) (Multi) - Primary    Relevant Orders    Referral to Pulmonary Rehabilitation    Complete Pulmonary Function Test Pre/Post Bronchodilator (Spirometry Pre/Post/DLCO/Lung Volumes)  End-stage COPD is suspected  Given the response that the patient has had to Trelegy, I do feel more confident about making a referral to pulmonary rehab which she is agreeable to.  I explained to the patient that she would also require repeat pulmonary function testing as part of her pulmonary rehab, she is agreeable to this as well.  We did broach the subject of adding Daliresp to the patient's regimen.  Given her low weight and improvement with initiation of Trelegy, we  "will hold off on starting this medication which can promote weight loss due to the side effects.     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  She needs calorie/nutrient dense foods as well as routine supplementation      Chronic hypoxemic respiratory failure      This patient is requiring 4 L nasal cannula on a continuous basis.  She does report periods where she is able to tolerate being off of the oxygen.  I discouraged this and stated that she should not be trying to titrate herself off of oxygen without the assistance of medical providers.      Advance care planning      We did have a more extended conversation regarding her goals of care.  I explained that given her current condition that there is a high likelihood that she becomes hospitalized for her breathing within the next 1 year.  She states that previously they had filled out a living will in the [facility] but does not have that paperwork.  She does plan to reach out to a  to obtain a living well as well as advanced directives.  The patient did inform me that she would be willing to go on life support if needed but would not want to be kept alive long-term on mechanical ventilation.  Her exact words were \"I am an organ donor and they can keep me alive long enough so that I can donate my organs\"  I did broach the subject of palliative care at today's visit.  She declined a referral today but is open to referral in the future.             Follow-up: November 2025    Kurt Carey MD, MSBS   Pulmonary/Critical Care  1:58 PM  07/21/25  [address] [address]  Office: [phone]  Fax: [phone]  Cell: [phone]         [1]   Family History  Problem Relation Name Age of Onset    Breast cancer Sister      Breast cancer Father's Sister     [2]   Social " History  Socioeconomic History    Marital status:    Tobacco Use    Smoking status: Former     Current packs/day: 1.00     Average packs/day: 1 pack/day for 38.6 years (38.6 ttl pk-yrs)     Types: Cigarettes     Start date: 1987    Smokeless tobacco: Never   Vaping Use    Vaping status: Never Used   Substance and Sexual Activity    Alcohol use: Not Currently     Comment: not recently    Drug use: Never    Sexual activity: Not Currently     Partners: Male     Birth control/protection: Female Sterilization     Social Drivers of Health     Financial Resource Strain: Low Risk  (1/19/2025)    Overall Financial Resource Strain (CARDIA)     Difficulty of Paying Living Expenses: Not very hard   Food Insecurity: No Food Insecurity (1/19/2025)    Hunger Vital Sign     Worried About Running Out of Food in the Last Year: Never true     Ran Out of Food in the Last Year: Never true   Transportation Needs: No Transportation Needs (1/19/2025)    PRAPARE - Transportation     Lack of Transportation (Medical): No     Lack of Transportation (Non-Medical): No   Intimate Partner Violence: Not At Risk (1/19/2025)    Humiliation, Afraid, Rape, and Kick questionnaire     Fear of Current or Ex-Partner: No     Emotionally Abused: No     Physically Abused: No     Sexually Abused: No   Housing Stability: Low Risk  (1/19/2025)    Housing Stability Vital Sign     Unable to Pay for Housing in the Last Year: No     Number of Times Moved in the Last Year: 0     Homeless in the Last Year: No   [3]   Allergies  Allergen Reactions    Codeine Nausea Only

## 2025-07-29 ENCOUNTER — APPOINTMENT (OUTPATIENT)
Dept: ORTHOPEDIC SURGERY | Facility: CLINIC | Age: 70
End: 2025-07-29
Payer: MEDICARE

## 2025-07-29 ENCOUNTER — HOSPITAL ENCOUNTER (OUTPATIENT)
Dept: RADIOLOGY | Facility: HOSPITAL | Age: 70
Discharge: HOME | End: 2025-07-29
Payer: MEDICARE

## 2025-07-29 DIAGNOSIS — S62.101D RIGHT WRIST FRACTURE, WITH ROUTINE HEALING, SUBSEQUENT ENCOUNTER: ICD-10-CM

## 2025-07-29 PROCEDURE — 99024 POSTOP FOLLOW-UP VISIT: CPT | Performed by: ORTHOPAEDIC SURGERY

## 2025-07-29 PROCEDURE — 1159F MED LIST DOCD IN RCRD: CPT | Performed by: ORTHOPAEDIC SURGERY

## 2025-07-29 PROCEDURE — 73110 X-RAY EXAM OF WRIST: CPT | Mod: RT

## 2025-07-29 PROCEDURE — 73110 X-RAY EXAM OF WRIST: CPT | Mod: RIGHT SIDE | Performed by: RADIOLOGY

## 2025-07-29 PROCEDURE — L3908 WHO COCK-UP NONMOLDE PRE OTS: HCPCS | Performed by: ORTHOPAEDIC SURGERY

## 2025-07-29 NOTE — PROGRESS NOTES
History of Present Illness  Chief Complaint   Patient presents with    Right Wrist - Follow-up     XOP xrays today       Patient doing better overall.  Reports she is using her wrist for light activities.    Medical History[1]    Medication Documentation Review Audit       Reviewed by Raine Bird MA (Medical Assistant) on 25 at 0957      Medication Order Taking? Sig Documenting Provider Last Dose Status   albuterol 90 mcg/actuation inhaler [id number]  Inhale 2 puffs every 4 hours if needed for wheezing or shortness of breath. Kurt Carey MD   25 2359   cholecalciferol (Vitamin D-3) 25 MCG (1000 UT) capsule [id number] No Take 1 capsule (25 mcg) by mouth once daily. Historical Provider, MD Unknown Active   fluticasone-umeclidin-vilanter (Trelegy Ellipta) 100-62.5-25 mcg blister with device [id number]  Inhale 1 puff once daily. Rinse mouth with water after use to reduce aftertaste and incidence of candidiasis. Do not swallow. Kurt Carey MD  Active   ipratropium-albuteroL (Duo-Neb) 0.5-2.5 mg/3 mL nebulizer solution [id number]  Take 3 mL by nebulization every 6 hours if needed for shortness of breath. Kurt Carey MD  Active   magnesium oxide (Mag-Ox) 400 mg (241.3 mg magnesium) tablet [id number] No Take 1 tablet (400 mg) by mouth once daily. Alber Damian DO Past Month Active   nebulizer accessories (Reusable Nebulizer Kit) kit [id number]  Use nebulizer every 6 hours as needed. Vivian Marie PA-C  Active   nebulizer and compressor device [id number]  1 Device every 6 hours if needed (sob or wheezint). Vivian Marie PA-C  Active   omeprazole (PriLOSEC) 20 mg DR capsule [id number] No Take 1 capsule (20 mg) by mouth once daily.   Patient not taking: Reported on 2025    Alber Damian DO Past Week Active   predniSONE (Deltasone) 20 mg tablet [id number]  Take 3 tablets for 2 days, then 2 tablets for 2 days, then 1 tablet for 2 days Alber Damian DO  Active    sodium,potassium,mag sulfates (Suprep) 17.5-3.13-1.6 gram solution [id number]  Take one bottle twice as directed by the prep instructions Kathy Mcgill MD  Active   Patient not taking:  Discontinued 05/27/25 1349                    RX Allergies[2]    Social History     Socioeconomic History    Marital status:      Spouse name: Not on file    Number of children: Not on file    Years of education: Not on file    Highest education level: Not on file   Occupational History    Not on file   Tobacco Use    Smoking status: Former     Current packs/day: 1.00     Average packs/day: 1 pack/day for 38.6 years (38.6 ttl pk-yrs)     Types: Cigarettes     Start date: 1987    Smokeless tobacco: Never   Vaping Use    Vaping status: Never Used   Substance and Sexual Activity    Alcohol use: Not Currently     Comment: not recently    Drug use: Never    Sexual activity: Not Currently     Partners: Male     Birth control/protection: Female Sterilization   Other Topics Concern    Not on file   Social History Narrative    Not on file     Social Drivers of Health     Financial Resource Strain: Low Risk  (1/19/2025)    Overall Financial Resource Strain (CARDIA)     Difficulty of Paying Living Expenses: Not very hard   Food Insecurity: No Food Insecurity (1/19/2025)    Hunger Vital Sign     Worried About Running Out of Food in the Last Year: Never true     Ran Out of Food in the Last Year: Never true   Transportation Needs: No Transportation Needs (1/19/2025)    PRAPARE - Transportation     Lack of Transportation (Medical): No     Lack of Transportation (Non-Medical): No   Physical Activity: Not on file   Stress: Not on file   Social Connections: Not on file   Intimate Partner Violence: Not At Risk (1/19/2025)    Humiliation, Afraid, Rape, and Kick questionnaire     Fear of Current or Ex-Partner: No     Emotionally Abused: No     Physically Abused: No     Sexually Abused: No   Housing Stability: Low Risk  (1/19/2025)    Housing  Stability Vital Sign     Unable to Pay for Housing in the Last Year: No     Number of Times Moved in the Last Year: 0     Homeless in the Last Year: No       Surgical History[3]         Review of Systems   GENERAL: Negative for malaise, significant weight loss, fever  MUSCULOSKELETAL: see HPI  NEURO:  Negative    Exam  Right wrist: Patient has deformity appreciated about the right wrist with stiffness in flexion and extension however mobilizing the wrist appropriately with minimal tenderness palpation about the distal radius.     Imaging  PA, oblique and lateral imaging of the right wrist was obtained on the date of this exam to evaluate for progressive healing of distal radius fracture    Imaging demonstrates progressive radiographic healing of right distal radius fracture with substantial shortening and loss of radial clinician.       Assessment  Progressive healing right distal radius fracture     Plan  Patient has a malunited but healing right distal radius fracture and given patient's substantial medical comorbidities this is the best case scenario for patient.  Patient was transition to a removable wrist brace to be used with activities and recommend follow-up in 2 months for repeat clinical and radiographic assessment.    Upon return to office please obtain 2 view right wrist              [1]   Past Medical History:  Diagnosis Date    Acute upper respiratory infection, unspecified 09/05/2018    URI, acute    Arthritis     Cataract     Chronic obstructive pulmonary disease, unspecified 11/15/2022    COPD (chronic obstructive pulmonary disease)    Essential (primary) hypertension 11/15/2022    Hypertension    Gastro-esophageal reflux disease without esophagitis 11/15/2022    GERD (gastroesophageal reflux disease)    Osteoporosis     Shortness of breath     states being evaluated for oxygen use    Wears glasses    [2]   Allergies  Allergen Reactions    Codeine Nausea Only   [3]   Past Surgical  History:  Procedure Laterality Date    APPENDECTOMY      INCISIONAL BREAST BIOPSY  10/03/2015    Incisional Breast Biopsy-right breast    TONSILLECTOMY      TUBAL LIGATION

## 2025-08-25 ENCOUNTER — APPOINTMENT (OUTPATIENT)
Dept: PRIMARY CARE | Facility: CLINIC | Age: 70
End: 2025-08-25
Payer: MEDICARE

## 2025-08-27 DIAGNOSIS — M81.0 AGE-RELATED OSTEOPOROSIS WITHOUT CURRENT PATHOLOGICAL FRACTURE: Primary | ICD-10-CM

## 2025-08-27 RX ORDER — ZOLEDRONIC ACID 5 MG/100ML
5 INJECTION, SOLUTION INTRAVENOUS ONCE
OUTPATIENT
Start: 2025-08-27

## 2025-09-02 ENCOUNTER — APPOINTMENT (OUTPATIENT)
Dept: PRIMARY CARE | Facility: CLINIC | Age: 70
End: 2025-09-02
Payer: MEDICARE

## 2025-09-23 ENCOUNTER — APPOINTMENT (OUTPATIENT)
Dept: GASTROENTEROLOGY | Facility: CLINIC | Age: 70
End: 2025-09-23
Payer: MEDICARE

## 2025-09-30 ENCOUNTER — APPOINTMENT (OUTPATIENT)
Dept: ORTHOPEDIC SURGERY | Facility: CLINIC | Age: 70
End: 2025-09-30
Payer: MEDICARE

## 2025-12-08 ENCOUNTER — APPOINTMENT (OUTPATIENT)
Dept: PRIMARY CARE | Facility: CLINIC | Age: 70
End: 2025-12-08
Payer: MEDICARE